# Patient Record
Sex: FEMALE | Race: BLACK OR AFRICAN AMERICAN | NOT HISPANIC OR LATINO | Employment: FULL TIME | URBAN - METROPOLITAN AREA
[De-identification: names, ages, dates, MRNs, and addresses within clinical notes are randomized per-mention and may not be internally consistent; named-entity substitution may affect disease eponyms.]

---

## 2018-01-16 ENCOUNTER — CLINICAL SUPPORT (OUTPATIENT)
Dept: URGENT CARE | Facility: CLINIC | Age: 28
End: 2018-01-16

## 2018-01-16 DIAGNOSIS — Z23 ENCOUNTER FOR IMMUNIZATION: Primary | ICD-10-CM

## 2018-01-16 PROCEDURE — 86580 TB INTRADERMAL TEST: CPT | Mod: S$GLB,,,

## 2018-05-01 ENCOUNTER — TELEPHONE (OUTPATIENT)
Dept: ORTHOPEDICS | Facility: CLINIC | Age: 28
End: 2018-05-01

## 2018-05-01 NOTE — TELEPHONE ENCOUNTER
Voice mail left for the patient stating once surgery is approved, we will get her scheduled for another appointment.     ----- Message from Laura Luu sent at 5/1/2018  9:10 AM CDT -----  Contact: Patient  [phone], please call patient, patient wants to see if she needs to make an appointment to see doctor again, they are waiting on a clearance for surgery.

## 2018-05-02 ENCOUNTER — TELEPHONE (OUTPATIENT)
Dept: ORTHOPEDICS | Facility: CLINIC | Age: 28
End: 2018-05-02

## 2018-05-02 NOTE — TELEPHONE ENCOUNTER
Voice mail left for the patient.     ----- Message from Rula Lees sent at 5/2/2018 11:32 AM CDT -----  Contact: Patient  Patient wants to know if she can get RX to help her sleep/pain

## 2018-06-04 ENCOUNTER — OFFICE VISIT (OUTPATIENT)
Dept: ORTHOPEDICS | Facility: CLINIC | Age: 28
End: 2018-06-04
Payer: COMMERCIAL

## 2018-06-04 VITALS
DIASTOLIC BLOOD PRESSURE: 60 MMHG | SYSTOLIC BLOOD PRESSURE: 96 MMHG | HEIGHT: 65 IN | BODY MASS INDEX: 23.49 KG/M2 | WEIGHT: 141 LBS | HEART RATE: 83 BPM

## 2018-06-04 DIAGNOSIS — M50.121 CERVICAL DISC DISORDER AT C4-C5 LEVEL WITH RADICULOPATHY: Primary | ICD-10-CM

## 2018-06-04 PROCEDURE — 99213 OFFICE O/P EST LOW 20 MIN: CPT | Mod: ,,, | Performed by: ORTHOPAEDIC SURGERY

## 2018-06-04 RX ORDER — HYDROCODONE BITARTRATE AND ACETAMINOPHEN 7.5; 325 MG/1; MG/1
1 TABLET ORAL EVERY 6 HOURS PRN
Qty: 30 TABLET | Refills: 0 | Status: ON HOLD | OUTPATIENT
Start: 2018-06-04 | End: 2018-06-29 | Stop reason: HOSPADM

## 2018-06-04 NOTE — PROGRESS NOTES
Subjective:       Patient ID: Osman Rm is a 27 y.o. female.    Chief Complaint: Pain of the Neck (ATTY Neck pain is worse. Pain radiates down left shoulder to elbow. She is here to schedule surgery)      History of Present Illness  She returns for follow-up and has continued complaints of constant neck pain that radiates down the left arm with pain and numbness in the left upper extremity. She was sent for second medical opinion the results of that of evaluation unknown. The patient wants to proceed with surgery and she cannot live with her neck pain.    Current Medications  Current Outpatient Prescriptions   Medication Sig Dispense Refill    ALBUTEROL SULFATE (PROAIR HFA INHL) Inhale into the lungs.      HYDROcodone-acetaminophen (NORCO) 7.5-325 mg per tablet Take 1 tablet by mouth every 6 (six) hours as needed for Pain. 30 tablet 0     No current facility-administered medications for this visit.        Allergies  Review of patient's allergies indicates:   Allergen Reactions    Adhesive tape-silicones Hives       Past Medical History  Past Medical History:   Diagnosis Date    Asthma        Surgical History  Past Surgical History:   Procedure Laterality Date    SPINAL FUSION      TONSILLECTOMY         Family History:   History reviewed. No pertinent family history.    Social History:   Social History     Social History    Marital status: Single     Spouse name: N/A    Number of children: N/A    Years of education: N/A     Occupational History    Not on file.     Social History Main Topics    Smoking status: Never Smoker    Smokeless tobacco: Not on file    Alcohol use No    Drug use: No    Sexual activity: Yes     Partners: Male     Birth control/ protection: Abstinence     Other Topics Concern    Not on file     Social History Narrative    No narrative on file       Hospitalization/Major Diagnostic Procedure:     Review of Systems     General/Constitutional:  Chills denies. Fatigue denies.  Fever denies. Weight gain denies. Weight loss denies.    Respiratory:  Shortness of breath denies.    Cardiovascular:  Chest pain denies.    Gastrointestinal:  Constipation denies. Diarrhea denies. Nausea denies. Vomiting denies.     Hematology:  Easy bruising denies. Prolonged bleeding denies.     Genitourinary:  Frequent urination denies. Pain in lower back denies. Painful urination denies.     Musculoskeletal:  See HPI for details    Skin:  Rash denies.    Neurologic:  Dizziness denies. Gait abnormalities denies. Seizures denies. Tingling/Numbess denies.    Psychiatric:  Anxiety denies. Depressed mood denies.     Objective:   Vital Signs:   Vitals:    06/04/18 1117   BP: 96/60   Pulse: 83        Physical Exam      General Examination:     Constitutional: The patient is alert and oriented to lace person and time. Mood is pleasant.     Head/Face: Normal facial features normal eyebrows    Eyes: Normal extraocular motion bilaterally    Lungs: Respirations are equal and unlabored    Gait is coordinated.    Cardiovascular: There are no swelling or varicosities present.    Lymphatic: Negative for adenopathy    Skin: Normal    Neurological: Level of consciousness normal. Oriented to place person and time and situation    Psychiatric: Oriented to time place person and situation    Cervical spine examination shows tenderness and bilateral paraspinous muscles from the mid cervical area to the cervical thoracic junction left side greater than right. Has moderate tenderness and mild spasm on the left trapezius muscle. Cervical range of motion is moderately restricted in all directions. Spurling maneuver is positive on the left side causing pain radiating to left upper extremity.  Results of prior cervical MRI were discussed. The MRI shows evidence of diminished signal intensity at C4-5 C5-6 and C6-7. The C4-5 level has evidence of a disc bulge of the annulus and foraminal stenosis on the left side consistent with her  complaints there are there is no foraminal stenosis at C5-6 or C6-7  XRAY Report/ Interpretation:      Assessment:       1. Cervical disc disorder at C4-C5 level with radiculopathy        Plan:       Osman was seen today for pain.    Diagnoses and all orders for this visit:    Cervical disc disorder at C4-C5 level with radiculopathy    Other orders  -     HYDROcodone-acetaminophen (NORCO) 7.5-325 mg per tablet; Take 1 tablet by mouth every 6 (six) hours as needed for Pain.         Follow-up cervical surgery C4-5 disc replacement.    Treatment options were discussed. She feels the pains are intractable. I believe the pain generating level is the C4-5 level. Because of her age I would recommend a cervical discectomy decompression and cervical disc arthroplasty at C4-5.  The technical aspects of the surgery were discussed in detail with the patient today. The patient was able to verbalize an understanding. The procedure risk benefits alternatives and possible complications of the surgical procedure was discussed including expected outcomes. No guarantees were given regards to outcomes. Consent forms were will be signed at a later date. All questions regarding the surgery itself were answered. The patient wishes to proceed with surgery and will be scheduled. The patient may require preoperative medical clearance.    This note was created using Dragon voice recognition software that occasionally misinterpreted phrases or words.

## 2018-06-12 ENCOUNTER — CLINICAL SUPPORT (OUTPATIENT)
Dept: URGENT CARE | Facility: CLINIC | Age: 28
End: 2018-06-12

## 2018-06-12 ENCOUNTER — TELEPHONE (OUTPATIENT)
Dept: ORTHOPEDICS | Facility: CLINIC | Age: 28
End: 2018-06-12

## 2018-06-12 DIAGNOSIS — M50.121 CERVICAL DISC DISORDER AT C4-C5 LEVEL WITH RADICULOPATHY: Primary | ICD-10-CM

## 2018-06-12 DIAGNOSIS — Z23 ENCOUNTER FOR IMMUNIZATION: Primary | ICD-10-CM

## 2018-06-12 NOTE — TELEPHONE ENCOUNTER
Sx scheduled.     ----- Message from Laura Luu sent at 6/11/2018  9:51 AM CDT -----  Contact: patient  Call patient, did we get an approval for surgery and can we start her on pre op this week since she will be out of town next week? [phone].

## 2018-06-14 ENCOUNTER — HOSPITAL ENCOUNTER (OUTPATIENT)
Dept: RADIOLOGY | Facility: HOSPITAL | Age: 28
Discharge: HOME | End: 2018-06-14
Attending: ORTHOPAEDIC SURGERY
Payer: COMMERCIAL

## 2018-06-14 ENCOUNTER — CLINICAL SUPPORT (OUTPATIENT)
Dept: URGENT CARE | Facility: CLINIC | Age: 28
End: 2018-06-14

## 2018-06-14 ENCOUNTER — HOSPITAL ENCOUNTER (OUTPATIENT)
Dept: PREADMISSION TESTING | Facility: HOSPITAL | Age: 28
Discharge: HOME | End: 2018-06-14
Attending: ORTHOPAEDIC SURGERY
Payer: COMMERCIAL

## 2018-06-14 VITALS — BODY MASS INDEX: 24.41 KG/M2 | HEIGHT: 64 IN | WEIGHT: 143 LBS

## 2018-06-14 DIAGNOSIS — M50.121 CERVICAL DISC DISORDER AT C4-C5 LEVEL WITH RADICULOPATHY: ICD-10-CM

## 2018-06-14 DIAGNOSIS — M50.121 CERVICAL DISC DISORDER AT C4-C5 LEVEL WITH RADICULOPATHY: Primary | ICD-10-CM

## 2018-06-14 DIAGNOSIS — Z11.1 ENCOUNTER FOR PPD TEST: Primary | ICD-10-CM

## 2018-06-14 LAB
ABO + RH BLD: NORMAL
BILIRUB UR QL STRIP: NEGATIVE
BLD GP AB SCN CELLS X3 SERPL QL: NORMAL
CLARITY UR: CLEAR
COLOR UR: YELLOW
GLUCOSE UR QL STRIP: NEGATIVE
HGB UR QL STRIP: ABNORMAL
KETONES UR QL STRIP: NEGATIVE
LEUKOCYTE ESTERASE UR QL STRIP: NEGATIVE
NITRITE UR QL STRIP: NEGATIVE
PH UR STRIP: 5 [PH] (ref 5–8)
PROT UR QL STRIP: NEGATIVE
SP GR UR STRIP: >=1.03 (ref 1–1.03)
TB INDURATION - 48 HR READ: NORMAL MM
TB INDURATION - 72 HR READ: NORMAL MM
TB SKIN TEST - 48 HR READ: NEGATIVE
TB SKIN TEST - 72 HR READ: NORMAL
URN SPEC COLLECT METH UR: ABNORMAL
UROBILINOGEN UR STRIP-ACNC: NEGATIVE EU/DL

## 2018-06-14 PROCEDURE — 93005 ELECTROCARDIOGRAM TRACING: CPT

## 2018-06-14 PROCEDURE — 72040 X-RAY EXAM NECK SPINE 2-3 VW: CPT | Mod: TC,FY

## 2018-06-14 PROCEDURE — 99900104 DSU ONLY-NO CHARGE-EA ADD'L HR (STAT)

## 2018-06-14 PROCEDURE — 71046 X-RAY EXAM CHEST 2 VIEWS: CPT | Mod: 26,,, | Performed by: RADIOLOGY

## 2018-06-14 PROCEDURE — 72040 X-RAY EXAM NECK SPINE 2-3 VW: CPT | Mod: 26,,, | Performed by: RADIOLOGY

## 2018-06-14 PROCEDURE — 81003 URINALYSIS AUTO W/O SCOPE: CPT

## 2018-06-14 PROCEDURE — 93010 ELECTROCARDIOGRAM REPORT: CPT | Mod: ,,, | Performed by: INTERNAL MEDICINE

## 2018-06-14 PROCEDURE — 86850 RBC ANTIBODY SCREEN: CPT

## 2018-06-14 PROCEDURE — 99900103 DSU ONLY-NO CHARGE-INITIAL HR (STAT)

## 2018-06-14 PROCEDURE — 71046 X-RAY EXAM CHEST 2 VIEWS: CPT | Mod: TC,FY

## 2018-06-14 RX ORDER — CLINDAMYCIN HYDROCHLORIDE 300 MG/1
300 CAPSULE ORAL 3 TIMES DAILY
Status: ON HOLD | COMMUNITY
End: 2018-06-29 | Stop reason: HOSPADM

## 2018-06-15 PROCEDURE — 86580 TB INTRADERMAL TEST: CPT | Mod: S$GLB,,, | Performed by: PREVENTIVE MEDICINE

## 2018-06-28 ENCOUNTER — ANESTHESIA EVENT (OUTPATIENT)
Dept: SURGERY | Facility: HOSPITAL | Age: 28
DRG: 473 | End: 2018-06-28

## 2018-06-28 ENCOUNTER — ANESTHESIA (OUTPATIENT)
Dept: SURGERY | Facility: HOSPITAL | Age: 28
DRG: 473 | End: 2018-06-28
Payer: COMMERCIAL

## 2018-06-28 ENCOUNTER — HOSPITAL ENCOUNTER (INPATIENT)
Facility: HOSPITAL | Age: 28
LOS: 1 days | Discharge: HOME | DRG: 473 | End: 2018-06-29
Attending: ORTHOPAEDIC SURGERY | Admitting: INTERNAL MEDICINE
Payer: COMMERCIAL

## 2018-06-28 DIAGNOSIS — M50.121 CERVICAL DISC DISORDER AT C4-C5 LEVEL WITH RADICULOPATHY: Primary | ICD-10-CM

## 2018-06-28 DIAGNOSIS — M50.121 CERVICAL DISC DISORDER AT C4-C5 LEVEL WITH RADICULOPATHY: ICD-10-CM

## 2018-06-28 LAB
ANION GAP SERPL CALC-SCNC: 11 MMOL/L
B-HCG UR QL: NEGATIVE
BASOPHILS # BLD AUTO: 0 K/UL
BASOPHILS NFR BLD: 0.3 %
BUN SERPL-MCNC: 12 MG/DL
CALCIUM SERPL-MCNC: 9.3 MG/DL
CHLORIDE SERPL-SCNC: 105 MMOL/L
CO2 SERPL-SCNC: 21 MMOL/L
CREAT SERPL-MCNC: 0.8 MG/DL
CTP QC/QA: YES
DIFFERENTIAL METHOD: ABNORMAL
EOSINOPHIL # BLD AUTO: 0.2 K/UL
EOSINOPHIL NFR BLD: 3.8 %
ERYTHROCYTE [DISTWIDTH] IN BLOOD BY AUTOMATED COUNT: 13.4 %
EST. GFR  (AFRICAN AMERICAN): >60 ML/MIN/1.73 M^2
EST. GFR  (NON AFRICAN AMERICAN): >60 ML/MIN/1.73 M^2
GLUCOSE SERPL-MCNC: 107 MG/DL
HCT VFR BLD AUTO: 30.9 %
HGB BLD-MCNC: 10.1 G/DL
LYMPHOCYTES # BLD AUTO: 2.1 K/UL
LYMPHOCYTES NFR BLD: 40.3 %
MCH RBC QN AUTO: 29.9 PG
MCHC RBC AUTO-ENTMCNC: 32.6 G/DL
MCV RBC AUTO: 92 FL
MONOCYTES # BLD AUTO: 0.3 K/UL
MONOCYTES NFR BLD: 6.3 %
NEUTROPHILS # BLD AUTO: 2.6 K/UL
NEUTROPHILS NFR BLD: 49.3 %
PLATELET # BLD AUTO: 213 K/UL
PMV BLD AUTO: 8.8 FL
POTASSIUM SERPL-SCNC: 3.6 MMOL/L
RBC # BLD AUTO: 3.37 M/UL
SODIUM SERPL-SCNC: 137 MMOL/L
WBC # BLD AUTO: 5.2 K/UL

## 2018-06-28 PROCEDURE — 0RB30ZZ EXCISION OF CERVICAL VERTEBRAL DISC, OPEN APPROACH: ICD-10-PCS | Performed by: ORTHOPAEDIC SURGERY

## 2018-06-28 PROCEDURE — 27201423 OPTIME MED/SURG SUP & DEVICES STERILE SUPPLY: Performed by: ORTHOPAEDIC SURGERY

## 2018-06-28 PROCEDURE — 85025 COMPLETE CBC W/AUTO DIFF WBC: CPT

## 2018-06-28 PROCEDURE — 37000008 HC ANESTHESIA 1ST 15 MINUTES: Performed by: ORTHOPAEDIC SURGERY

## 2018-06-28 PROCEDURE — 27000221 HC OXYGEN, UP TO 24 HOURS

## 2018-06-28 PROCEDURE — 0RG10J0 FUSION OF CERVICAL VERTEBRAL JOINT WITH SYNTHETIC SUBSTITUTE, ANTERIOR APPROACH, ANTERIOR COLUMN, OPEN APPROACH: ICD-10-PCS | Performed by: ORTHOPAEDIC SURGERY

## 2018-06-28 PROCEDURE — 25000003 PHARM REV CODE 250: Performed by: NURSE ANESTHETIST, CERTIFIED REGISTERED

## 2018-06-28 PROCEDURE — 36000710: Performed by: ORTHOPAEDIC SURGERY

## 2018-06-28 PROCEDURE — 36415 COLL VENOUS BLD VENIPUNCTURE: CPT

## 2018-06-28 PROCEDURE — 27800903 OPTIME MED/SURG SUP & DEVICES OTHER IMPLANTS: Performed by: ORTHOPAEDIC SURGERY

## 2018-06-28 PROCEDURE — 94799 UNLISTED PULMONARY SVC/PX: CPT

## 2018-06-28 PROCEDURE — 36000711: Performed by: ORTHOPAEDIC SURGERY

## 2018-06-28 PROCEDURE — 88304 TISSUE EXAM BY PATHOLOGIST: CPT | Mod: 26,,, | Performed by: PATHOLOGY

## 2018-06-28 PROCEDURE — 71000033 HC RECOVERY, INTIAL HOUR: Performed by: ORTHOPAEDIC SURGERY

## 2018-06-28 PROCEDURE — 63600175 PHARM REV CODE 636 W HCPCS: Performed by: ORTHOPAEDIC SURGERY

## 2018-06-28 PROCEDURE — 25000003 PHARM REV CODE 250: Performed by: PHYSICIAN ASSISTANT

## 2018-06-28 PROCEDURE — 12000002 HC ACUTE/MED SURGE SEMI-PRIVATE ROOM

## 2018-06-28 PROCEDURE — 63600175 PHARM REV CODE 636 W HCPCS: Performed by: PHYSICIAN ASSISTANT

## 2018-06-28 PROCEDURE — 99900035 HC TECH TIME PER 15 MIN (STAT)

## 2018-06-28 PROCEDURE — 63600175 PHARM REV CODE 636 W HCPCS: Performed by: ANESTHESIOLOGY

## 2018-06-28 PROCEDURE — D9220A PRA ANESTHESIA: Mod: CRNA,,, | Performed by: NURSE ANESTHETIST, CERTIFIED REGISTERED

## 2018-06-28 PROCEDURE — 99900104 DSU ONLY-NO CHARGE-EA ADD'L HR (STAT): Performed by: ORTHOPAEDIC SURGERY

## 2018-06-28 PROCEDURE — 25000003 PHARM REV CODE 250: Performed by: ORTHOPAEDIC SURGERY

## 2018-06-28 PROCEDURE — 63600175 PHARM REV CODE 636 W HCPCS: Performed by: NURSE ANESTHETIST, CERTIFIED REGISTERED

## 2018-06-28 PROCEDURE — 80048 BASIC METABOLIC PNL TOTAL CA: CPT

## 2018-06-28 PROCEDURE — D9220A PRA ANESTHESIA: Mod: ANES,,, | Performed by: ANESTHESIOLOGY

## 2018-06-28 PROCEDURE — 25000003 PHARM REV CODE 250: Performed by: ANESTHESIOLOGY

## 2018-06-28 PROCEDURE — 88304 TISSUE EXAM BY PATHOLOGIST: CPT | Performed by: PATHOLOGY

## 2018-06-28 PROCEDURE — 94761 N-INVAS EAR/PLS OXIMETRY MLT: CPT

## 2018-06-28 PROCEDURE — 37000009 HC ANESTHESIA EA ADD 15 MINS: Performed by: ORTHOPAEDIC SURGERY

## 2018-06-28 PROCEDURE — 25000003 PHARM REV CODE 250: Performed by: INTERNAL MEDICINE

## 2018-06-28 PROCEDURE — 94770 HC EXHALED C02 TEST: CPT

## 2018-06-28 PROCEDURE — 71000039 HC RECOVERY, EACH ADD'L HOUR: Performed by: ORTHOPAEDIC SURGERY

## 2018-06-28 PROCEDURE — C1713 ANCHOR/SCREW BN/BN,TIS/BN: HCPCS | Performed by: ORTHOPAEDIC SURGERY

## 2018-06-28 PROCEDURE — 99222 1ST HOSP IP/OBS MODERATE 55: CPT | Mod: ,,, | Performed by: INTERNAL MEDICINE

## 2018-06-28 PROCEDURE — S0020 INJECTION, BUPIVICAINE HYDRO: HCPCS | Performed by: ORTHOPAEDIC SURGERY

## 2018-06-28 PROCEDURE — 99900103 DSU ONLY-NO CHARGE-INITIAL HR (STAT): Performed by: ORTHOPAEDIC SURGERY

## 2018-06-28 DEVICE — DISC CERVICAL MOBI-C 13X15X5MM: Type: IMPLANTABLE DEVICE | Site: NECK | Status: FUNCTIONAL

## 2018-06-28 RX ORDER — ONDANSETRON 4 MG/1
8 TABLET, ORALLY DISINTEGRATING ORAL EVERY 6 HOURS PRN
Status: DISCONTINUED | OUTPATIENT
Start: 2018-06-28 | End: 2018-06-29 | Stop reason: HOSPADM

## 2018-06-28 RX ORDER — NEOSTIGMINE METHYLSULFATE 1 MG/ML
INJECTION, SOLUTION INTRAVENOUS
Status: DISCONTINUED | OUTPATIENT
Start: 2018-06-28 | End: 2018-06-28

## 2018-06-28 RX ORDER — HYDROMORPHONE HCL IN 0.9% NACL 6 MG/30 ML
PATIENT CONTROLLED ANALGESIA SYRINGE INTRAVENOUS CONTINUOUS
Status: DISCONTINUED | OUTPATIENT
Start: 2018-06-28 | End: 2018-06-29

## 2018-06-28 RX ORDER — ONDANSETRON 4 MG/1
8 TABLET, ORALLY DISINTEGRATING ORAL EVERY 8 HOURS PRN
Status: DISCONTINUED | OUTPATIENT
Start: 2018-06-28 | End: 2018-06-28 | Stop reason: SDUPTHER

## 2018-06-28 RX ORDER — KETOROLAC TROMETHAMINE 30 MG/ML
INJECTION, SOLUTION INTRAMUSCULAR; INTRAVENOUS
Status: DISCONTINUED | OUTPATIENT
Start: 2018-06-28 | End: 2018-06-28

## 2018-06-28 RX ORDER — RAMELTEON 8 MG/1
8 TABLET ORAL NIGHTLY PRN
Status: DISCONTINUED | OUTPATIENT
Start: 2018-06-28 | End: 2018-06-29 | Stop reason: HOSPADM

## 2018-06-28 RX ORDER — LIDOCAINE HCL/PF 100 MG/5ML
SYRINGE (ML) INTRAVENOUS
Status: DISCONTINUED | OUTPATIENT
Start: 2018-06-28 | End: 2018-06-28

## 2018-06-28 RX ORDER — ACETAMINOPHEN 10 MG/ML
INJECTION, SOLUTION INTRAVENOUS
Status: DISCONTINUED | OUTPATIENT
Start: 2018-06-28 | End: 2018-06-28

## 2018-06-28 RX ORDER — SODIUM CHLORIDE, SODIUM LACTATE, POTASSIUM CHLORIDE, CALCIUM CHLORIDE 600; 310; 30; 20 MG/100ML; MG/100ML; MG/100ML; MG/100ML
INJECTION, SOLUTION INTRAVENOUS CONTINUOUS
Status: DISCONTINUED | OUTPATIENT
Start: 2018-06-28 | End: 2018-06-29 | Stop reason: HOSPADM

## 2018-06-28 RX ORDER — SODIUM CHLORIDE 0.9 % (FLUSH) 0.9 %
3 SYRINGE (ML) INJECTION
Status: DISCONTINUED | OUTPATIENT
Start: 2018-06-28 | End: 2018-06-29 | Stop reason: HOSPADM

## 2018-06-28 RX ORDER — GLYCOPYRROLATE 0.2 MG/ML
INJECTION INTRAMUSCULAR; INTRAVENOUS
Status: DISCONTINUED | OUTPATIENT
Start: 2018-06-28 | End: 2018-06-28

## 2018-06-28 RX ORDER — MAG HYDROX/ALUMINUM HYD/SIMETH 200-200-20
30 SUSPENSION, ORAL (FINAL DOSE FORM) ORAL EVERY 4 HOURS PRN
Status: DISCONTINUED | OUTPATIENT
Start: 2018-06-28 | End: 2018-06-29 | Stop reason: HOSPADM

## 2018-06-28 RX ORDER — MEPERIDINE HYDROCHLORIDE 25 MG/ML
12.5 INJECTION INTRAMUSCULAR; INTRAVENOUS; SUBCUTANEOUS ONCE AS NEEDED
Status: COMPLETED | OUTPATIENT
Start: 2018-06-28 | End: 2018-06-28

## 2018-06-28 RX ORDER — FENTANYL CITRATE 50 UG/ML
INJECTION, SOLUTION INTRAMUSCULAR; INTRAVENOUS
Status: DISCONTINUED | OUTPATIENT
Start: 2018-06-28 | End: 2018-06-28

## 2018-06-28 RX ORDER — HYDROMORPHONE HYDROCHLORIDE 2 MG/ML
2 INJECTION, SOLUTION INTRAMUSCULAR; INTRAVENOUS; SUBCUTANEOUS
Status: DISCONTINUED | OUTPATIENT
Start: 2018-06-28 | End: 2018-06-29 | Stop reason: HOSPADM

## 2018-06-28 RX ORDER — OXYCODONE HYDROCHLORIDE 5 MG/1
5 TABLET ORAL EVERY 4 HOURS PRN
Status: DISCONTINUED | OUTPATIENT
Start: 2018-06-28 | End: 2018-06-29 | Stop reason: HOSPADM

## 2018-06-28 RX ORDER — CEFAZOLIN SODIUM 2 G/50ML
2 SOLUTION INTRAVENOUS
Status: COMPLETED | OUTPATIENT
Start: 2018-06-28 | End: 2018-06-29

## 2018-06-28 RX ORDER — ALBUTEROL SULFATE 2.5 MG/.5ML
2.5 SOLUTION RESPIRATORY (INHALATION) EVERY 4 HOURS PRN
Status: DISCONTINUED | OUTPATIENT
Start: 2018-06-28 | End: 2018-06-29 | Stop reason: HOSPADM

## 2018-06-28 RX ORDER — NALOXONE HCL 0.4 MG/ML
0.02 VIAL (ML) INJECTION
Status: DISCONTINUED | OUTPATIENT
Start: 2018-06-28 | End: 2018-06-29 | Stop reason: HOSPADM

## 2018-06-28 RX ORDER — MIDAZOLAM HYDROCHLORIDE 1 MG/ML
INJECTION, SOLUTION INTRAMUSCULAR; INTRAVENOUS
Status: DISCONTINUED | OUTPATIENT
Start: 2018-06-28 | End: 2018-06-28

## 2018-06-28 RX ORDER — MUPIROCIN 20 MG/G
1 OINTMENT TOPICAL 2 TIMES DAILY
Status: DISCONTINUED | OUTPATIENT
Start: 2018-06-28 | End: 2018-06-29 | Stop reason: HOSPADM

## 2018-06-28 RX ORDER — HYDROMORPHONE HYDROCHLORIDE 1 MG/ML
1 INJECTION, SOLUTION INTRAMUSCULAR; INTRAVENOUS; SUBCUTANEOUS
Status: DISCONTINUED | OUTPATIENT
Start: 2018-06-28 | End: 2018-06-29 | Stop reason: HOSPADM

## 2018-06-28 RX ORDER — CEFAZOLIN SODIUM 2 G/50ML
2 SOLUTION INTRAVENOUS
Status: DISCONTINUED | OUTPATIENT
Start: 2018-06-28 | End: 2018-06-28 | Stop reason: HOSPADM

## 2018-06-28 RX ORDER — BUPIVACAINE HYDROCHLORIDE 5 MG/ML
INJECTION, SOLUTION EPIDURAL; INTRACAUDAL
Status: DISCONTINUED | OUTPATIENT
Start: 2018-06-28 | End: 2018-06-28 | Stop reason: HOSPADM

## 2018-06-28 RX ORDER — DIPHENHYDRAMINE HCL 25 MG
25 CAPSULE ORAL EVERY 6 HOURS PRN
Status: DISCONTINUED | OUTPATIENT
Start: 2018-06-28 | End: 2018-06-29 | Stop reason: HOSPADM

## 2018-06-28 RX ORDER — METOCLOPRAMIDE HYDROCHLORIDE 5 MG/ML
10 INJECTION INTRAMUSCULAR; INTRAVENOUS EVERY 10 MIN PRN
Status: DISCONTINUED | OUTPATIENT
Start: 2018-06-28 | End: 2018-06-28 | Stop reason: HOSPADM

## 2018-06-28 RX ORDER — BACITRACIN 50000 [IU]/1
INJECTION, POWDER, FOR SOLUTION INTRAMUSCULAR
Status: DISCONTINUED | OUTPATIENT
Start: 2018-06-28 | End: 2018-06-28 | Stop reason: HOSPADM

## 2018-06-28 RX ORDER — KETAMINE HYDROCHLORIDE 100 MG/ML
INJECTION, SOLUTION INTRAMUSCULAR; INTRAVENOUS
Status: DISCONTINUED | OUTPATIENT
Start: 2018-06-28 | End: 2018-06-28

## 2018-06-28 RX ORDER — AMOXICILLIN 250 MG
2 CAPSULE ORAL NIGHTLY PRN
Status: DISCONTINUED | OUTPATIENT
Start: 2018-06-28 | End: 2018-06-29 | Stop reason: HOSPADM

## 2018-06-28 RX ORDER — ACETAMINOPHEN 325 MG/1
650 TABLET ORAL EVERY 6 HOURS PRN
Status: DISCONTINUED | OUTPATIENT
Start: 2018-06-29 | End: 2018-06-29 | Stop reason: HOSPADM

## 2018-06-28 RX ORDER — PROPOFOL 10 MG/ML
VIAL (ML) INTRAVENOUS CONTINUOUS PRN
Status: DISCONTINUED | OUTPATIENT
Start: 2018-06-28 | End: 2018-06-28

## 2018-06-28 RX ORDER — DOCUSATE SODIUM 100 MG/1
100 CAPSULE, LIQUID FILLED ORAL DAILY
Status: DISCONTINUED | OUTPATIENT
Start: 2018-06-28 | End: 2018-06-29 | Stop reason: HOSPADM

## 2018-06-28 RX ORDER — ROCURONIUM BROMIDE 10 MG/ML
INJECTION, SOLUTION INTRAVENOUS
Status: DISCONTINUED | OUTPATIENT
Start: 2018-06-28 | End: 2018-06-28

## 2018-06-28 RX ORDER — BISACODYL 10 MG
10 SUPPOSITORY, RECTAL RECTAL DAILY
Status: DISCONTINUED | OUTPATIENT
Start: 2018-06-28 | End: 2018-06-29 | Stop reason: HOSPADM

## 2018-06-28 RX ORDER — ACETAMINOPHEN 10 MG/ML
1000 INJECTION, SOLUTION INTRAVENOUS EVERY 8 HOURS
Status: COMPLETED | OUTPATIENT
Start: 2018-06-28 | End: 2018-06-29

## 2018-06-28 RX ORDER — LIDOCAINE HYDROCHLORIDE 10 MG/ML
INJECTION, SOLUTION EPIDURAL; INFILTRATION; INTRACAUDAL; PERINEURAL
Status: DISPENSED
Start: 2018-06-28 | End: 2018-06-28

## 2018-06-28 RX ORDER — KETOROLAC TROMETHAMINE 30 MG/ML
15 INJECTION, SOLUTION INTRAMUSCULAR; INTRAVENOUS ONCE
Status: DISCONTINUED | OUTPATIENT
Start: 2018-06-28 | End: 2018-06-29 | Stop reason: HOSPADM

## 2018-06-28 RX ORDER — PROPOFOL 10 MG/ML
VIAL (ML) INTRAVENOUS
Status: DISCONTINUED | OUTPATIENT
Start: 2018-06-28 | End: 2018-06-28

## 2018-06-28 RX ORDER — HYDROMORPHONE HYDROCHLORIDE 1 MG/ML
0.2 INJECTION, SOLUTION INTRAMUSCULAR; INTRAVENOUS; SUBCUTANEOUS EVERY 5 MIN PRN
Status: DISCONTINUED | OUTPATIENT
Start: 2018-06-28 | End: 2018-06-28 | Stop reason: HOSPADM

## 2018-06-28 RX ORDER — DEXAMETHASONE SODIUM PHOSPHATE 4 MG/ML
INJECTION, SOLUTION INTRA-ARTICULAR; INTRALESIONAL; INTRAMUSCULAR; INTRAVENOUS; SOFT TISSUE
Status: DISCONTINUED | OUTPATIENT
Start: 2018-06-28 | End: 2018-06-28

## 2018-06-28 RX ORDER — OXYCODONE HYDROCHLORIDE 10 MG/1
10 TABLET ORAL EVERY 4 HOURS PRN
Status: DISCONTINUED | OUTPATIENT
Start: 2018-06-28 | End: 2018-06-29 | Stop reason: HOSPADM

## 2018-06-28 RX ORDER — ALBUTEROL SULFATE 90 UG/1
2 AEROSOL, METERED RESPIRATORY (INHALATION) EVERY 4 HOURS PRN
Status: DISCONTINUED | OUTPATIENT
Start: 2018-06-28 | End: 2018-06-28 | Stop reason: SDUPTHER

## 2018-06-28 RX ORDER — PANTOPRAZOLE SODIUM 40 MG/1
40 TABLET, DELAYED RELEASE ORAL DAILY
Status: DISCONTINUED | OUTPATIENT
Start: 2018-06-28 | End: 2018-06-29 | Stop reason: HOSPADM

## 2018-06-28 RX ADMIN — MUPIROCIN 1 G: 20 OINTMENT TOPICAL at 01:06

## 2018-06-28 RX ADMIN — FENTANYL CITRATE 50 MCG: 50 INJECTION, SOLUTION INTRAMUSCULAR; INTRAVENOUS at 09:06

## 2018-06-28 RX ADMIN — CEFAZOLIN SODIUM 2 G: 2 SOLUTION INTRAVENOUS at 08:06

## 2018-06-28 RX ADMIN — HYDROMORPHONE HYDROCHLORIDE 0.2 MG: 1 INJECTION, SOLUTION INTRAMUSCULAR; INTRAVENOUS; SUBCUTANEOUS at 10:06

## 2018-06-28 RX ADMIN — DEXAMETHASONE SODIUM PHOSPHATE 4 MG: 4 INJECTION, SOLUTION INTRAMUSCULAR; INTRAVENOUS at 09:06

## 2018-06-28 RX ADMIN — SODIUM CHLORIDE, SODIUM LACTATE, POTASSIUM CHLORIDE, AND CALCIUM CHLORIDE: .6; .31; .03; .02 INJECTION, SOLUTION INTRAVENOUS at 09:06

## 2018-06-28 RX ADMIN — ACETAMINOPHEN 1000 MG: 10 INJECTION, SOLUTION INTRAVENOUS at 04:06

## 2018-06-28 RX ADMIN — KETOROLAC TROMETHAMINE 30 MG: 30 INJECTION, SOLUTION INTRAMUSCULAR; INTRAVENOUS at 09:06

## 2018-06-28 RX ADMIN — NEOSTIGMINE METHYLSULFATE 3 MG: 1 INJECTION INTRAVENOUS at 09:06

## 2018-06-28 RX ADMIN — Medication: at 11:06

## 2018-06-28 RX ADMIN — GLYCOPYRROLATE 0.4 MG: 0.2 INJECTION, SOLUTION INTRAMUSCULAR; INTRAVENOUS at 09:06

## 2018-06-28 RX ADMIN — MIDAZOLAM 2 MG: 1 INJECTION INTRAMUSCULAR; INTRAVENOUS at 08:06

## 2018-06-28 RX ADMIN — ROCURONIUM BROMIDE 50 MG: 10 INJECTION, SOLUTION INTRAVENOUS at 08:06

## 2018-06-28 RX ADMIN — Medication: at 07:06

## 2018-06-28 RX ADMIN — PROPOFOL 50 MCG/KG/MIN: 10 INJECTION, EMULSION INTRAVENOUS at 08:06

## 2018-06-28 RX ADMIN — DIPHENHYDRAMINE HYDROCHLORIDE 25 MG: 25 CAPSULE ORAL at 09:06

## 2018-06-28 RX ADMIN — KETAMINE HYDROCHLORIDE 50 MG: 100 INJECTION, SOLUTION, CONCENTRATE INTRAMUSCULAR; INTRAVENOUS at 08:06

## 2018-06-28 RX ADMIN — ACETAMINOPHEN 1000 MG: 10 INJECTION, SOLUTION INTRAVENOUS at 09:06

## 2018-06-28 RX ADMIN — FENTANYL CITRATE 100 MCG: 50 INJECTION, SOLUTION INTRAMUSCULAR; INTRAVENOUS at 08:06

## 2018-06-28 RX ADMIN — LIDOCAINE HYDROCHLORIDE 75 MG: 20 INJECTION, SOLUTION INTRAVENOUS at 08:06

## 2018-06-28 RX ADMIN — SODIUM CHLORIDE, SODIUM LACTATE, POTASSIUM CHLORIDE, AND CALCIUM CHLORIDE: .6; .31; .03; .02 INJECTION, SOLUTION INTRAVENOUS at 07:06

## 2018-06-28 RX ADMIN — MEPERIDINE HYDROCHLORIDE 12.5 MG: 25 INJECTION INTRAMUSCULAR; INTRAVENOUS; SUBCUTANEOUS at 11:06

## 2018-06-28 RX ADMIN — CEFAZOLIN SODIUM 2 G: 2 SOLUTION INTRAVENOUS at 11:06

## 2018-06-28 RX ADMIN — SODIUM CHLORIDE, SODIUM LACTATE, POTASSIUM CHLORIDE, AND CALCIUM CHLORIDE: .6; .31; .03; .02 INJECTION, SOLUTION INTRAVENOUS at 11:06

## 2018-06-28 RX ADMIN — DIPHENHYDRAMINE HYDROCHLORIDE 25 MG: 25 CAPSULE ORAL at 03:06

## 2018-06-28 RX ADMIN — PROPOFOL 140 MG: 10 INJECTION, EMULSION INTRAVENOUS at 08:06

## 2018-06-28 RX ADMIN — MUPIROCIN 1 G: 20 OINTMENT TOPICAL at 09:06

## 2018-06-28 RX ADMIN — CEFAZOLIN SODIUM 2 G: 2 SOLUTION INTRAVENOUS at 03:06

## 2018-06-28 NOTE — PLAN OF CARE
Patient is stable at this time.  VSS. Anesthesiologist at patient's bedside and is ok for patient to transfer to the floor.  Dressings clean, dry and intact.  Pain is a 6/10.  No complaints of nausea or vomiting.  Patient tolerating po intake well.

## 2018-06-28 NOTE — ANESTHESIA POSTPROCEDURE EVALUATION
"Anesthesia Post Evaluation    Patient: Osman Rm    Procedure(s) Performed: Procedure(s) (LRB):  DISCECTOMY, SPINE, CERVICAL, ANTERIOR APPROACH, ARTHROPLASTY C4-5 (N/A)  DECOMPRESSION (N/A)    Final Anesthesia Type: general  Patient location during evaluation: PACU  Patient participation: Yes- Able to Participate  Level of consciousness: awake and alert  Post-procedure vital signs: reviewed and stable  Pain management: adequate  Airway patency: patent  PONV status at discharge: No PONV  Anesthetic complications: no      Cardiovascular status: blood pressure returned to baseline  Respiratory status: unassisted  Hydration status: euvolemic  Follow-up not needed.        Visit Vitals  BP (!) 98/56 (Patient Position: Lying)   Pulse 64   Temp 36.2 °C (97.2 °F) (Axillary)   Resp 16   Ht 5' 4" (1.626 m)   Wt 64.9 kg (143 lb)   SpO2 100%   Breastfeeding? No   BMI 24.55 kg/m²       Pain/Brandon Score: Pain Assessment Performed: Yes (6/28/2018 12:00 PM)  Presence of Pain: complains of pain/discomfort (6/28/2018 12:00 PM)  Pain Rating Prior to Med Admin: 9 (6/28/2018 11:31 AM)  Brandon Score: 9 (6/28/2018 12:00 PM)      "

## 2018-06-28 NOTE — H&P
PCP: Annamarie Garcia NP    History & Physical    Chief Complaint: s/p C4-5 Cervical spine disectomy, arthroplasty and decompression    History of Present Illness:  Patient is a 27 y.o. female admitted to Hospitalist Service from Operation Room s/p C4-5 Cervical spine disectomy, arthroplasty and decompression performed by Dr. Brennan. Patient reportedly has past medical history significant for Neck pain and history of bronchial asthma. Post-operatively, patient denied chest pain, shortness of breath, abdominal pain, nausea, vomiting, headache, vision changes, focal neuro-deficits, cough or fever.    Past Medical History:   Diagnosis Date    Asthma      Past Surgical History:   Procedure Laterality Date    SPINAL FUSION      TONSILLECTOMY       History reviewed. No pertinent family history.  Social History   Substance Use Topics    Smoking status: Never Smoker    Smokeless tobacco: Never Used    Alcohol use No      Review of patient's allergies indicates:   Allergen Reactions    Adhesive tape-silicones Hives     PTA Medications   Medication Sig    HYDROcodone-acetaminophen (NORCO) 7.5-325 mg per tablet Take 1 tablet by mouth every 6 (six) hours as needed for Pain.    ALBUTEROL SULFATE (PROAIR HFA INHL) Inhale into the lungs.    clindamycin (CLEOCIN) 300 MG capsule Take 300 mg by mouth 3 (three) times daily.     Review of Systems:  Constitutional: no fever or chills  Eyes: no visual changes  Ears, nose, mouth, throat, and face: no nasal congestion or sore throat  Respiratory: no cough or shorness of breath  Cardiovascular: no chest pain or palpitations  Gastrointestinal: no nausea or vomiting, no abdominal pain or change in bowel habits  Genitourinary: no hematuria or dysuria  Integument/breast: no rash or pruritis  Hematologic/lymphatic: no easy bruising or lymphadenopathy  Musculoskeletal: see HPI  Neurological: no seizures or tremors.  Behavioral/Psych: no auditory or visual hallucinations  Endocrine: no  heat or cold intolerance     OBJECTIVE:     Vital Signs (Most Recent)  Temp: 97.3 °F (36.3 °C) (06/28/18 1010)  Pulse: 67 (06/28/18 0705)  Resp: 14 (06/28/18 0705)  BP: 107/64 (06/28/18 0705)  SpO2: 99 % (06/28/18 0705)    Physical Exam:  General appearance: well developed, appears stated age  Head: normocephalic, atraumatic  Eyes:  conjunctivae/corneas clear. PERRL.  Nose: Nares normal. Septum midline.  Throat: lips, mucosa, and tongue normal; teeth and gums normal, no throat erythema.  Neck: Neck dressing C/D/I, a soft neck collar in place, symmetrical, trachea midline, no JVD and thyroid not enlarged, symmetric, no tenderness/mass/nodules  Lungs:  clear to auscultation bilaterally and normal respiratory effort  Chest wall: no tenderness  Heart: regular rate and rhythm, S1, S2 normal, no murmur, click, rub or gallop  Abdomen: soft, non-tender non-distented; bowel sounds normal; no masses,  no organomegaly  Extremities: no cyanosis, clubbing or edema.   Pulses: 2+ and symmetric  Skin: Skin color, texture, turgor normal. No rashes or lesions.  Lymph nodes: Cervical, supraclavicular, and axillary nodes normal.  Neurologic: Normal strength and tone. No focal numbness or weakness. CNII-XII intact.    Laboratory:   CBC: No results for input(s): WBC, RBC, HGB, HCT, PLT, MCV, MCH, MCHC in the last 168 hours.  CMP: No results for input(s): GLU, CALCIUM, ALBUMIN, PROT, NA, K, CO2, CL, BUN, CREATININE, ALKPHOS, ALT, AST, BILITOT in the last 168 hours.    No results found for: HGBA1C    Diagnostic Results: None    Assessment/Plan:     Active Hospital Problems    Diagnosis  POA    *Cervical disc disorder at C4-C5 level with radiculopathy s/p C4-5 Cervical spine disectomy, arthroplasty and decompression [M50.121]  Continue to follow Orthopedic recommendations.  Needs aggressive incentive spirometry.  Follow hemoglobin and hematocrit closely.  Pain control with IV narcotics and antiemetics as needed.  Physical therapy as per  Orthopedics protocol with fall precautions.    Yes       DVT prophylaxis: Use SCD and TEDs. No anti-coagulation as per Dr. Brennan.      Adan Brownlee MD  Department of Hospital Medicine   [facility]

## 2018-06-28 NOTE — PLAN OF CARE
Problem: Patient Care Overview  Goal: Plan of Care Review  Outcome: Ongoing (interventions implemented as appropriate)  Pt POD 0 today. Left neck dressing and soft collar in place. Pt refuses to reposition or move in bed. Complains of left eye pain she believes may be related to adhesive during surgery. Suarez catheter in place. PCA pump used for pain. Benadryl prn for itching. Neurologically intact. Tolerating clear liquid diet. POC discussed with pt and family.

## 2018-06-28 NOTE — TRANSFER OF CARE
"Anesthesia Transfer of Care Note    Patient: Osman Rm    Procedure(s) Performed: Procedure(s) (LRB):  DISCECTOMY, SPINE, CERVICAL, ANTERIOR APPROACH, ARTHROPLASTY C4-5 (N/A)  DECOMPRESSION (N/A)    Patient location: PACU    Anesthesia Type: general    Transport from OR: Transported from OR on 2-3 L/min O2 by NC with adequate spontaneous ventilation    Post pain: adequate analgesia    Post assessment: no apparent anesthetic complications    Post vital signs: stable    Level of consciousness: awake    Nausea/Vomiting: no nausea/vomiting    Complications: none    Transfer of care protocol was followed      Last vitals:   Visit Vitals  /64 (BP Location: Left arm, Patient Position: Lying)   Pulse 67   Temp 36.5 °C (97.7 °F) (Skin)   Resp 14   Ht 5' 4" (1.626 m)   Wt 64.9 kg (143 lb)   SpO2 99%   Breastfeeding? No   BMI 24.55 kg/m²     "

## 2018-06-28 NOTE — ANESTHESIA PREPROCEDURE EVALUATION
06/28/2018  Osman Rm is a 27 y.o., female.    Anesthesia Evaluation    I have reviewed the Patient Summary Reports.    I have reviewed the Nursing Notes.   I have reviewed the Medications.     Review of Systems  Anesthesia Hx:  Denies Family Hx of Anesthesia complications.   Denies Personal Hx of Anesthesia complications.   Pulmonary:   Asthma mild    Musculoskeletal:   Radiculopathy left Spine Disorders: cervical        Physical Exam  General:  Well nourished    Airway/Jaw/Neck:  Airway Findings: Mouth Opening: Normal Tongue: Normal  General Airway Assessment: Adult  Mallampati: II  Improves to I with phonation.  TM Distance: Normal, at least 6 cm  Jaw/Neck Findings:  Neck ROM: Normal Extension, Painful  Neck Findings: Normal     Dental:  DENTAL FINDINGS: Normal   Chest/Lungs:  Chest/Lungs Clear    Heart/Vascular:  Heart Findings: Normal            Anesthesia Plan  Type of Anesthesia, risks & benefits discussed:  Anesthesia Type:  general  Patient's Preference:   Intra-op Monitoring Plan: standard ASA monitors  Intra-op Monitoring Plan Comments:   Post Op Pain Control Plan:   Post Op Pain Control Plan Comments:   Induction:   IV  Beta Blocker:  Patient is not currently on a Beta-Blocker (No further documentation required).       Informed Consent: Patient understands risks and agrees with Anesthesia plan.  Questions answered. Anesthesia consent signed with patient.  ASA Score: 1     Day of Surgery Review of History & Physical:    H&P update referred to the surgeon.         Ready For Surgery From Anesthesia Perspective.

## 2018-06-28 NOTE — BRIEF OP NOTE
[facility]  Brief Operative Note    SUMMARY     Surgery Date: 6/28/2018     Surgeon(s) and Role:     * Andrew Brennan MD - Primary    Assisting Surgeon: tomy Fuentes    Pre-op Diagnosis:  Cervical disc disorder at C4-C5 level with radiculopathy [M50.121]    Post-op Diagnosis:  Post-Op Diagnosis Codes:     * Cervical disc disorder at C4-C5 level with radiculopathy [M50.121]    Procedure(s) (LRB):  DISCECTOMY, SPINE, CERVICAL, ANTERIOR APPROACH, ARTHROPLASTY C4-5 (N/A)  DECOMPRESSION (N/A)    Anesthesia: General    Description of Procedure: dictated    Description of the findings of the procedure: dictated    Estimated Blood Loss: * No values recorded between 6/28/2018  8:44 AM and 6/28/2018  9:58 AM *         Specimens:   Specimen (12h ago through future)    Start     Ordered    06/28/18 0952  Specimen to Pathology - Surgery  Once     Comments:  Pre-op Diagnosis: Cervical disc disorder at C4-C5 level with radiculopathy [M50.121]Post-op Diagnosis: sameProcedure(s):DISCECTOMY, SPINE, CERVICAL, ANTERIOR APPROACH, ARTHROPLASTY C4-5DECOMPRESSION Number of specimens: 1Name of specimens: 1. c4-5 disc      06/28/18 0952

## 2018-06-28 NOTE — OR NURSING
Pt states she has weakness on left side of body. Hand , plantarflexion and dorsiflexion all noted to be weaker on left side.

## 2018-06-28 NOTE — OP NOTE
DATE OF PROCEDURE:  06/28/2018.    PREOPERATIVE DIAGNOSIS:  Cervical disk syndrome with radiculopathy, C4-C5.    FINAL DIAGNOSIS:  Cervical disk syndrome with radiculopathy, C4-C5.    PROCEDURES PERFORMED:  1.  Anterior cervical diskectomy and decompression C4-C5, CPT code 04029.  2.  Anterior cervical disk arthroplasty C4-C5 using Mobi-C 5 mm implant, CPT   code 39996.    SURGEON:  Andrew Brennan.    ASSISTANT:  JOSS Mcdonnell.    ANESTHESIA:  General.    OPERATIVE REPORT:  The nature of this procedure required the use of a skilled   surgical assistant, Ritesh Fuentes, physician's assistant served in that   capacity.  His role included patient positioning, surgical exposure,   implantation of disk arthroplasty and wound closure.  No qualified resident   physician was available.    PROCEDURE IN DETAILS:  The patient was brought to the Operating Room, placed on   the table in the supine position and intubated.  Catheter was placed in the   bladder.  Head halter traction was used to hold the neck in a neutral position.    Bony prominences were padded.  Leads were placed on the patient to perform   motor evoked potentials and somatosensory evoked potentials.  C4-C5 was   visualized using the image intensifier.  The anterior cervical area was cleansed   with alcohol and prepped with ChloraPrep solution.  A transverse incision was   made at C4-C5 level on the right side measuring about 2 cm.  We then dissected   through the platysma muscle and developed the interval between the trachea and   esophagus medially and carotid sheath laterally down to the prevertebral fascia   and the anterior spine were reidentified in the midline as well as C4-C5 level.    We then placed self-retaining retractors and [place] pins into the C4-C5   vertebral bodies and then incised the disk annulus, removed the disk in a   piecemeal fashion including cartilaginous endplates.  There was some bony   hypertrophy of the posterior uncinate  processes, which was removed with a bur to   make the superior endplate of C5 parallel.  A 5-mm trial gave a good fit.  The   Mobi-C 5 mm implant was inserted was brought onto the field.  We did bilateral   foraminotomies and left for the most part, the posterior longitudinal ligament   intact.  The Mobi-C 5 mm implant was then impacted into place under direct   visualization using image intensifier and once it was properly seated, the   insertion mechanism was removed.  The entire construct looked satisfactory.  We   removed the [place] pins then covered the holes with bone wax.  A drain was   brought through a separate stab incision.  The cervical incision was closed in   layers using Dermabond on the skin.  Throughout the case, there were no   abnormalities noted on neuromonitoring.  The patient was awakened, extubated and   brought to Recovery Room in stable condition.            /edmar [id number] arelis(s)        LUIS EDUARDO/ROMAN  dd: 06/28/2018 09:58:10 (CDT)  td: 06/28/2018 13:36:26 (CDT)  Doc ID   #[id number]  Job ID #[id number]    CC:

## 2018-06-29 VITALS
OXYGEN SATURATION: 98 % | DIASTOLIC BLOOD PRESSURE: 54 MMHG | SYSTOLIC BLOOD PRESSURE: 102 MMHG | RESPIRATION RATE: 12 BRPM | TEMPERATURE: 98 F | HEIGHT: 64 IN | HEART RATE: 65 BPM | BODY MASS INDEX: 24.46 KG/M2 | WEIGHT: 143.31 LBS

## 2018-06-29 LAB
ANION GAP SERPL CALC-SCNC: 7 MMOL/L
BASOPHILS # BLD AUTO: 0 K/UL
BASOPHILS NFR BLD: 0.4 %
BUN SERPL-MCNC: 10 MG/DL
CALCIUM SERPL-MCNC: 9.2 MG/DL
CHLORIDE SERPL-SCNC: 104 MMOL/L
CO2 SERPL-SCNC: 25 MMOL/L
CREAT SERPL-MCNC: 0.7 MG/DL
DIFFERENTIAL METHOD: ABNORMAL
EOSINOPHIL # BLD AUTO: 0.1 K/UL
EOSINOPHIL NFR BLD: 0.6 %
ERYTHROCYTE [DISTWIDTH] IN BLOOD BY AUTOMATED COUNT: 13.4 %
EST. GFR  (AFRICAN AMERICAN): >60 ML/MIN/1.73 M^2
EST. GFR  (NON AFRICAN AMERICAN): >60 ML/MIN/1.73 M^2
GLUCOSE SERPL-MCNC: 96 MG/DL
HCT VFR BLD AUTO: 29.1 %
HGB BLD-MCNC: 9.6 G/DL
LYMPHOCYTES # BLD AUTO: 2 K/UL
LYMPHOCYTES NFR BLD: 21.7 %
MCH RBC QN AUTO: 30.5 PG
MCHC RBC AUTO-ENTMCNC: 32.9 G/DL
MCV RBC AUTO: 93 FL
MONOCYTES # BLD AUTO: 0.6 K/UL
MONOCYTES NFR BLD: 6.5 %
NEUTROPHILS # BLD AUTO: 6.6 K/UL
NEUTROPHILS NFR BLD: 70.8 %
PLATELET # BLD AUTO: 197 K/UL
PMV BLD AUTO: 9 FL
POTASSIUM SERPL-SCNC: 3.9 MMOL/L
RBC # BLD AUTO: 3.15 M/UL
SODIUM SERPL-SCNC: 136 MMOL/L
WBC # BLD AUTO: 9.3 K/UL

## 2018-06-29 PROCEDURE — 27000221 HC OXYGEN, UP TO 24 HOURS

## 2018-06-29 PROCEDURE — 94799 UNLISTED PULMONARY SVC/PX: CPT

## 2018-06-29 PROCEDURE — 25000003 PHARM REV CODE 250: Performed by: INTERNAL MEDICINE

## 2018-06-29 PROCEDURE — 99238 HOSP IP/OBS DSCHRG MGMT 30/<: CPT | Mod: ,,, | Performed by: INTERNAL MEDICINE

## 2018-06-29 PROCEDURE — 94761 N-INVAS EAR/PLS OXIMETRY MLT: CPT

## 2018-06-29 PROCEDURE — 25000003 PHARM REV CODE 250: Performed by: PHYSICIAN ASSISTANT

## 2018-06-29 PROCEDURE — 85025 COMPLETE CBC W/AUTO DIFF WBC: CPT

## 2018-06-29 PROCEDURE — 36415 COLL VENOUS BLD VENIPUNCTURE: CPT

## 2018-06-29 PROCEDURE — 99900035 HC TECH TIME PER 15 MIN (STAT)

## 2018-06-29 PROCEDURE — 97162 PT EVAL MOD COMPLEX 30 MIN: CPT

## 2018-06-29 PROCEDURE — 63600175 PHARM REV CODE 636 W HCPCS: Performed by: ANESTHESIOLOGY

## 2018-06-29 PROCEDURE — 25000003 PHARM REV CODE 250: Performed by: NURSE PRACTITIONER

## 2018-06-29 PROCEDURE — 94770 HC EXHALED C02 TEST: CPT

## 2018-06-29 PROCEDURE — 63600175 PHARM REV CODE 636 W HCPCS: Performed by: PHYSICIAN ASSISTANT

## 2018-06-29 PROCEDURE — 80048 BASIC METABOLIC PNL TOTAL CA: CPT

## 2018-06-29 RX ORDER — HYDROXYZINE PAMOATE 25 MG/1
25 CAPSULE ORAL ONCE
Status: COMPLETED | OUTPATIENT
Start: 2018-06-29 | End: 2018-06-29

## 2018-06-29 RX ORDER — OXYCODONE AND ACETAMINOPHEN 10; 325 MG/1; MG/1
1 TABLET ORAL EVERY 6 HOURS PRN
Refills: 0
Start: 2018-06-29 | End: 2018-08-01

## 2018-06-29 RX ADMIN — HYDROXYZINE PAMOATE 25 MG: 25 CAPSULE ORAL at 05:06

## 2018-06-29 RX ADMIN — Medication: at 12:06

## 2018-06-29 RX ADMIN — ONDANSETRON 8 MG: 4 TABLET, ORALLY DISINTEGRATING ORAL at 05:06

## 2018-06-29 RX ADMIN — MUPIROCIN 1 G: 20 OINTMENT TOPICAL at 09:06

## 2018-06-29 RX ADMIN — DIPHENHYDRAMINE HYDROCHLORIDE 25 MG: 25 CAPSULE ORAL at 08:06

## 2018-06-29 RX ADMIN — OXYCODONE HYDROCHLORIDE 10 MG: 10 TABLET ORAL at 11:06

## 2018-06-29 RX ADMIN — DOCUSATE SODIUM 100 MG: 100 CAPSULE, LIQUID FILLED ORAL at 09:06

## 2018-06-29 RX ADMIN — CEFAZOLIN SODIUM 2 G: 2 SOLUTION INTRAVENOUS at 08:06

## 2018-06-29 RX ADMIN — PANTOPRAZOLE SODIUM 40 MG: 40 TABLET, DELAYED RELEASE ORAL at 09:06

## 2018-06-29 RX ADMIN — ACETAMINOPHEN 1000 MG: 10 INJECTION, SOLUTION INTRAVENOUS at 05:06

## 2018-06-29 NOTE — PLAN OF CARE
Problem: Patient Care Overview  Goal: Plan of Care Review  Outcome: Ongoing (interventions implemented as appropriate)  Vitals stable. POC/Meds reviewed, pt verbalized understanding. Mother at bedside. Soft collar in place. KAYLEE drain intact with minimal output. PCA in use. IVF infusing, IV abx administered per order.Teds/SCDs on. Neuro intact. Turns/repositions self. TOlerating diet. KAYLEE drain, PCA, Suarez to be removed at 0600 this AM, and dressing to be changed at that time as well. Pt aware and whiteboard updated. Hourly/Q2hr rounding performed, safety maintained. Bed in lowest position, wheels locked, SR upx2, call light in easy reach. Will continue to monitor

## 2018-06-29 NOTE — DISCHARGE SUMMARY
Discharge Summary  Hospital Medicine    Admit Date: 6/28/2018    Date and Time: 6/29/201811:04 AM    Discharge Attending Physician: Adan Brownlee MD    Primary Care Physician: Annamarie Garcia NP    Diagnoses:  Active Hospital Problems    Diagnosis  POA    *Cervical disc disorder at C4-C5 level with radiculopathy s/p C4-5 Cervical spine disectomy, arthroplasty and decompression [M50.121]  Yes      Resolved Hospital Problems    Diagnosis Date Resolved POA   No resolved problems to display.     Discharged Condition: Good    Hospital Course:   Patient is a 27 y.o. female admitted to Hospitalist Service from Operation Room s/p C4-5 Cervical spine disectomy, arthroplasty and decompression performed by Dr. Brennan. Patient reportedly has past medical history significant for neck pain and history of bronchial asthma. Post-operatively, patient denied chest pain, shortness of breath, abdominal pain, nausea, vomiting, headache, vision changes, focal neuro-deficits, cough or fever. Patient was admitted to Hospitalist medicine service. Patient was followed by orthopedics. Post-operative, patient did well. Pain adequately controlled. Patient participated with physical therapy. Fall precautions discussed with the patient. Patient to follow up with primary care physician next week and orthopedic doctor in 2 weeks. Patient experienced transient blurry vision which improved. CT head is negative for acute process. In case of chest pain, shortness of breath, stroke or stroke like symptoms, high grade fever or any signs or symptoms of surgical site wound infection symptoms, patient to return to nearest emergency room as soon as possible. Patient was discharged home in stable condition with following discharge plan of care.     Consults: Dr. Brennan    Significant Diagnostic Studies:   CT head: Normal head.    Microbiology Results (last 7 days)     ** No results found for the last 168 hours. **        Special Treatments/Procedures: as  above  Disposition: Home or Self Care    Medications:  Reconciled Home Medications: Current Discharge Medication List      START taking these medications    Details   oxyCODONE-acetaminophen (PERCOCET)  mg per tablet Take 1 tablet by mouth every 6 (six) hours as needed for Pain.  Refills: 0         CONTINUE these medications which have NOT CHANGED    Details   ALBUTEROL SULFATE (PROAIR HFA INHL) Inhale into the lungs.         STOP taking these medications       HYDROcodone-acetaminophen (NORCO) 7.5-325 mg per tablet Comments:   Reason for Stopping:         clindamycin (CLEOCIN) 300 MG capsule Comments:   Reason for Stopping:               Discharge Procedure Orders  Diet Adult Regular     Call MD for:   Order Comments: For worsening symptoms, chest pain, shortness of breath, increased abdominal pain, high grade fever, stroke or stroke like symptoms, immediately go to the nearest Emergency Room or call 911 as soon as possible.     Other restrictions (specify):   Scheduling Instructions: Neck collar use as per Dr. Brennan's recommendations       Follow-up Information     Andrew Brennan MD On 7/11/2018.    Specialties:  Orthopedic Surgery, General Surgery, Surgery  Why:  post op followup 7/11/2018 @ 215pm  Contact information:  [address] [address]  [phone]             Annamarie Garcia NP In 1 week.    Specialty:  Family Medicine  Contact information:  [address] [id number] [phone]

## 2018-06-29 NOTE — NURSING
Suarez removed at this time, due to void by 12/noon. Pt aware, whiteboard updated. Will update day nurse upon shift change.     KAYLEE drain removed per order & dressing changed at this time.    PCA taken down at this time as well, pain 4/10. Hung scheduled tylenol.     Will continue to monitor.

## 2018-06-29 NOTE — PT/OT/SLP EVAL
"Physical Therapy Evaluation and Discharge Note    Patient Name:  Osman Rm   MRN:  [id number]    Recommendations:     Discharge Recommendations:  home   Discharge Equipment Recommendations: none   Barriers to discharge: None    Assessment:     Osman Rm is a 27 y.o. female admitted with a medical diagnosis of Cervical disc disorder at C4-C5 level with radiculopathy. .  At this time, patient is functioning at their prior level of function and does not require further acute PT services.     Recent Surgery: Procedure(s) (LRB):  DISCECTOMY, SPINE, CERVICAL, ANTERIOR APPROACH, ARTHROPLASTY C4-5 (N/A)  DECOMPRESSION (N/A) 1 Day Post-Op    Plan:     During this hospitalization, patient does not require further acute PT services.  Please re-consult if situation changes.     Plan of Care Reviewed with:      Subjective     Communicated with nurse Sigala prior to session.  Patient found supine in bed resting upon PT entry to room, agreeable to evaluation.      Chief Complaint: pain in neck  Patient comments/goals: "I'm fine" after gait activity  Pain/Comfort:  · Pain Rating 1: 6/10  · Location - Orientation 1: generalized  · Location 1: neck  · Pain Addressed 1: Reposition, Pre-medicate for activity    Patients cultural, spiritual, [employer]ism conflicts given the current situation:      Living Environment:  Pt lives with family, 2 steps to enter home and no handrails  Prior to admission, patients level of function was independent with all mobility.  Patient has the following equipment: none.  DME owned (not currently used): none.  Upon discharge, patient will have assistance from family.    Objective:     Patient found with: SCD, telemetry, peripheral IV     General Precautions: Standard, fall   Orthopedic Precautions:spinal precautions   Braces: Aspen collar (PRN to comfort, soft collar)     Exams:  · RLE ROM: WFL  · RLE Strength: WFL  · LLE ROM: WFL  · LLE Strength: WFL    Functional Mobility:  · Bed Mobility:   "   · Supine to Sit: independence  · Sit to Supine: independence  · Transfers:     · Sit to Stand:  independence with no AD  · Bed to Chair: modified independence with  rolling walker  using  Stand Pivot  · Gait: 250ft mod I with RW    AM-PAC 6 CLICK MOBILITY  Total Score:21       Therapeutic Activities and Exercises:       Patient left supine with all lines intact, call button in reach and nursing notified.    GOALS:    Physical Therapy Goals        Problem: Physical Therapy Goal    Goal Priority Disciplines Outcome Goal Variances Interventions   Physical Therapy Goal     PT/OT, PT                      History:     Past Medical History:   Diagnosis Date    Asthma        Past Surgical History:   Procedure Laterality Date    SPINAL FUSION      TONSILLECTOMY         Clinical Decision Making:     History  Co-morbidities and personal factors that may impact the plan of care Examination  Body Structures and Functions, activity limitations and participation restrictions that may impact the plan of care Clinical Presentation   Decision Making/ Complexity Score   Co-morbidities:   [] Time since onset of injury / illness / exacerbation  [] Status of current condition  []Patient's cognitive status and safety concerns    [] Multiple Medical Problems (see med hx)  Personal Factors:   [] Patient's age  [] Prior Level of function   [] Patient's home situation (environment and family support)  [] Patient's level of motivation  [] Expected progression of patient      HISTORY:(criteria)    [] 49697 - no personal factors/history    [] 22255 - has 1-2 personal factor/comorbidity     [] 83927 - has >3 personal factor/comorbidity     Body Regions:  [] Objective examination findings  [] Head     []  Neck  [] Trunk   [] Upper Extremity  [] Lower Extremity    Body Systems:  [] For communication ability, affect, cognition, language, and learning style: the assessment of the ability to make needs known, consciousness, orientation (person,  place, and time), expected emotional /behavioral responses, and learning preferences (eg, learning barriers, education  needs)  [] For the neuromuscular system: a general assessment of gross coordinated movement (eg, balance, gait, locomotion, transfers, and transitions) and motor function  (motor control and motor learning)  [] For the musculoskeletal system: the assessment of gross symmetry, gross range of motion, gross strength, height, and weight  [] For the integumentary system: the assessment of pliability(texture), presence of scar formation, skin color, and skin integrity  [] For cardiovascular/pulmonary system: the assessment of heart rate, respiratory rate, blood pressure, and edema     Activity limitations:    [] Patient's cognitive status and saf ety concerns          [] Status of current condition      [] Weight bearing restriction  [] Cardiopulmunary Restriction    Participation Restrictions:   [] Goals and goal agreement with the patient     [] Rehab potential (prognosis) and probable outcome      Examination of Body System: (criteria)    [] 20076 - addressing 1-2 elements    [] 63534 - addressing a total of 3 or more elements     [] 06189 -  Addressing a total of 4 or more elements         Clinical Presentation: (criteria)  Choose one     On examination of body system using standardized tests and measures patient presents with (CHOOSE ONE) elements from any of the following: body structures and functions, activity limitations, and/or participation restrictions.  Leading to a clinical presentation that is considered (CHOOSE ONE)                              Clinical Decision Making  (Eval Complexity):  Choose One     Time Tracking:     PT Received On: 06/29/18  PT Start Time: 1020     PT Stop Time: 1048  PT Total Time (min): 28 min     Billable Minutes: Evaluation 28      Concepcion Naomi PT  06/29/2018

## 2018-06-29 NOTE — PROGRESS NOTES
[facility]  Orthopedics  Progress Note    Patient Name: Osman Rm  MRN: [id number]  Admission Date: 6/28/2018  Hospital Length of Stay: 1 days  Attending Provider: Adan Brownlee MD  Primary Care Provider: Annamarie Garcia NP  Follow-up For: Procedure(s) (LRB):  DISCECTOMY, SPINE, CERVICAL, ANTERIOR APPROACH, ARTHROPLASTY C4-5 (N/A)  DECOMPRESSION (N/A)    Post-Operative Day: 1 Day Post-Op  Subjective:     Principal Problem:Cervical disc disorder at C4-C5 level with radiculopathy    Principal Orthopedic Problem: S/p cervical TDA    Interval History: Fair motivation, anxiety, hoarseness    Review of patient's allergies indicates:   Allergen Reactions    Adhesive tape-silicones Hives       Current Facility-Administered Medications   Medication    acetaminophen tablet 650 mg    albuterol sulfate nebulizer solution 2.5 mg    aluminum-magnesium hydroxide-simethicone 200-200-20 mg/5 mL suspension 30 mL    bisacodyl suppository 10 mg    cefazolin (ANCEF) 2 gram in dextrose 5% 50 mL IVPB (premix)    diphenhydrAMINE capsule 25 mg    docusate sodium capsule 100 mg    HYDROmorphone injection 1 mg    HYDROmorphone injection 2 mg    HYDROmorphone PCA in 0.9 % NaCl 6 Mg/30 mL (0.2 mg/mL)    ketorolac injection 15 mg    lactated ringers infusion    lactated ringers infusion    mupirocin 2 % ointment 1 g    naloxone 0.4 mg/mL injection 0.02 mg    ondansetron disintegrating tablet 8 mg    oxyCODONE immediate release tablet 15 mg    oxyCODONE immediate release tablet 5 mg    oxyCODONE immediate release tablet Tab 10 mg    pantoprazole EC tablet 40 mg    promethazine (PHENERGAN) 6.25 mg in dextrose 5 % 50 mL IVPB    ramelteon tablet 8 mg    senna-docusate 8.6-50 mg per tablet 2 tablet    sodium chloride 0.9% flush 3 mL     Objective:     Vital Signs (Most Recent):  Temp: 97.9 °F (36.6 °C) (06/29/18 0401)  Pulse: 76 (06/29/18 0524)  Resp: 12 (06/29/18 0524)  BP: (!) 104/58 (06/29/18  "0524)  SpO2: 100 % (06/29/18 0524) Vital Signs (24h Range):  Temp:  [97.2 °F (36.2 °C)-98.5 °F (36.9 °C)] 97.9 °F (36.6 °C)  Pulse:  [54-90] 76  Resp:  [6-53] 12  SpO2:  [96 %-100 %] 100 %  BP: ()/(50-73) 104/58     Weight: 64.9 kg (143 lb)  Height: 5' 4" (162.6 cm)  Body mass index is 24.55 kg/m².      Intake/Output Summary (Last 24 hours) at 06/29/18 0720  Last data filed at 06/29/18 0600   Gross per 24 hour   Intake             1206 ml   Output             1185 ml   Net               21 ml       General    Vitals reviewed.  Constitutional: She is oriented to person, place, and time. She appears well-developed and well-nourished.   Pulmonary/Chest: Effort normal.   Abdominal: Soft. Bowel sounds are normal.   Neurological: She is alert and oriented to person, place, and time.   Psychiatric: She has a normal mood and affect. Her behavior is normal.   Depressed affect         Back (L-Spine & T-Spine) / Neck (C-Spine) Exam     Comments:  Anterior cervical incision looks great; KAYLEE drain removed. Dressing changed this am      Reflexes     Left Side  Biceps:  2+  Triceps:  2+  Brachioradialis:  2+  Left Scott's Sign:  Absent  Ankle Clonus:  absent    Right Side   Biceps:  2+  Triceps:  2+  Brachioradialis:  2+  Right Scott's Sign:  absent  Ankle Clonus:  absent      Significant Labs:   CBC:   Recent Labs  Lab 06/28/18  1044 06/29/18  0459   WBC 5.20 9.30   HGB 10.1* 9.6*   HCT 30.9* 29.1*    197     CMP:   Recent Labs  Lab 06/28/18  1044 06/29/18  0459    136   K 3.6 3.9    104   CO2 21* 25    96   BUN 12 10   CREATININE 0.8 0.7   CALCIUM 9.3 9.2   ANIONGAP 11 7*   EGFRNONAA >60 >60     All pertinent labs within the past 24 hours have been reviewed.    Significant Imaging: None    Assessment/Plan:     * Cervical disc disorder at C4-C5 level with radiculopathy s/p C4-5 Cervical spine disectomy, arthroplasty and decompression    Max encouragement for OOB activity  Stable and may be DC'd " home; no HH needed              JOSS ANDRES  Orthopedics  [facility]

## 2018-06-29 NOTE — PLAN OF CARE
Problem: Patient Care Overview  Goal: Plan of Care Review  Outcome: Ongoing (interventions implemented as appropriate)  NC 2 lpm and ETCO2 in use with at bedside to use Q1 hrs

## 2018-06-29 NOTE — NURSING
Pt was discharges home per Md orders, pt IV was removed pt tolerated well no hematoma or swelling noted, pt received discharge instructions, eduction handout and prescriptions. VSS and noted, pt is aware of her follow up appt on 7/11/17 with Dr. Brennan pt and mother verb understanding nad noted at the time of discharge

## 2018-06-29 NOTE — PLAN OF CARE
06/29/18 1055   Discharge Assessment   Assessment Type Discharge Planning Assessment   Confirmed/corrected address and phone number on facesheet? Yes   Assessment information obtained from? Patient   Communicated expected length of stay with patient/caregiver yes   Prior to hospitilization cognitive status: Alert/Oriented   Prior to hospitalization functional status: Independent   Current cognitive status: Alert/Oriented   Current Functional Status: Independent   Lives With spouse   Able to Return to Prior Arrangements yes   Is patient able to care for self after discharge? Yes   Patient's perception of discharge disposition home or selfcare   Readmission Within The Last 30 Days no previous admission in last 30 days   Patient currently being followed by outpatient case management? No   Patient currently receives any other outside agency services? No   Equipment Currently Used at Home none   Do you have any problems affording any of your prescribed medications? No   Is the patient taking medications as prescribed? yes   Does the patient have transportation home? Yes   Transportation Available family or friend will provide   Does the patient receive services at the [facility]? No   Discharge Plan A Home   Patient/Family In Agreement With Plan yes

## 2018-06-29 NOTE — SUBJECTIVE & OBJECTIVE
"Principal Problem:Cervical disc disorder at C4-C5 level with radiculopathy    Principal Orthopedic Problem: S/p cervical TDA    Interval History: Fair motivation, anxiety, hoarseness    Review of patient's allergies indicates:   Allergen Reactions    Adhesive tape-silicones Hives       Current Facility-Administered Medications   Medication    acetaminophen tablet 650 mg    albuterol sulfate nebulizer solution 2.5 mg    aluminum-magnesium hydroxide-simethicone 200-200-20 mg/5 mL suspension 30 mL    bisacodyl suppository 10 mg    cefazolin (ANCEF) 2 gram in dextrose 5% 50 mL IVPB (premix)    diphenhydrAMINE capsule 25 mg    docusate sodium capsule 100 mg    HYDROmorphone injection 1 mg    HYDROmorphone injection 2 mg    HYDROmorphone PCA in 0.9 % NaCl 6 Mg/30 mL (0.2 mg/mL)    ketorolac injection 15 mg    lactated ringers infusion    lactated ringers infusion    mupirocin 2 % ointment 1 g    naloxone 0.4 mg/mL injection 0.02 mg    ondansetron disintegrating tablet 8 mg    oxyCODONE immediate release tablet 15 mg    oxyCODONE immediate release tablet 5 mg    oxyCODONE immediate release tablet Tab 10 mg    pantoprazole EC tablet 40 mg    promethazine (PHENERGAN) 6.25 mg in dextrose 5 % 50 mL IVPB    ramelteon tablet 8 mg    senna-docusate 8.6-50 mg per tablet 2 tablet    sodium chloride 0.9% flush 3 mL     Objective:     Vital Signs (Most Recent):  Temp: 97.9 °F (36.6 °C) (06/29/18 0401)  Pulse: 76 (06/29/18 0524)  Resp: 12 (06/29/18 0524)  BP: (!) 104/58 (06/29/18 0524)  SpO2: 100 % (06/29/18 0524) Vital Signs (24h Range):  Temp:  [97.2 °F (36.2 °C)-98.5 °F (36.9 °C)] 97.9 °F (36.6 °C)  Pulse:  [54-90] 76  Resp:  [6-53] 12  SpO2:  [96 %-100 %] 100 %  BP: ()/(50-73) 104/58     Weight: 64.9 kg (143 lb)  Height: 5' 4" (162.6 cm)  Body mass index is 24.55 kg/m².      Intake/Output Summary (Last 24 hours) at 06/29/18 0720  Last data filed at 06/29/18 0600   Gross per 24 hour   Intake           "   1206 ml   Output             1185 ml   Net               21 ml       General    Vitals reviewed.  Constitutional: She is oriented to person, place, and time. She appears well-developed and well-nourished.   Pulmonary/Chest: Effort normal.   Abdominal: Soft. Bowel sounds are normal.   Neurological: She is alert and oriented to person, place, and time.   Psychiatric: She has a normal mood and affect. Her behavior is normal.   Depressed affect         Back (L-Spine & T-Spine) / Neck (C-Spine) Exam     Comments:  Anterior cervical incision looks great; KAYLEE drain removed. Dressing changed this am      Reflexes     Left Side  Biceps:  2+  Triceps:  2+  Brachioradialis:  2+  Left Scott's Sign:  Absent  Ankle Clonus:  absent    Right Side   Biceps:  2+  Triceps:  2+  Brachioradialis:  2+  Right Scott's Sign:  absent  Ankle Clonus:  absent      Significant Labs:   CBC:   Recent Labs  Lab 06/28/18  1044 06/29/18  0459   WBC 5.20 9.30   HGB 10.1* 9.6*   HCT 30.9* 29.1*    197     CMP:   Recent Labs  Lab 06/28/18  1044 06/29/18  0459    136   K 3.6 3.9    104   CO2 21* 25    96   BUN 12 10   CREATININE 0.8 0.7   CALCIUM 9.3 9.2   ANIONGAP 11 7*   EGFRNONAA >60 >60     All pertinent labs within the past 24 hours have been reviewed.    Significant Imaging: None

## 2018-06-29 NOTE — NURSING
Pt with complaints of bilateral blurred vision and a headache. Ritesh COREAS for Dr. Brennan here and deferred this problem to Dr. Brownlee. Called Dr. Brownlee and obtained orders for CT head without contrast at this time.

## 2018-06-29 NOTE — PLAN OF CARE
06/29/18 1030   Discharge Assessment   Assessment Type Discharge Planning Reassessment   CM added post op followup appointment to avs.

## 2018-06-29 NOTE — PLAN OF CARE
06/29/18 1707   Final Note   Assessment Type Final Discharge Note   Discharge Disposition Home

## 2018-06-29 NOTE — PLAN OF CARE
Problem: Physical Therapy Goal  Goal: Physical Therapy Goal  PT eval completed. Pt presents supine in bed w/ complaints of pain but agreeable to PT services. Pt was able to demonstrate independence with bed mobility, transfers and modified independence with gait. Tolerated gait activity 250ft mod I with RW. RW provided due to complaints of slight dizziness. Pt left supine in bed with needs in reach and lines intact. Nursing notified.

## 2018-06-29 NOTE — NURSING
Pt voided adequate amount of urine at this time. Pt sitting up in chair for lunch. Call light in easy reach. Pt family at bedside at this time.

## 2018-07-01 ENCOUNTER — NURSE TRIAGE (OUTPATIENT)
Dept: ADMINISTRATIVE | Facility: CLINIC | Age: 28
End: 2018-07-01

## 2018-07-02 ENCOUNTER — NURSE TRIAGE (OUTPATIENT)
Dept: ADMINISTRATIVE | Facility: CLINIC | Age: 28
End: 2018-07-02

## 2018-07-02 ENCOUNTER — HOSPITAL ENCOUNTER (EMERGENCY)
Facility: HOSPITAL | Age: 28
Discharge: HOME | End: 2018-07-02
Attending: EMERGENCY MEDICINE
Payer: COMMERCIAL

## 2018-07-02 VITALS
RESPIRATION RATE: 20 BRPM | TEMPERATURE: 98 F | DIASTOLIC BLOOD PRESSURE: 78 MMHG | HEIGHT: 64 IN | WEIGHT: 143 LBS | BODY MASS INDEX: 24.41 KG/M2 | SYSTOLIC BLOOD PRESSURE: 118 MMHG | OXYGEN SATURATION: 100 % | HEART RATE: 80 BPM

## 2018-07-02 DIAGNOSIS — K59.00 CONSTIPATION, UNSPECIFIED CONSTIPATION TYPE: ICD-10-CM

## 2018-07-02 DIAGNOSIS — R10.84 ABDOMINAL PAIN, GENERALIZED: Primary | ICD-10-CM

## 2018-07-02 LAB
ALBUMIN SERPL BCP-MCNC: 3.8 G/DL
ALP SERPL-CCNC: 47 U/L
ALT SERPL W/O P-5'-P-CCNC: 20 U/L
ANION GAP SERPL CALC-SCNC: 7 MMOL/L
AST SERPL-CCNC: 20 U/L
B-HCG UR QL: NEGATIVE
BASOPHILS # BLD AUTO: 0.02 K/UL
BASOPHILS NFR BLD: 0.5 %
BILIRUB SERPL-MCNC: 0.7 MG/DL
BUN SERPL-MCNC: 6 MG/DL
CALCIUM SERPL-MCNC: 9.9 MG/DL
CHLORIDE SERPL-SCNC: 103 MMOL/L
CO2 SERPL-SCNC: 28 MMOL/L
CREAT SERPL-MCNC: 0.7 MG/DL
CTP QC/QA: YES
DIFFERENTIAL METHOD: ABNORMAL
EOSINOPHIL # BLD AUTO: 0.2 K/UL
EOSINOPHIL NFR BLD: 4.1 %
ERYTHROCYTE [DISTWIDTH] IN BLOOD BY AUTOMATED COUNT: 12.8 %
EST. GFR  (AFRICAN AMERICAN): >60 ML/MIN/1.73 M^2
EST. GFR  (NON AFRICAN AMERICAN): >60 ML/MIN/1.73 M^2
GLUCOSE SERPL-MCNC: 92 MG/DL
HCT VFR BLD AUTO: 31.5 %
HGB BLD-MCNC: 10.1 G/DL
LACTATE SERPL-SCNC: 0.8 MMOL/L
LIPASE SERPL-CCNC: 23 U/L
LYMPHOCYTES # BLD AUTO: 1.2 K/UL
LYMPHOCYTES NFR BLD: 34.1 %
MCH RBC QN AUTO: 29.6 PG
MCHC RBC AUTO-ENTMCNC: 32.1 G/DL
MCV RBC AUTO: 92 FL
MONOCYTES # BLD AUTO: 0.3 K/UL
MONOCYTES NFR BLD: 6.9 %
NEUTROPHILS # BLD AUTO: 2 K/UL
NEUTROPHILS NFR BLD: 54.4 %
PLATELET # BLD AUTO: 201 K/UL
PMV BLD AUTO: 10.4 FL
POTASSIUM SERPL-SCNC: 3.8 MMOL/L
PROT SERPL-MCNC: 7.1 G/DL
RBC # BLD AUTO: 3.41 M/UL
SODIUM SERPL-SCNC: 138 MMOL/L
WBC # BLD AUTO: 3.64 K/UL

## 2018-07-02 PROCEDURE — 83605 ASSAY OF LACTIC ACID: CPT

## 2018-07-02 PROCEDURE — 96375 TX/PRO/DX INJ NEW DRUG ADDON: CPT

## 2018-07-02 PROCEDURE — 25000003 PHARM REV CODE 250: Performed by: EMERGENCY MEDICINE

## 2018-07-02 PROCEDURE — 96361 HYDRATE IV INFUSION ADD-ON: CPT

## 2018-07-02 PROCEDURE — 96372 THER/PROPH/DIAG INJ SC/IM: CPT | Mod: 59

## 2018-07-02 PROCEDURE — 85025 COMPLETE CBC W/AUTO DIFF WBC: CPT

## 2018-07-02 PROCEDURE — 81025 URINE PREGNANCY TEST: CPT | Performed by: EMERGENCY MEDICINE

## 2018-07-02 PROCEDURE — 63600175 PHARM REV CODE 636 W HCPCS: Performed by: EMERGENCY MEDICINE

## 2018-07-02 PROCEDURE — 83690 ASSAY OF LIPASE: CPT

## 2018-07-02 PROCEDURE — 99284 EMERGENCY DEPT VISIT MOD MDM: CPT | Mod: 25

## 2018-07-02 PROCEDURE — 80053 COMPREHEN METABOLIC PANEL: CPT

## 2018-07-02 PROCEDURE — 96374 THER/PROPH/DIAG INJ IV PUSH: CPT

## 2018-07-02 RX ORDER — ONDANSETRON 4 MG/1
8 TABLET, FILM COATED ORAL 2 TIMES DAILY
COMMUNITY
End: 2018-09-12

## 2018-07-02 RX ORDER — ONDANSETRON 4 MG/1
4 TABLET, ORALLY DISINTEGRATING ORAL EVERY 8 HOURS PRN
Qty: 15 TABLET | Refills: 0 | Status: SHIPPED | OUTPATIENT
Start: 2018-07-02 | End: 2018-07-09

## 2018-07-02 RX ORDER — MORPHINE SULFATE 4 MG/ML
4 INJECTION, SOLUTION INTRAMUSCULAR; INTRAVENOUS
Status: COMPLETED | OUTPATIENT
Start: 2018-07-02 | End: 2018-07-02

## 2018-07-02 RX ORDER — ONDANSETRON 2 MG/ML
4 INJECTION INTRAMUSCULAR; INTRAVENOUS
Status: COMPLETED | OUTPATIENT
Start: 2018-07-02 | End: 2018-07-02

## 2018-07-02 RX ADMIN — ONDANSETRON 4 MG: 2 INJECTION INTRAMUSCULAR; INTRAVENOUS at 02:07

## 2018-07-02 RX ADMIN — METHYLNALTREXONE BROMIDE 12 MG: 12 INJECTION, SOLUTION SUBCUTANEOUS at 03:07

## 2018-07-02 RX ADMIN — MORPHINE SULFATE 4 MG: 4 INJECTION INTRAVENOUS at 02:07

## 2018-07-02 RX ADMIN — SODIUM CHLORIDE 1000 ML: 0.9 INJECTION, SOLUTION INTRAVENOUS at 02:07

## 2018-07-02 NOTE — ED NOTES
27 year old female presents to ed cc of abdominal pain/ changes in appetite that began on . Patient reports recent surgery to neck for cervical disk done last Thursday. Patient states last bm on Wednesday. Patient awake alert ox4 able to identify name and  on armband. Call light at bedside nurse will continue to monitor

## 2018-07-02 NOTE — DISCHARGE INSTRUCTIONS
Miralax 3 times per day, dulcolax 2 times per day, colace 2 times per day.  You may also take magnesium citrate 300mL once. Take it all at the same time.      Thank you for choosing [facility]! We appreciate you coming to us for your medical care. We hope you feel better soon! Please come back to [facility] for all of your future medical needs.      Sincerely,    Jose L Mcbride MD  Medical Director  Emergency Department  [facility]

## 2018-07-02 NOTE — ED PROVIDER NOTES
Encounter Date: 7/2/2018    SCRIBE #1 NOTE: I, Bill Devine, am scribing for, and in the presence of,  Dr. Jose L Mcbride. I have scribed the entire note.       History     Chief Complaint   Patient presents with    Abdominal Pain     s/p cervical disc surgery last Thursday in [place]. Today with c/o abdominal pain, nausea, dizziness. States called her surgeon's office and told to come here for evaluation of possible SBO. No fever reported. LBM last Wednesday with laxatives taken today. Presents awake, alert, oriented. No distress.      This is a 27 y.o. female who has a past medical history of Asthma.   The patient presents to the Emergency Department with constipation, abdominal pain 4 days post-op for cervical disc surgery .   Symptoms are associated with dizziness, nausea, decreased appetite.  Last BM Wednesday, 1 day before surgery.  Pt denies fever.   Symptoms unrelieved by laxatives, stool softeners, drinking water.  Unable to produce BM, very sparingly passing gas beginning today.  Patient has no prior history of similar symptoms.   C-Collar in place.  Pt has a past surgical history that includes Tonsillectomy; Spinal fusion; and Anterior cervical discectomy w/ fusion (N/A, 6/28/2018).        The history is provided by the patient.     Review of patient's allergies indicates:   Allergen Reactions    Adhesive tape-silicones Hives     Past Medical History:   Diagnosis Date    Asthma      Past Surgical History:   Procedure Laterality Date    ANTERIOR CERVICAL DISCECTOMY W/ FUSION N/A 6/28/2018    Procedure: DISCECTOMY, SPINE, CERVICAL, ANTERIOR APPROACH, ARTHROPLASTY C4-5;  Surgeon: Andrew Brennan MD;  Location: [facility];  Service: Orthopedics;  Laterality: N/A;    SPINAL FUSION      TONSILLECTOMY       History reviewed. No pertinent family history.  Social History   Substance Use Topics    Smoking status: Never Smoker    Smokeless tobacco: Never Used    Alcohol use No     Review of Systems    Constitutional: Negative for activity change, appetite change, chills, diaphoresis, fatigue and fever.   HENT: Negative for sore throat.    Respiratory: Negative for cough, chest tightness and shortness of breath.    Cardiovascular: Negative for chest pain and palpitations.   Gastrointestinal: Positive for abdominal pain, constipation and nausea. Negative for abdominal distention, diarrhea and vomiting.   Endocrine: Negative for polydipsia and polyphagia.   Genitourinary: Negative for difficulty urinating, dysuria and flank pain.   Musculoskeletal: Negative for arthralgias.   Skin: Negative for pallor and rash.   Neurological: Negative for dizziness and headaches.   Psychiatric/Behavioral: Negative for confusion.       Physical Exam     Initial Vitals [07/02/18 1323]   BP Pulse Resp Temp SpO2   (!) 114/57 81 16 98.3 °F (36.8 °C) 98 %      MAP       --         Physical Exam    Nursing note and vitals reviewed.  Constitutional: She appears well-developed and well-nourished. She is not diaphoretic. No distress.   HENT:   Head: Normocephalic and atraumatic.   Eyes: Pupils are equal, round, and reactive to light.   Neck: Normal range of motion. Neck supple.   Soft C-collar in place   Cardiovascular: Normal rate, regular rhythm and normal heart sounds.   Pulmonary/Chest: Breath sounds normal.   Abdominal: Soft. She exhibits distension (mild). Bowel sounds are decreased. There is generalized tenderness (minimal). There is no rebound and no guarding.   decreased bowel sounds but present     Musculoskeletal: Normal range of motion.   Neurological: She is alert and oriented to person, place, and time.   Skin: Skin is dry.         ED Course   Procedures  Labs Reviewed   CBC W/ AUTO DIFFERENTIAL - Abnormal; Notable for the following:        Result Value    WBC 3.64 (*)     RBC 3.41 (*)     Hemoglobin 10.1 (*)     Hematocrit 31.5 (*)     All other components within normal limits   COMPREHENSIVE METABOLIC PANEL - Abnormal;  Notable for the following:     Alkaline Phosphatase 47 (*)     Anion Gap 7 (*)     All other components within normal limits   LIPASE   LACTIC ACID, PLASMA   POCT URINE PREGNANCY          Imaging Results          X-Ray Abdomen Flat And Erect (Final result)  Result time 07/02/18 14:37:52    Final result by Jose L Nelson DO (07/02/18 14:37:52)                 Impression:      Please see above      Electronically signed by: Jose L Nelson DO  Date:    07/02/2018  Time:    14:37             Narrative:    EXAMINATION:  XR ABDOMEN FLAT AND ERECT    CLINICAL HISTORY:  eval for obstruction;    TECHNIQUE:  Flat and erect AP views of the abdomen were performed.    COMPARISON:  None    FINDINGS:  Few scattered gas and fecal filled loops of bowel within the abdomen and pelvis there is no significant air-fluid level or evidence for free air on upright views.  Overall nonspecific bowel gas pattern.  Operative change with L5/S1 interbody fusion with superimposed convex right curvature of the thoracolumbar spine.  Probable surgical clips left upper abdomen.  Further evaluation as warranted clinically..                                 Medical Decision Making:   Initial Assessment:   Patient is a 27 y.o. female presenting to ED with generalized abdominal pain s/p Cervical disc surgery.   Exam with mild distension, abd tenderness, decreased bowel sounds. No rebound/guarding  Plan to obtain basic labs, lactic acid, X-ray abd. will give Zofran, pain meds, IV fluids.  Will continue to monitor closely and reassess.    Differential Diagnosis:   Ileus, med effect from opiates, constipation, dehydration  ED Management:  3:18 PM  labs reviewed unremarkable.  X-rays without obstructive gas pattern or ileus, appears to be constipation.  will give Methylnaltrexone after discussion.  Patient is stable for discharge at this time.  Diet instructions, OTC meds recommended, hydration, Zofran Rx as needed for nausea/vomiting.    Clinical impression  and plan discussed with patient.    Pt is to call for follow up with PCP in 7 days.  Pt to return to the ED for any new or concerning symptoms.  Aftercare instructions and return precautions provided to patient.   Pt expressed understanding and agrees with plan.                        Clinical Impression:     1. Abdominal pain, generalized    2. Constipation, unspecified constipation type               I, Dr. Jose L Mcbride, personally performed the services described in this documentation.   All medical record entries made by the scribe were at my direction and in my presence.   I have reviewed the chart and agree that the record is accurate and complete.   Jose L Mcbride MD.                       Jose L Mcbride MD  07/02/18 0299

## 2018-07-02 NOTE — TELEPHONE ENCOUNTER
Reason for Disposition   [1] Vomiting AND [2] abdomen looks much more swollen than usual    Protocols used: [facility] - FEMALE-A-    Patient called and complained of nausea and vomiting and having a very distended abdomen. The zofran is not helping the nausea. Patient has not had a bowel movement in 5 days. Patient advised to go to the ED because she needed to be evaluated. She verbalized understanding.

## 2018-07-02 NOTE — ED NOTES
APPEARANCE: Alert, oriented and in no acute distress.  CARDIAC: Normal rate and rhythm, no murmur heard.   PERIPHERAL VASCULAR: peripheral pulses present. Normal cap refill. No edema. Warm to touch.    RESPIRATORY:Normal rate and effort, breath sounds clear bilaterally throughout chest. Respirations are equal and unlabored no obvious signs of distress.    MUSC: Full ROM. No bony tenderness or soft tissue tenderness. No obvious deformity.  SKIN: Skin is warm and dry, normal skin turgor, mucous membranes moist.  NEURO: 5/5 strength major flexors/extensors bilaterally. Sensory intact to light touch bilaterally. Vishnu coma scale: eyes open spontaneously-4, oriented & converses-5, obeys commands-6. No neurological abnormalities.   MENTAL STATUS: awake, alert and aware of environment.  EYE: PERRL, both eyes: pupils brisk and reactive to light. Normal size.  ENT: EARS: no obvious drainage. NOSE: no active bleeding.

## 2018-07-03 NOTE — TELEPHONE ENCOUNTER
"    Reason for Disposition   [1] Constant abdominal pain AND [2] present > 2 hours    Answer Assessment - Initial Assessment Questions  1. STOOL PATTERN OR FREQUENCY: "How often do you pass bowel movements (BMs)?"  (Normal range: tid to q 3 days)  "When was the last BM passed?"        Every other day, 3-4 times weekly  2. STRAINING: "Do you have to strain to have a BM?"       never  3. RECTAL PAIN: "Does your rectum hurt when the stool comes out?" If so, ask: "Do you have hemorrhoids? How bad is the pain?"  (Scale 1-10; or mild, moderate, severe)      no  4. STOOL COMPOSITION: "Are the stools hard?"       No stools produced since last tuesday  5. BLOOD ON STOOLS: "Has there been any blood on the toilet tissue or on the surface of the BM?" If so, ask: "When was the last time?"       No stools produced since last tuesday  6. CHRONIC CONSTIPATION: "Is this a new problem for you?"  If no, ask: How long have you had this problem?" (days, weeks, months)       no  7. CHANGES IN DIET: "Have there been any recent changes in your diet?"       no  8. MEDICATIONS: "Have you been taking any new medications?"      Pain medication Percocet for post op pain, no other new medication  9. LAXATIVES: "Have you been using any laxatives or enemas?"  If yes, ask "What, how often, and when was the last time?"      Dulcolax, and mirilax, and colace, methylnaltraxone injection   10. CAUSE: "What do you think is causing the constipation?"         Anesthesia or pain medication  11. OTHER SYMPTOMS: "Do you have any other symptoms?" (e.g., abdominal pain, fever, vomiting)        5-6/10 abdominal pain dull pain with sharp pain.  Nausea, dizziness  12. PREGNANCY: "Is there any chance you are pregnant?" "When was your last menstrual period?"        No, 6/22/18    Pt with poor po intake, and has not tried the milk of magnesia, as suggested in the ER.    Protocols used: [facility]-      "

## 2018-07-11 ENCOUNTER — OFFICE VISIT (OUTPATIENT)
Dept: ORTHOPEDICS | Facility: CLINIC | Age: 28
End: 2018-07-11
Payer: COMMERCIAL

## 2018-07-11 VITALS
HEIGHT: 64 IN | BODY MASS INDEX: 24.07 KG/M2 | WEIGHT: 141 LBS | DIASTOLIC BLOOD PRESSURE: 57 MMHG | HEART RATE: 71 BPM | SYSTOLIC BLOOD PRESSURE: 99 MMHG

## 2018-07-11 DIAGNOSIS — Z98.890 HISTORY OF CERVICAL SPINAL SURGERY: Primary | ICD-10-CM

## 2018-07-11 DIAGNOSIS — Z98.1 S/P CERVICAL SPINAL FUSION: ICD-10-CM

## 2018-07-11 PROCEDURE — 99024 POSTOP FOLLOW-UP VISIT: CPT | Mod: ,,, | Performed by: ORTHOPAEDIC SURGERY

## 2018-07-11 PROCEDURE — 72040 X-RAY EXAM NECK SPINE 2-3 VW: CPT | Mod: ,,, | Performed by: ORTHOPAEDIC SURGERY

## 2018-07-11 RX ORDER — HYDROCODONE BITARTRATE AND ACETAMINOPHEN 7.5; 325 MG/1; MG/1
1 TABLET ORAL EVERY 6 HOURS PRN
Qty: 30 TABLET | Refills: 0 | Status: SHIPPED | OUTPATIENT
Start: 2018-07-11 | End: 2018-08-15 | Stop reason: SDUPTHER

## 2018-07-11 RX ORDER — METHOCARBAMOL 500 MG/1
500 TABLET, FILM COATED ORAL 2 TIMES DAILY
Qty: 30 TABLET | Refills: 1 | Status: SHIPPED | OUTPATIENT
Start: 2018-07-11 | End: 2018-08-15

## 2018-07-11 NOTE — PROGRESS NOTES
Subjective:    Patient ID: Osman Rm is a 27 y.o. female.    Chief Complaint: Post-op Evaluation of the Neck ( C4-5 Arthroplasty, Discectomy, Decompression 6-. She has a lot of soreness. She does not know if she has sutures. Sometimes trouble breathing and swallowing.)      History of Present Illness  Status post cervical discectomy and arthroplasty she is doing well.    Current Medications  Current Outpatient Prescriptions   Medication Sig Dispense Refill    ALBUTEROL SULFATE (PROAIR HFA INHL) Inhale into the lungs.      ondansetron (ZOFRAN) 4 MG tablet Take 8 mg by mouth 2 (two) times daily.      oxyCODONE-acetaminophen (PERCOCET)  mg per tablet Take 1 tablet by mouth every 6 (six) hours as needed for Pain.  0     No current facility-administered medications for this visit.        Allergies  Review of patient's allergies indicates:   Allergen Reactions    Adhesive tape-silicones Hives       Past Medical History  Past Medical History:   Diagnosis Date    Asthma        Surgical History  Past Surgical History:   Procedure Laterality Date    ANTERIOR CERVICAL DISCECTOMY W/ FUSION N/A 6/28/2018    Procedure: DISCECTOMY, SPINE, CERVICAL, ANTERIOR APPROACH, ARTHROPLASTY C4-5;  Surgeon: Andrew Brennan MD;  Location: [facility];  Service: Orthopedics;  Laterality: N/A;    SPINAL FUSION      TONSILLECTOMY         Family History:   History reviewed. No pertinent family history.    Social History:   Social History     Social History    Marital status:      Spouse name: N/A    Number of children: N/A    Years of education: N/A     Occupational History    Not on file.     Social History Main Topics    Smoking status: Never Smoker    Smokeless tobacco: Never Used    Alcohol use No    Drug use: No    Sexual activity: Yes     Partners: Male     Birth control/ protection: Abstinence     Other Topics Concern    Not on file     Social History Narrative    No narrative on file       Date  of surgery:    Review of Systems     General/Constitutional: Chills denies. Fatigue denies. Fever denies. Weight gain denies. Weight loss denies.    Musculoskeletal: Comments: See HPI for details    Skin: Rash denies.    Objective:   Vital Signs:   Vitals:    07/11/18 1414   BP: (!) 99/57   Pulse: 71        Physical Exam    This a well-developed, well nourished patient in no acute distress.  They are alert and oriented and cooperative to examination.  Pt. walks without an antalgic gait.      General Examination:     Constitutional: The patient is alert and oriented to lace person and time. Mood is pleasant.     Head/Face: Normal facial features normal eyebrows    Eyes: Normal extraocular motion bilaterally    Lungs: Respirations are equal and unlabored    Gait is coordinated.    Cardiovascular: There are no swelling or varicosities present.    Lymphatic: Negative for adenopathy    Skin: Normal    Neurological: Level of consciousness normal. Oriented to place person and time and situation    Psychiatric: Oriented to time place person and situation    Physical exam cervical spine shows anterior cervical incision well-healed nontender. No spasm noted range of motion slightly limited due to pain nerve asked status intact  XRAY Report/ Interpretation: AP and lateral x-rays of the cervical spine were performed today. Indications postoperative cervical spine surgery. Findings: Cervical disc arthroplasty C4-5 level in good position      Assessment:       1. History of cervical spinal surgery    2. S/P cervical spinal fusion        Plan:       Osman was seen today for post-op evaluation.    Diagnoses and all orders for this visit:    History of cervical spinal surgery    S/P cervical spinal fusion  -     X-Ray Cervical Spine AP And Lateral         No Follow-up on file.    Sedentary activity as tolerated advised. Return in 6 weeks with repeat x-rays cervical spine      This note was created using Dragon voice recognition  software that occasionally misinterpreted phrases or words.

## 2018-07-19 ENCOUNTER — TELEPHONE (OUTPATIENT)
Dept: ORTHOPEDICS | Facility: CLINIC | Age: 28
End: 2018-07-19

## 2018-07-19 NOTE — TELEPHONE ENCOUNTER
Spoke with the patient. This was present prior to surgery. The numbness is off and on, sometimes worse than others. Patient was offered a medrol dose jennifer/trial of gabapentin. We will try medrol dose jennifer first. If no relief, will start low dose of gabapentin. Called in to kelley at [phone]    ----- Message from Shruthi Davis sent at 7/18/2018  3:35 PM CDT -----  Patient called she is now having numbness on her left side [phone]

## 2018-07-27 ENCOUNTER — TELEPHONE (OUTPATIENT)
Dept: ORTHOPEDICS | Facility: CLINIC | Age: 28
End: 2018-07-27

## 2018-07-27 NOTE — TELEPHONE ENCOUNTER
Spoke with the patient.     ----- Message from Dorina Haji sent at 7/27/2018 10:15 AM CDT -----  Please call in regards to family medical leave and medication.... steriod pack.  Please call at [phone]

## 2018-08-01 ENCOUNTER — OFFICE VISIT (OUTPATIENT)
Dept: ORTHOPEDICS | Facility: CLINIC | Age: 28
End: 2018-08-01
Payer: COMMERCIAL

## 2018-08-01 VITALS
HEIGHT: 64 IN | DIASTOLIC BLOOD PRESSURE: 60 MMHG | WEIGHT: 140 LBS | BODY MASS INDEX: 23.9 KG/M2 | SYSTOLIC BLOOD PRESSURE: 100 MMHG

## 2018-08-01 DIAGNOSIS — Z98.890 HISTORY OF CERVICAL SPINAL SURGERY: Primary | ICD-10-CM

## 2018-08-01 DIAGNOSIS — M50.121 CERVICAL DISC DISORDER AT C4-C5 LEVEL WITH RADICULOPATHY: ICD-10-CM

## 2018-08-01 DIAGNOSIS — M54.2 PAIN OF CERVICAL FACET JOINT: ICD-10-CM

## 2018-08-01 DIAGNOSIS — M54.2 CERVICAL PAIN (NECK): ICD-10-CM

## 2018-08-01 PROCEDURE — 99024 POSTOP FOLLOW-UP VISIT: CPT | Mod: ,,, | Performed by: ORTHOPAEDIC SURGERY

## 2018-08-01 PROCEDURE — 72040 X-RAY EXAM NECK SPINE 2-3 VW: CPT | Mod: ,,, | Performed by: ORTHOPAEDIC SURGERY

## 2018-08-01 RX ORDER — GABAPENTIN 300 MG/1
CAPSULE ORAL
Qty: 30 CAPSULE | Refills: 2 | Status: SHIPPED | OUTPATIENT
Start: 2018-08-01 | End: 2018-09-12

## 2018-08-01 RX ORDER — ESCITALOPRAM OXALATE 10 MG/1
TABLET ORAL
Refills: 0 | COMMUNITY
Start: 2018-07-19 | End: 2018-10-29

## 2018-08-01 RX ORDER — FERROUS SULFATE 325(65) MG
TABLET ORAL
Refills: 11 | COMMUNITY
Start: 2018-07-19 | End: 2019-07-30

## 2018-08-01 RX ORDER — OXAPROZIN 600 MG/1
600 TABLET, FILM COATED ORAL 2 TIMES DAILY
Qty: 60 TABLET | Refills: 0 | Status: SHIPPED | OUTPATIENT
Start: 2018-08-01 | End: 2018-08-31

## 2018-08-01 NOTE — PROGRESS NOTES
Subjective:    Patient ID: Osman Rm is a 27 y.o. female.    Chief Complaint: Post-op Evaluation of the Neck (ATTY  referral having nec kpain with  left side numbness/tingling/pain to the elbow s/p C4-5 Arthroplasty/Discectomy 6-28-18. She does have headaches. No other treatment)      History of Present Illness  Onset symptoms approx 2 weeks ago. No trauma.tried medrol dose jennifer but stopped due to nausae.movement painful of neck    Current Medications  Current Outpatient Prescriptions   Medication Sig Dispense Refill    ALBUTEROL SULFATE (PROAIR HFA INHL) Inhale into the lungs.      escitalopram oxalate (LEXAPRO) 10 MG tablet TK 1 T PO D  0    ferrous sulfate 325 mg (65 mg iron) Tab tablet TK 1 T PO D WITH BREAKFAST  11    HYDROcodone-acetaminophen (NORCO) 7.5-325 mg per tablet Take 1 tablet by mouth every 6 (six) hours as needed for Pain. 30 tablet 0    methocarbamol (ROBAXIN) 500 MG Tab Take 1 tablet (500 mg total) by mouth 2 (two) times daily. 30 tablet 1    gabapentin (NEURONTIN) 300 MG capsule Take 1 tablet every night x 7 days. Increase to twice a day x 7 days. Increase to three times a day. 30 capsule 2    ondansetron (ZOFRAN) 4 MG tablet Take 8 mg by mouth 2 (two) times daily.      oxaprozin (DAYPRO) 600 mg tablet Take 1 tablet (600 mg total) by mouth 2 (two) times daily. 60 tablet 0     No current facility-administered medications for this visit.        Allergies  Review of patient's allergies indicates:   Allergen Reactions    Adhesive tape-silicones Hives       Past Medical History  Past Medical History:   Diagnosis Date    Asthma        Surgical History  Past Surgical History:   Procedure Laterality Date    ANTERIOR CERVICAL DISCECTOMY W/ FUSION N/A 6/28/2018    Procedure: DISCECTOMY, SPINE, CERVICAL, ANTERIOR APPROACH, ARTHROPLASTY C4-5;  Surgeon: Andrew Brennan MD;  Location: [facility];  Service: Orthopedics;  Laterality: N/A;    SPINAL FUSION      TONSILLECTOMY         Family  History:   History reviewed. No pertinent family history.    Social History:   Social History     Social History    Marital status:      Spouse name: N/A    Number of children: N/A    Years of education: N/A     Occupational History    Not on file.     Social History Main Topics    Smoking status: Never Smoker    Smokeless tobacco: Never Used    Alcohol use No    Drug use: No    Sexual activity: Yes     Partners: Male     Birth control/ protection: Abstinence     Other Topics Concern    Not on file     Social History Narrative    No narrative on file       Date of surgery:    Review of Systems     General/Constitutional: Chills denies. Fatigue denies. Fever denies. Weight gain denies. Weight loss denies.    Musculoskeletal: Comments: See HPI for details    Skin: Rash denies.    Objective:   Vital Signs:   Vitals:    08/01/18 1245   BP: 100/60        Physical Exam    This a well-developed, well nourished patient in no acute distress.  They are alert and oriented and cooperative to examination.  Pt. walks without an antalgic gait.      General Examination:     Constitutional: The patient is alert and oriented to lace person and time. Mood is pleasant.     Head/Face: Normal facial features normal eyebrows    Eyes: Normal extraocular motion bilaterally    Lungs: Respirations are equal and unlabored    Gait is coordinated.    Cardiovascular: There are no swelling or varicosities present.    Lymphatic: Negative for adenopathy    Skin: Normal    Neurological: Level of consciousness normal. Oriented to place person and time and situation    Psychiatric: Oriented to time place person and situation    Cervical incision is well-healed and nontender but posterior paraspinous muscles are moderately tender without spasm. Range of motion is limited  XRAY Report/ Interpretation: AP and lateral x-rays of the cervical spine were performed today. Indications neck pain. Findings:cervical disc arthroplasty C4-5 level  in good position with no signs of loosening      Assessment:       1. History of cervical spinal surgery    2. Cervical pain (neck)    3. Cervical disc disorder at C4-C5 level with radiculopathy    4. Pain of cervical facet joint        Plan:       Osman was seen today for post-op evaluation.    Diagnoses and all orders for this visit:    History of cervical spinal surgery  -     gabapentin (NEURONTIN) 300 MG capsule; Take 1 tablet every night x 7 days. Increase to twice a day x 7 days. Increase to three times a day.  -     oxaprozin (DAYPRO) 600 mg tablet; Take 1 tablet (600 mg total) by mouth 2 (two) times daily.    Cervical pain (neck)  -     X-Ray Cervical Spine AP And Lateral    Cervical disc disorder at C4-C5 level with radiculopathy    Pain of cervical facet joint         No Follow-up on file.    More likely do not her pain is on the basis of facet joint mediated inflammation secondary to surgery. We will try her on a course of medication if unsuccessful we will proceed to C4-5 medial branch blocks this treatment regimen has been agree with the patient      This note was created using Dragon voice recognition software that occasionally misinterpreted phrases or words.

## 2018-08-15 ENCOUNTER — OFFICE VISIT (OUTPATIENT)
Dept: ORTHOPEDICS | Facility: CLINIC | Age: 28
End: 2018-08-15
Payer: COMMERCIAL

## 2018-08-15 VITALS
WEIGHT: 144 LBS | DIASTOLIC BLOOD PRESSURE: 60 MMHG | HEIGHT: 64 IN | BODY MASS INDEX: 24.59 KG/M2 | SYSTOLIC BLOOD PRESSURE: 100 MMHG

## 2018-08-15 DIAGNOSIS — M54.2 PAIN OF CERVICAL FACET JOINT: ICD-10-CM

## 2018-08-15 DIAGNOSIS — M54.2 CERVICAL PAIN (NECK): Primary | ICD-10-CM

## 2018-08-15 DIAGNOSIS — Z98.890 HISTORY OF CERVICAL SPINAL SURGERY: ICD-10-CM

## 2018-08-15 DIAGNOSIS — M50.121 CERVICAL DISC DISORDER AT C4-C5 LEVEL WITH RADICULOPATHY: ICD-10-CM

## 2018-08-15 DIAGNOSIS — M54.2 CERVICAL PAIN (NECK): ICD-10-CM

## 2018-08-15 PROCEDURE — 99024 POSTOP FOLLOW-UP VISIT: CPT | Mod: ,,, | Performed by: ORTHOPAEDIC SURGERY

## 2018-08-15 RX ORDER — HYDROCODONE BITARTRATE AND ACETAMINOPHEN 7.5; 325 MG/1; MG/1
1 TABLET ORAL EVERY 6 HOURS PRN
Qty: 28 TABLET | Refills: 0 | Status: SHIPPED | OUTPATIENT
Start: 2018-08-15 | End: 2018-08-22

## 2018-08-15 RX ORDER — TIZANIDINE 4 MG/1
4 TABLET ORAL EVERY 8 HOURS
Qty: 90 TABLET | Refills: 1 | Status: SHIPPED | OUTPATIENT
Start: 2018-08-15 | End: 2018-08-15 | Stop reason: SDUPTHER

## 2018-08-15 NOTE — PROGRESS NOTES
Subjective:       Patient ID: Osman Rm is a 28 y.o. female.    Chief Complaint: Pain of the Neck ( referral. Neck pain is getting better but the left shoulder is still hurting. Medication given is not helping)      History of Present Illness  patient is here to follow-up for persistent neck pain and left parascapular pain. She is status post a C4-5 cervical disc arthroplasty on 06/28/2018. she was here about 2 weeks ago with this exacerbation of pain and given medications. She states that the medications have not helped well. Dr. Brennan's note states that if her pain persisted at today's follow-up that he would recommend bilateral C4-5 medial branch blocks with progression toward a rhizotomy. the patient is frustrated by her ongoing symptomology and has some significant concerns regarding her work status. She has been told by her employer that she must be able to return to work without restrictions in 6 weeks or less; otherwise they will have to hire someone to take her place and she will return lose her position. she also struggles to take narcotics secondary to significant constipation    Current Medications   Current Outpatient Medications   Medication Sig Dispense Refill    ALBUTEROL SULFATE (PROAIR HFA INHL) Inhale into the lungs.      escitalopram oxalate (LEXAPRO) 10 MG tablet TK 1 T PO D  0    ferrous sulfate 325 mg (65 mg iron) Tab tablet TK 1 T PO D WITH BREAKFAST  11    gabapentin (NEURONTIN) 300 MG capsule Take 1 tablet every night x 7 days. Increase to twice a day x 7 days. Increase to three times a day. 30 capsule 2    HYDROcodone-acetaminophen (NORCO) 7.5-325 mg per tablet Take 1 tablet by mouth every 6 (six) hours as needed for Pain. 30 tablet 0    methocarbamol (ROBAXIN) 500 MG Tab Take 1 tablet (500 mg total) by mouth 2 (two) times daily. 30 tablet 1    ondansetron (ZOFRAN) 4 MG tablet Take 8 mg by mouth 2 (two) times daily.      oxaprozin (DAYPRO) 600 mg tablet Take 1 tablet  (600 mg total) by mouth 2 (two) times daily. 60 tablet 0     No current facility-administered medications for this visit.        Allergies  Review of patient's allergies indicates:   Allergen Reactions    Adhesive tape-silicones Hives       Past Medical History  Past Medical History:   Diagnosis Date    Asthma        Surgical History  Past Surgical History:   Procedure Laterality Date    SPINAL FUSION      TONSILLECTOMY         Family History:   History reviewed. No pertinent family history.    Social History:   Social History     Socioeconomic History    Marital status:      Spouse name: Not on file    Number of children: Not on file    Years of education: Not on file    Highest education level: Not on file   Social Needs    Financial resource strain: Not on file    Food insecurity - worry: Not on file    Food insecurity - inability: Not on file    Transportation needs - medical: Not on file    Transportation needs - non-medical: Not on file   Occupational History    Not on file   Tobacco Use    Smoking status: Never Smoker    Smokeless tobacco: Never Used   Substance and Sexual Activity    Alcohol use: No    Drug use: No    Sexual activity: Yes     Partners: Male     Birth control/protection: Abstinence   Other Topics Concern    Not on file   Social History Narrative    Not on file       Hospitalization/Major Diagnostic Procedure:     Review of Systems     General/Constitutional:  Chills denies. Fatigue denies. Fever denies. Weight gain denies. Weight loss denies.    Respiratory:  Shortness of breath denies.    Cardiovascular:  Chest pain denies.    Gastrointestinal:  Constipation denies. Diarrhea denies. Nausea denies. Vomiting denies.     Hematology:  Easy bruising denies. Prolonged bleeding denies.     Genitourinary:  Frequent urination denies. Pain in lower back denies. Painful urination denies.     Musculoskeletal:  See HPI for details    Skin:  Rash denies.    Neurologic:   Dizziness denies. Gait abnormalities denies. Seizures denies. Tingling/Numbess denies.    Psychiatric:  Anxiety denies. Depressed mood denies.     Objective:   Vital Signs:   Vitals:    08/15/18 1349   BP: 100/60        Physical Exam      General Examination:     Constitutional: The patient is alert and oriented to lace person and time. Mood is pleasant.     Head/Face: Normal facial features normal eyebrows    Eyes: Normal extraocular motion bilaterally    Lungs: Respirations are equal and unlabored    Gait is coordinated.    Cardiovascular: There are no swelling or varicosities present.    Lymphatic: Negative for adenopathy    Skin: Normal    Neurological: Level of consciousness normal. Oriented to place person and time and situation    Psychiatric: Oriented to time place person and situation    Physical exam cervical spine shows anterior cervical incision well-healed nontender. spasm noted left paraspinous muscles. Range of motion slightly limited due to pain. bilateral upper extremities are distal neurovascular intact.    XRAY Report/ Interpretation: no new studies today      Assessment:       1. Cervical pain (neck)    2. Pain of cervical facet joint    3. Cervical disc disorder at C4-C5 level with radiculopathy    4. History of cervical spinal surgery        Plan:       Osman was seen today for pain.    Diagnoses and all orders for this visit:    Cervical pain (neck)    Pain of cervical facet joint    Cervical disc disorder at C4-C5 level with radiculopathy    History of cervical spinal surgery         No Follow-up on file.    at this time we recommend proceeding with left C3-4 and C4-5 medial branch blocks with progression toward a rhizotomy. She is previously seen Dr. Bob Castillo for this and we will refer her back to him. She was given a sample of Movantik. she was also given a prescription for Zanaflex.    This note was created using Dragon voice recognition software that occasionally misinterpreted  phrases or words.

## 2018-08-16 RX ORDER — TIZANIDINE 4 MG/1
TABLET ORAL
Qty: 270 TABLET | Refills: 1 | Status: SHIPPED | OUTPATIENT
Start: 2018-08-16 | End: 2018-09-12

## 2018-09-07 ENCOUNTER — TELEPHONE (OUTPATIENT)
Dept: ORTHOPEDICS | Facility: CLINIC | Age: 28
End: 2018-09-07

## 2018-09-07 NOTE — TELEPHONE ENCOUNTER
----- Message from Shruthi Davis sent at 9/6/2018 11:15 AM CDT -----  (dr oakes) patient is checking on the status of her disabiltiy papers that the insurance company faxed over 2 weeks ago [phone]

## 2018-09-12 ENCOUNTER — OFFICE VISIT (OUTPATIENT)
Dept: ORTHOPEDICS | Facility: CLINIC | Age: 28
End: 2018-09-12
Payer: COMMERCIAL

## 2018-09-12 VITALS
DIASTOLIC BLOOD PRESSURE: 70 MMHG | WEIGHT: 145 LBS | SYSTOLIC BLOOD PRESSURE: 118 MMHG | BODY MASS INDEX: 24.75 KG/M2 | HEIGHT: 64 IN

## 2018-09-12 DIAGNOSIS — M50.121 CERVICAL DISC DISORDER AT C4-C5 LEVEL WITH RADICULOPATHY: Primary | ICD-10-CM

## 2018-09-12 DIAGNOSIS — Z98.890 HISTORY OF CERVICAL SPINAL SURGERY: ICD-10-CM

## 2018-09-12 PROCEDURE — 99024 POSTOP FOLLOW-UP VISIT: CPT | Mod: ,,, | Performed by: ORTHOPAEDIC SURGERY

## 2018-09-12 RX ORDER — GABAPENTIN 300 MG/1
300 CAPSULE ORAL 2 TIMES DAILY
Qty: 60 CAPSULE | Refills: 2 | Status: SHIPPED | OUTPATIENT
Start: 2018-09-12 | End: 2018-10-29 | Stop reason: SDUPTHER

## 2018-09-12 RX ORDER — HYDROCODONE BITARTRATE AND ACETAMINOPHEN 7.5; 325 MG/1; MG/1
1 TABLET ORAL EVERY 6 HOURS PRN
COMMUNITY
End: 2019-05-01

## 2018-09-12 NOTE — PROGRESS NOTES
Subjective:       Patient ID: Osman Rm is a 28 y.o. female.    Chief Complaint: Pain of the Neck ( referral. Neck pain is a lot better. She saw Dr Castillo yesterday. She had injections a week ago and that seemed to really help.Pain radiates to the left arm to elbow.)      History of Present Illness  Patient is status post a C4-5 cervical disc arthroplasty on 06/28/2018. She had an exacerbation of symptomology and was referred for cervical medial branch blocks which was done and did reduce her symptoms. The pain management specialist has recommended the rhizotomy if her pain returns. The patient chief complaint at this point remain some incisional tenderness and pain; she does have a history of keloids.    Current Medications  Current Outpatient Medications   Medication Sig Dispense Refill    ALBUTEROL SULFATE (PROAIR HFA INHL) Inhale into the lungs.      escitalopram oxalate (LEXAPRO) 10 MG tablet TK 1 T PO D  0    ferrous sulfate 325 mg (65 mg iron) Tab tablet TK 1 T PO D WITH BREAKFAST  11    HYDROcodone-acetaminophen (NORCO) 7.5-325 mg per tablet Take 1 tablet by mouth every 6 (six) hours as needed for Pain.       No current facility-administered medications for this visit.        Allergies  Review of patient's allergies indicates:   Allergen Reactions    Adhesive tape-silicones Hives       Past Medical History  Past Medical History:   Diagnosis Date    Asthma        Surgical History  Past Surgical History:   Procedure Laterality Date    ANTERIOR CERVICAL DISCECTOMY W/ FUSION N/A 6/28/2018    Procedure: DISCECTOMY, SPINE, CERVICAL, ANTERIOR APPROACH, ARTHROPLASTY C4-5;  Surgeon: Andrew Brennan MD;  Location: [facility] OR;  Service: Orthopedics;  Laterality: N/A;    DECOMPRESSION N/A 6/28/2018    Performed by Andrew Brennan MD at [facility] OR    DISCECTOMY, SPINE, CERVICAL, ANTERIOR APPROACH, ARTHROPLASTY C4-5 N/A 6/28/2018    Performed by Andrew [facility] MD at [facility] OR    SPINAL FUSION       TONSILLECTOMY         Family History:   History reviewed. No pertinent family history.    Social History:   Social History     Socioeconomic History    Marital status:      Spouse name: Not on file    Number of children: Not on file    Years of education: Not on file    Highest education level: Not on file   Social Needs    Financial resource strain: Not on file    Food insecurity - worry: Not on file    Food insecurity - inability: Not on file    Transportation needs - medical: Not on file    Transportation needs - non-medical: Not on file   Occupational History    Not on file   Tobacco Use    Smoking status: Never Smoker    Smokeless tobacco: Never Used   Substance and Sexual Activity    Alcohol use: No    Drug use: No    Sexual activity: Yes     Partners: Male     Birth control/protection: Abstinence   Other Topics Concern    Not on file   Social History Narrative    Not on file       Hospitalization/Major Diagnostic Procedure:     Review of Systems     General/Constitutional:  Chills denies. Fatigue denies. Fever denies. Weight gain denies. Weight loss denies.    Respiratory:  Shortness of breath denies.    Cardiovascular:  Chest pain denies.    Gastrointestinal:  Constipation denies. Diarrhea denies. Nausea denies. Vomiting denies.     Hematology:  Easy bruising denies. Prolonged bleeding denies.     Genitourinary:  Frequent urination denies. Pain in lower back denies. Painful urination denies.     Musculoskeletal:  See HPI for details    Skin:  Rash denies.    Neurologic:  Dizziness denies. Gait abnormalities denies. Seizures denies. Tingling/Numbess denies.    Psychiatric:  Anxiety denies. Depressed mood denies.     Objective:   Vital Signs:   Vitals:    09/12/18 1121   BP: 118/70        Physical Exam      General Examination:     Constitutional: The patient is alert and oriented to lace person and time. Mood is pleasant.     Head/Face: Normal facial features normal eyebrows    Eyes:  Normal extraocular motion bilaterally    Lungs: Respirations are equal and unlabored    Gait is coordinated.    Cardiovascular: There are no swelling or varicosities present.    Lymphatic: Negative for adenopathy    Skin: Normal    Neurological: Level of consciousness normal. Oriented to place person and time and situation    Psychiatric: Oriented to time place person and situation    Cervical exam: Anterior cervical incision remains well-healed; there is some moderate to significant tenderness palpation over the incision itself but only a minimal amount of subcutaneous scarring is palpated. Cervical range of motion approximately 80% of normal in all planes mobility. Bilateral upper extremities demonstrate full active range of motion, equal and symmetric DTRs, and normal strength.    XRAY Report/ Interpretation: No new studies today      Assessment:       1. Cervical disc disorder at C4-C5 level with radiculopathy s/p C4-5 Cervical spine disectomy, arthroplasty and decompression        Plan:       Osman was seen today for pain.    Diagnoses and all orders for this visit:    Cervical disc disorder at C4-C5 level with radiculopathy s/p C4-5 Cervical spine disectomy, arthroplasty and decompression         No Follow-up on file.    Patient states that she feels that she is ready to return to work within the next couple of weeks. I will document that she can return to work on 09/24/2018 without restrictions unless there is an exacerbation of her symptomology again. If there is such an occurrence then we would like to see her back as soon as possible. More than likely we would recommend that she proceed with the cervical facet rhizotomy procedure    This note was created using Dragon voice recognition software that occasionally misinterpreted phrases or words.

## 2018-09-12 NOTE — LETTER
September 12, 2018      [facility]  [address]  [place] [address]  Phone: [phone]  Fax: [phone]       Patient: Osman Rm   YOB: 1990  Date of Visit: 09/12/2018    To Whom It May Concern:    Janie Rm  was at our office on 09/12/2018. She may return to work on 9/24/18 with no restrictions. If you have any questions or concerns, or if I can be of further assistance, please do not hesitate to contact me.    Sincerely,    Ritesh Fuentes PA-C

## 2018-10-29 ENCOUNTER — OFFICE VISIT (OUTPATIENT)
Dept: ORTHOPEDICS | Facility: CLINIC | Age: 28
End: 2018-10-29
Payer: COMMERCIAL

## 2018-10-29 VITALS
WEIGHT: 147 LBS | BODY MASS INDEX: 25.1 KG/M2 | DIASTOLIC BLOOD PRESSURE: 76 MMHG | HEIGHT: 64 IN | HEART RATE: 74 BPM | SYSTOLIC BLOOD PRESSURE: 116 MMHG

## 2018-10-29 DIAGNOSIS — M50.121 CERVICAL DISC DISORDER AT C4-C5 LEVEL WITH RADICULOPATHY: Primary | ICD-10-CM

## 2018-10-29 DIAGNOSIS — Z98.890 HISTORY OF CERVICAL SPINAL SURGERY: ICD-10-CM

## 2018-10-29 DIAGNOSIS — M54.2 PAIN OF CERVICAL FACET JOINT: ICD-10-CM

## 2018-10-29 PROCEDURE — 99213 OFFICE O/P EST LOW 20 MIN: CPT | Mod: ,,, | Performed by: ORTHOPAEDIC SURGERY

## 2018-10-29 RX ORDER — TIZANIDINE 4 MG/1
TABLET ORAL
Refills: 1 | COMMUNITY
Start: 2018-10-23 | End: 2018-10-29 | Stop reason: SDUPTHER

## 2018-10-29 RX ORDER — GABAPENTIN 300 MG/1
300 CAPSULE ORAL 2 TIMES DAILY
Qty: 60 CAPSULE | Refills: 2 | Status: SHIPPED | OUTPATIENT
Start: 2018-10-29 | End: 2019-05-01

## 2018-10-29 RX ORDER — TIZANIDINE 4 MG/1
TABLET ORAL
Qty: 90 TABLET | Refills: 2 | Status: SHIPPED | OUTPATIENT
Start: 2018-10-29 | End: 2019-07-30

## 2018-10-29 RX ORDER — ESCITALOPRAM OXALATE 20 MG/1
TABLET ORAL
Refills: 0 | COMMUNITY
Start: 2018-10-01 | End: 2019-07-30

## 2018-10-29 NOTE — PROGRESS NOTES
Subjective:       Patient ID: Osman Rm is a 28 y.o. female.    Chief Complaint: Pain of the Neck (ATTY Cervical MBB follow up, states the injection did help and overall her neck is doing better. States the pain is more tolerable now. States that Dr Castillo wants to proceed with the RFA.)      History of Present Illness  Status post so this arthroplasty residual posterior cervical pain.  Overall she's doing well. She has done well with the cervical medial branch blocks and her pain management specialist has recommended the radiofrequency ablation.    Current Medications  Current Outpatient Medications   Medication Sig Dispense Refill    ALBUTEROL SULFATE (PROAIR HFA INHL) Inhale into the lungs.      escitalopram oxalate (LEXAPRO) 20 MG tablet TK 1 T PO ONCE D  0    ferrous sulfate 325 mg (65 mg iron) Tab tablet TK 1 T PO D WITH BREAKFAST  11    gabapentin (NEURONTIN) 300 MG capsule Take 1 capsule (300 mg total) by mouth 2 (two) times daily. 60 capsule 2    HYDROcodone-acetaminophen (NORCO) 7.5-325 mg per tablet Take 1 tablet by mouth every 6 (six) hours as needed for Pain.      tiZANidine (ZANAFLEX) 4 MG tablet TK 1 T PO  Q 8 H 90 tablet 2     No current facility-administered medications for this visit.        Allergies  Review of patient's allergies indicates:   Allergen Reactions    Adhesive tape-silicones Hives       Past Medical History  Past Medical History:   Diagnosis Date    Asthma        Surgical History  Past Surgical History:   Procedure Laterality Date    ANTERIOR CERVICAL DISCECTOMY W/ FUSION N/A 6/28/2018    Procedure: DISCECTOMY, SPINE, CERVICAL, ANTERIOR APPROACH, ARTHROPLASTY C4-5;  Surgeon: Andrew Brennan MD;  Location: [facility] OR;  Service: Orthopedics;  Laterality: N/A;    DECOMPRESSION N/A 6/28/2018    Performed by Andrew [facility] MD at [facility] OR    DISCECTOMY, SPINE, CERVICAL, ANTERIOR APPROACH, ARTHROPLASTY C4-5 N/A 6/28/2018    Performed by Andrew Brennan MD at [facility] OR     medial branch block      SPINAL FUSION      TONSILLECTOMY         Family History:   History reviewed. No pertinent family history.    Social History:   Social History     Socioeconomic History    Marital status:      Spouse name: Not on file    Number of children: Not on file    Years of education: Not on file    Highest education level: Not on file   Social Needs    Financial resource strain: Not on file    Food insecurity - worry: Not on file    Food insecurity - inability: Not on file    Transportation needs - medical: Not on file    Transportation needs - non-medical: Not on file   Occupational History    Not on file   Tobacco Use    Smoking status: Never Smoker    Smokeless tobacco: Never Used   Substance and Sexual Activity    Alcohol use: No    Drug use: No    Sexual activity: Yes     Partners: Male     Birth control/protection: Abstinence   Other Topics Concern    Not on file   Social History Narrative    Not on file       Hospitalization/Major Diagnostic Procedure:     Review of Systems     General/Constitutional:  Chills denies. Fatigue denies. Fever denies. Weight gain denies. Weight loss denies.    Respiratory:  Shortness of breath denies.    Cardiovascular:  Chest pain denies.    Gastrointestinal:  Constipation denies. Diarrhea denies. Nausea denies. Vomiting denies.     Hematology:  Easy bruising denies. Prolonged bleeding denies.     Genitourinary:  Frequent urination denies. Pain in lower back denies. Painful urination denies.     Musculoskeletal:  See HPI for details    Skin:  Rash denies.    Neurologic:  Dizziness denies. Gait abnormalities denies. Seizures denies. Tingling/Numbess denies.    Psychiatric:  Anxiety denies. Depressed mood denies.     Objective:   Vital Signs:   Vitals:    10/29/18 1412   BP: 116/76   Pulse: 74        Physical Exam      General Examination:     Constitutional: The patient is alert and oriented to lace person and time. Mood is pleasant.  "    Head/Face: Normal facial features normal eyebrows    Eyes: Normal extraocular motion bilaterally    Lungs: Respirations are equal and unlabored    Gait is coordinated.    Cardiovascular: There are no swelling or varicosities present.    Lymphatic: Negative for adenopathy    Skin: Normal    Neurological: Level of consciousness normal. Oriented to place person and time and situation    Psychiatric: Oriented to time place person and situation  Ritesh Fuentes, physician's assistant served in the capacity as a "scribe" for this patient encounter  A "face to face" encounter occurred with Dr. Brennan on this date  The treatment plan and medical decision making is outlined below:  Cervical exam: Anterior cervical incision remains well-healed; there is some moderate to significant tenderness palpation over the incision itself but only a minimal amount of subcutaneous scarring is palpated; there is moderate keloiding.  Cervical range of motion approximately 80% of normal in all planes mobility. Bilateral upper extremities demonstrate full active range of motion, equal and symmetric DTRs, and normal strength.    XRAY Report/ Interpretation: No new studies today      Assessment:       1. Cervical disc disorder at C4-C5 level with radiculopathy s/p C4-5 Cervical spine disectomy, arthroplasty and decompression    2. Pain of cervical facet joint    3. History of cervical spinal surgery        Plan:       Osman was seen today for pain.    Diagnoses and all orders for this visit:    Cervical disc disorder at C4-C5 level with radiculopathy s/p C4-5 Cervical spine disectomy, arthroplasty and decompression  -     tiZANidine (ZANAFLEX) 4 MG tablet; TK 1 T PO  Q 8 H    Pain of cervical facet joint  -     tiZANidine (ZANAFLEX) 4 MG tablet; TK 1 T PO  Q 8 H    History of cervical spinal surgery  -     gabapentin (NEURONTIN) 300 MG capsule; Take 1 capsule (300 mg total) by mouth 2 (two) times daily.  -     tiZANidine (ZANAFLEX) 4 MG " tablet; TK 1 T PO  Q 8 H         No Follow-up on file.    Continue to increase activity as tolerated. Proceed with cervical facet rhizotomy. Follow-up proximally 6 weeks status post a rhizotomy procedure    This note was created using Dragon voice recognition software that occasionally misinterpreted phrases or words.

## 2018-11-20 ENCOUNTER — HOSPITAL ENCOUNTER (EMERGENCY)
Facility: HOSPITAL | Age: 28
Discharge: HOME | End: 2018-11-20
Attending: EMERGENCY MEDICINE
Payer: COMMERCIAL

## 2018-11-20 VITALS
DIASTOLIC BLOOD PRESSURE: 53 MMHG | SYSTOLIC BLOOD PRESSURE: 112 MMHG | TEMPERATURE: 99 F | HEART RATE: 65 BPM | HEIGHT: 64 IN | BODY MASS INDEX: 25.61 KG/M2 | OXYGEN SATURATION: 100 % | RESPIRATION RATE: 16 BRPM | WEIGHT: 150 LBS

## 2018-11-20 DIAGNOSIS — E16.2 HYPOGLYCEMIA: ICD-10-CM

## 2018-11-20 DIAGNOSIS — R11.0 NAUSEA: Primary | ICD-10-CM

## 2018-11-20 LAB
ALBUMIN SERPL BCP-MCNC: 4.5 G/DL
ALP SERPL-CCNC: 59 U/L
ALT SERPL W/O P-5'-P-CCNC: 21 U/L
ANION GAP SERPL CALC-SCNC: 9 MMOL/L
AST SERPL-CCNC: 22 U/L
B-HCG UR QL: NEGATIVE
BACTERIA #/AREA URNS HPF: NORMAL /HPF
BASOPHILS # BLD AUTO: 0.03 K/UL
BASOPHILS NFR BLD: 0.5 %
BILIRUB SERPL-MCNC: 0.8 MG/DL
BILIRUB UR QL STRIP: NEGATIVE
BUN SERPL-MCNC: 7 MG/DL
CALCIUM SERPL-MCNC: 9.9 MG/DL
CHLORIDE SERPL-SCNC: 103 MMOL/L
CLARITY UR: CLEAR
CO2 SERPL-SCNC: 24 MMOL/L
COLOR UR: YELLOW
CREAT SERPL-MCNC: 0.7 MG/DL
CTP QC/QA: YES
DIFFERENTIAL METHOD: ABNORMAL
EOSINOPHIL # BLD AUTO: 0.1 K/UL
EOSINOPHIL NFR BLD: 2.3 %
ERYTHROCYTE [DISTWIDTH] IN BLOOD BY AUTOMATED COUNT: 13.2 %
EST. GFR  (AFRICAN AMERICAN): >60 ML/MIN/1.73 M^2
EST. GFR  (NON AFRICAN AMERICAN): >60 ML/MIN/1.73 M^2
FLUAV AG SPEC QL IA: NEGATIVE
FLUBV AG SPEC QL IA: NEGATIVE
GLUCOSE SERPL-MCNC: 92 MG/DL
GLUCOSE UR QL STRIP: NEGATIVE
HCT VFR BLD AUTO: 36.8 %
HGB BLD-MCNC: 11.9 G/DL
HGB UR QL STRIP: NEGATIVE
KETONES UR QL STRIP: NEGATIVE
LEUKOCYTE ESTERASE UR QL STRIP: ABNORMAL
LIPASE SERPL-CCNC: 19 U/L
LYMPHOCYTES # BLD AUTO: 2.5 K/UL
LYMPHOCYTES NFR BLD: 43.8 %
MCH RBC QN AUTO: 30.5 PG
MCHC RBC AUTO-ENTMCNC: 32.3 G/DL
MCV RBC AUTO: 94 FL
MICROSCOPIC COMMENT: NORMAL
MONOCYTES # BLD AUTO: 0.4 K/UL
MONOCYTES NFR BLD: 6.5 %
NEUTROPHILS # BLD AUTO: 2.7 K/UL
NEUTROPHILS NFR BLD: 46.9 %
NITRITE UR QL STRIP: NEGATIVE
PH UR STRIP: 8 [PH] (ref 5–8)
PLATELET # BLD AUTO: 272 K/UL
PMV BLD AUTO: 10.6 FL
POCT GLUCOSE: 66 MG/DL (ref 70–110)
POTASSIUM SERPL-SCNC: 4 MMOL/L
PROT SERPL-MCNC: 8 G/DL
PROT UR QL STRIP: NEGATIVE
RBC # BLD AUTO: 3.9 M/UL
RBC #/AREA URNS HPF: 2 /HPF (ref 0–4)
SODIUM SERPL-SCNC: 136 MMOL/L
SP GR UR STRIP: 1.01 (ref 1–1.03)
SPECIMEN SOURCE: NORMAL
SQUAMOUS #/AREA URNS HPF: 8 /HPF
URN SPEC COLLECT METH UR: ABNORMAL
UROBILINOGEN UR STRIP-ACNC: NEGATIVE EU/DL
WBC # BLD AUTO: 5.71 K/UL
WBC #/AREA URNS HPF: 3 /HPF (ref 0–5)

## 2018-11-20 PROCEDURE — 25000003 PHARM REV CODE 250: Performed by: EMERGENCY MEDICINE

## 2018-11-20 PROCEDURE — 81025 URINE PREGNANCY TEST: CPT | Performed by: EMERGENCY MEDICINE

## 2018-11-20 PROCEDURE — 83690 ASSAY OF LIPASE: CPT

## 2018-11-20 PROCEDURE — 85025 COMPLETE CBC W/AUTO DIFF WBC: CPT

## 2018-11-20 PROCEDURE — 87400 INFLUENZA A/B EACH AG IA: CPT

## 2018-11-20 PROCEDURE — 99284 EMERGENCY DEPT VISIT MOD MDM: CPT

## 2018-11-20 PROCEDURE — 82962 GLUCOSE BLOOD TEST: CPT

## 2018-11-20 PROCEDURE — 81000 URINALYSIS NONAUTO W/SCOPE: CPT

## 2018-11-20 PROCEDURE — 80053 COMPREHEN METABOLIC PANEL: CPT

## 2018-11-20 RX ORDER — ONDANSETRON 4 MG/1
4 TABLET, ORALLY DISINTEGRATING ORAL
Status: COMPLETED | OUTPATIENT
Start: 2018-11-20 | End: 2018-11-20

## 2018-11-20 RX ORDER — ONDANSETRON 4 MG/1
4 TABLET, ORALLY DISINTEGRATING ORAL EVERY 8 HOURS PRN
Qty: 15 TABLET | Refills: 0 | Status: SHIPPED | OUTPATIENT
Start: 2018-11-20 | End: 2018-11-27

## 2018-11-20 RX ADMIN — ONDANSETRON 4 MG: 4 TABLET, ORALLY DISINTEGRATING ORAL at 08:11

## 2018-11-21 NOTE — ED NOTES
Patient states she has been having nausea and vomitting x1 day. Denies diarrhea. States she feels light headed and has a headache and also complains of having a low sugar.

## 2018-11-21 NOTE — ED PROVIDER NOTES
Encounter Date: 11/20/2018    SCRIBE #1 NOTE: ILaurel, am scribing for, and in the presence of, Dr. Mcbride .       History     Chief Complaint   Patient presents with    Weakness     PT C/O GENERALIZED WEAKNESS, NAUSEA, AND VOMITING ONSET TODAY. POC GLUCOSE OBTAINED PER PT REQUEST. DENIES HX OF DIABETES.     Time seen by provider: 7:51 PM    This is a 28 y.o. female who has a past medical history of Asthma.     The patient presents to the Emergency Department with complaint of fatigue.   She reports associated nausea and vomiting.  Symptoms began last night and are consistent what she experienced one month ago when diagnosed with sinus infection by her PCP.  Pt denies any alleviating factors.   She consulted her PCP about symptoms and was instructed to go to the ED for CT scan.   Pt denies fever, chills, blood in stool or vomitus, abdominal pain, SOB, myalgias, pain to the bilateral lower extremities, or dysuria.   The patient also mentions having mild shoulder pain since an anterior cervical discectomy with fusion that was performed five months ago. She often has constipation secondary to opioid therapy but notes this resolves with the use of Colace.         The history is provided by the patient.     Review of patient's allergies indicates:   Allergen Reactions    Adhesive tape-silicones Hives     Past Medical History:   Diagnosis Date    Asthma      Past Surgical History:   Procedure Laterality Date    ANTERIOR CERVICAL DISCECTOMY W/ FUSION N/A 6/28/2018    Procedure: DISCECTOMY, SPINE, CERVICAL, ANTERIOR APPROACH, ARTHROPLASTY C4-5;  Surgeon: Andrew Brennan MD;  Location: [facility] OR;  Service: Orthopedics;  Laterality: N/A;    DECOMPRESSION N/A 6/28/2018    Performed by Andrew [facility] MD at [facility] OR    DISCECTOMY, SPINE, CERVICAL, ANTERIOR APPROACH, ARTHROPLASTY C4-5 N/A 6/28/2018    Performed by Andrew [facility] MD at [facility] OR    medial branch block      SPINAL FUSION      TONSILLECTOMY       No  family history on file.  Social History     Tobacco Use    Smoking status: Never Smoker    Smokeless tobacco: Never Used   Substance Use Topics    Alcohol use: No    Drug use: No     Review of Systems   Constitutional: Positive for fatigue. Negative for activity change, appetite change, chills, diaphoresis and fever.   HENT: Negative for congestion, sore throat and trouble swallowing.    Eyes: Negative for photophobia and visual disturbance.   Respiratory: Negative for cough, chest tightness and shortness of breath.    Cardiovascular: Negative for chest pain.   Gastrointestinal: Positive for nausea and vomiting. Negative for abdominal pain, blood in stool and constipation.        Negative for blood in vomitus.   Endocrine: Negative for polydipsia and polyuria.   Genitourinary: Negative for difficulty urinating and flank pain.   Musculoskeletal: Negative for back pain, myalgias and neck pain.        Negative for pain to the bilateral lower extremities. Positive for mild pain to the left shoulder.   Skin: Negative for rash.   Neurological: Negative for weakness and headaches.   Psychiatric/Behavioral: Negative for confusion.       Physical Exam     Initial Vitals [11/20/18 1906]   BP Pulse Resp Temp SpO2   (!) 112/53 62 16 98.5 °F (36.9 °C) 100 %      MAP       --         Physical Exam    Nursing note and vitals reviewed.  Constitutional: She appears well-developed and well-nourished. She is not diaphoretic. No distress.   HENT:   Head: Normocephalic and atraumatic.   Eyes: EOM are normal. Pupils are equal, round, and reactive to light.   Neck: Normal range of motion.   Cardiovascular: Normal rate, regular rhythm and normal heart sounds. Exam reveals no gallop and no friction rub.    No murmur heard.  Pulmonary/Chest: Breath sounds normal. No respiratory distress. She has no wheezes. She has no rhonchi. She has no rales.   Abdominal: Soft. There is no tenderness. There is no rebound and no guarding.    Musculoskeletal: Normal range of motion. She exhibits no edema or tenderness.   Neurological: She is alert and oriented to person, place, and time.   Skin: Skin is warm and dry. No rash and no abscess noted. No erythema. No pallor.   Psychiatric: She has a normal mood and affect. Her behavior is normal. Judgment and thought content normal.         ED Course   Procedures  Labs Reviewed   CBC W/ AUTO DIFFERENTIAL - Abnormal; Notable for the following components:       Result Value    RBC 3.90 (*)     Hemoglobin 11.9 (*)     Hematocrit 36.8 (*)     All other components within normal limits   URINALYSIS, REFLEX TO URINE CULTURE - Abnormal; Notable for the following components:    Leukocytes, UA 1+ (*)     All other components within normal limits    Narrative:     Preferred Collection Type->Urine, Clean Catch   POCT GLUCOSE - Abnormal; Notable for the following components:    POCT Glucose 66 (*)     All other components within normal limits   COMPREHENSIVE METABOLIC PANEL    Narrative:     Slightly hemolyzed.   LIPASE    Narrative:     Slightly hemolyzed.   INFLUENZA A AND B ANTIGEN   URINALYSIS MICROSCOPIC    Narrative:     Preferred Collection Type->Urine, Clean Catch   POCT URINE PREGNANCY             Medical Decision Making:   Initial Assessment:   Pt presents with fatigue, nausea, and vomiting. Low blood sugar noted in triage. I will order lipase, UA, basic labs, and Zofran.  Differential Diagnosis:   Hypoglycemia   Gastroenteritis  Flu like syndrome  Dehrydration  Clinical Tests:   Lab Tests: Ordered and Reviewed            Scribe Attestation:   Scribe #1: I performed the above scribed service and the documentation accurately describes the services I performed. I attest to the accuracy of the note.    Attending Attestation:           Physician Attestation for Scribe:  Physician Attestation Statement for Scribe #1: I, Dr. Mcbride, reviewed documentation, as scribed by Laurel Martinez in my presence, and it is both  accurate and complete.         Attending ED Notes:   9:58 PM  Blood work and flu swab came back unremarkable. Because of nonspecific presentation, patient may have viral syndrome. Pt did not eat or drink anything and blood sugar taken shortly after fingerstick was WNL. I advised bland diet and will give Rx for Zofran. F/U with PCP in 1 week as needed.             Clinical Impression:     1. Nausea    2. Hypoglycemia                                   Jose L Mcbride MD  11/21/18 2056

## 2018-11-21 NOTE — DISCHARGE INSTRUCTIONS
Thank you for choosing [facility]! We appreciate you coming to us for your medical care. We hope you feel better soon! Please come back to [facility] for all of your future medical needs.      Sincerely,    Jose L Mcbride MD  Medical Director  Emergency Department

## 2019-02-04 ENCOUNTER — TELEPHONE (OUTPATIENT)
Dept: ORTHOPEDICS | Facility: CLINIC | Age: 29
End: 2019-02-04

## 2019-03-08 RX ORDER — GABAPENTIN 300 MG/1
CAPSULE ORAL
Qty: 30 CAPSULE | Refills: 2 | Status: SHIPPED | OUTPATIENT
Start: 2019-03-08 | End: 2019-05-01

## 2019-04-10 ENCOUNTER — TELEPHONE (OUTPATIENT)
Dept: ORTHOPEDICS | Facility: CLINIC | Age: 29
End: 2019-04-10

## 2019-04-10 NOTE — TELEPHONE ENCOUNTER
----- Message from Briana So sent at 4/10/2019  3:39 PM CDT -----  Contact: [employer]   Please call patient she is very dizzy and has a lot of nausea.

## 2019-04-11 ENCOUNTER — OFFICE VISIT (OUTPATIENT)
Dept: ENDOCRINOLOGY | Facility: CLINIC | Age: 29
End: 2019-04-11
Payer: COMMERCIAL

## 2019-04-11 VITALS
HEIGHT: 64 IN | WEIGHT: 163.63 LBS | BODY MASS INDEX: 27.93 KG/M2 | SYSTOLIC BLOOD PRESSURE: 92 MMHG | DIASTOLIC BLOOD PRESSURE: 60 MMHG | HEART RATE: 70 BPM

## 2019-04-11 DIAGNOSIS — R42 DIZZINESS: Primary | ICD-10-CM

## 2019-04-11 PROCEDURE — 3008F BODY MASS INDEX DOCD: CPT | Mod: CPTII,S$GLB,, | Performed by: INTERNAL MEDICINE

## 2019-04-11 PROCEDURE — 99999 PR PBB SHADOW E&M-EST. PATIENT-LVL III: CPT | Mod: PBBFAC,,, | Performed by: INTERNAL MEDICINE

## 2019-04-11 PROCEDURE — 99999 PR PBB SHADOW E&M-EST. PATIENT-LVL III: ICD-10-PCS | Mod: PBBFAC,,, | Performed by: INTERNAL MEDICINE

## 2019-04-11 PROCEDURE — 99203 PR OFFICE/OUTPT VISIT, NEW, LEVL III, 30-44 MIN: ICD-10-PCS | Mod: S$GLB,,, | Performed by: INTERNAL MEDICINE

## 2019-04-11 PROCEDURE — 3008F PR BODY MASS INDEX (BMI) DOCUMENTED: ICD-10-PCS | Mod: CPTII,S$GLB,, | Performed by: INTERNAL MEDICINE

## 2019-04-11 PROCEDURE — 99203 OFFICE O/P NEW LOW 30 MIN: CPT | Mod: S$GLB,,, | Performed by: INTERNAL MEDICINE

## 2019-04-11 NOTE — PROGRESS NOTES
"Subjective:     Patient ID: Osman Rm is a 28 y.o. female.    Chief Complaint: Consult    HPI:   Ms. Rm is a 28 y.o. female who is here for a consult visit for evaluation of low blood sugars and dizziness.   Cervical neck fusion 6/28/2018 prior to this surgery did not have symptoms.   On average has 8 days per month with symptoms.     Three weeks ago, in the morning light headedness, dizziness, nausea and "glossy vision." acute in onset at night. The systems persisted. She went sleep, avoid light, noise. The following morning, symptoms persisted. Tried treating glucose tablets and juice. Symptoms did not improve with treatment. Symptoms did not resolve at all during the day.     Symptoms persisted and had blood sugar checked by RN (works [employer]) and POCT was 40. Took juice peppermints with some improvement. She laid down, felt OK and drove to  her child. She checked her blood sugar again and it was 124.     Kept drinking juice and taking blood glucose tablets. Had POCT in [facility] ER and had BG of 90.     Referred by ophthalmologist, ENT and neuro. Seen by PCP.     At her PCP's office visit had symptoms with BG of 96.     Family history:  preDM grandmother   preDM mother   preDM uncle      Review of Systems   Constitutional: Positive for unexpected weight change. Negative for chills and fever.   HENT: Negative for congestion, postnasal drip, sinus pressure and sinus pain.         Strep throat treated with abx IM a few weeks ago     Respiratory: Negative for cough and shortness of breath.    Cardiovascular: Positive for palpitations. Negative for leg swelling.   Genitourinary: Negative for difficulty urinating, dysuria and flank pain.   Musculoskeletal: Negative for arthralgias and back pain.   Neurological: Negative for dizziness and light-headedness ( other than with episodes, no LOC).   Psychiatric/Behavioral: Positive for sleep disturbance.     Objective:     Physical Exam " "  Constitutional: She is oriented to person, place, and time. She appears well-developed and well-nourished. No distress.   HENT:   Head: Normocephalic and atraumatic.   Nose: Nose normal.   Mouth/Throat: Oropharynx is clear and moist. No oropharyngeal exudate.   Eyes: Pupils are equal, round, and reactive to light. Conjunctivae and EOM are normal. No scleral icterus.   Neck: Normal range of motion. Neck supple. No thyromegaly present.   Cardiovascular: Normal rate, regular rhythm, normal heart sounds and intact distal pulses.   Pulmonary/Chest: Effort normal and breath sounds normal.   Abdominal: Soft. Bowel sounds are normal. She exhibits no distension.   Musculoskeletal: Normal range of motion. She exhibits no edema or tenderness.   Lymphadenopathy:     She has no cervical adenopathy.   Neurological: She is alert and oriented to person, place, and time. She has normal reflexes.   Skin: Skin is warm and dry.   Psychiatric: She has a normal mood and affect.       Vitals:    04/11/19 0849   BP: 92/60   Pulse: 70   Weight: 74.2 kg (163 lb 10 oz)   Height: 5' 4" (1.626 m)     Results for BECCA JIMENEZ (MRN [id number]) as of 4/11/2019 09:26   Ref. Range 11/20/2018 19:05   POCT Glucose Latest Ref Range: 70 - 110 mg/dL 66 (L)     Results for BECCA JIMENEZ (MRN [id number]) as of 4/11/2019 09:22   Ref. Range 11/20/2018 20:25   Sodium Latest Ref Range: 136 - 145 mmol/L 136   Potassium Latest Ref Range: 3.5 - 5.1 mmol/L 4.0   Chloride Latest Ref Range: 95 - 110 mmol/L 103   CO2 Latest Ref Range: 23 - 29 mmol/L 24   Anion Gap Latest Ref Range: 8 - 16 mmol/L 9   BUN, Bld Latest Ref Range: 6 - 20 mg/dL 7   Creatinine Latest Ref Range: 0.5 - 1.4 mg/dL 0.7   eGFR if non African American Latest Ref Range: >60 mL/min/1.73 m^2 >60   eGFR if  Latest Ref Range: >60 mL/min/1.73 m^2 >60   Glucose Latest Ref Range: 70 - 110 mg/dL 92   Calcium Latest Ref Range: 8.7 - 10.5 mg/dL 9.9   Alkaline Phosphatase Latest Ref Range: " 55 - 135 U/L 59   Total Protein Latest Ref Range: 6.0 - 8.4 g/dL 8.0   Albumin Latest Ref Range: 3.5 - 5.2 g/dL 4.5   Total Bilirubin Latest Ref Range: 0.1 - 1.0 mg/dL 0.8   AST Latest Ref Range: 10 - 40 U/L 22   ALT Latest Ref Range: 10 - 44 U/L 21   Lipase Latest Ref Range: 4 - 60 U/L 19       Assessment/Plan:     Ms. Rm is a 28 year old woman who is here for evaluation of dizziness and lightheadedness that began almost one year ago. Does not fulfill whipple's triad.   Have asked her to please keep daily journal of symptoms and send in one month.     1. Dizziness

## 2019-04-11 NOTE — PATIENT INSTRUCTIONS
Diary/journal:    Symptoms:  When the symptoms start:  What makes them go away:  Relationship to meals or snacks:    Send picture of diary/journal in about two to four weeks.

## 2019-05-01 ENCOUNTER — OFFICE VISIT (OUTPATIENT)
Dept: ORTHOPEDICS | Facility: CLINIC | Age: 29
End: 2019-05-01
Payer: COMMERCIAL

## 2019-05-01 VITALS
BODY MASS INDEX: 28.17 KG/M2 | DIASTOLIC BLOOD PRESSURE: 64 MMHG | HEIGHT: 64 IN | HEART RATE: 74 BPM | SYSTOLIC BLOOD PRESSURE: 96 MMHG | WEIGHT: 165 LBS

## 2019-05-01 DIAGNOSIS — Z98.890 S/P CERVICAL DISC REPLACEMENT: Primary | ICD-10-CM

## 2019-05-01 DIAGNOSIS — T84.84XA PAINFUL ORTHOPAEDIC HARDWARE: ICD-10-CM

## 2019-05-01 DIAGNOSIS — M50.121 CERVICAL DISC DISORDER AT C4-C5 LEVEL WITH RADICULOPATHY: ICD-10-CM

## 2019-05-01 PROCEDURE — 99213 PR OFFICE/OUTPT VISIT, EST, LEVL III, 20-29 MIN: ICD-10-PCS | Mod: ,,, | Performed by: ORTHOPAEDIC SURGERY

## 2019-05-01 PROCEDURE — 72040 X-RAY EXAM NECK SPINE 2-3 VW: CPT | Mod: ,,, | Performed by: ORTHOPAEDIC SURGERY

## 2019-05-01 PROCEDURE — 72040: ICD-10-PCS | Mod: ,,, | Performed by: ORTHOPAEDIC SURGERY

## 2019-05-01 PROCEDURE — 99213 OFFICE O/P EST LOW 20 MIN: CPT | Mod: ,,, | Performed by: ORTHOPAEDIC SURGERY

## 2019-05-01 RX ORDER — ESCITALOPRAM OXALATE 10 MG/1
TABLET ORAL
Refills: 5 | COMMUNITY
Start: 2019-03-30 | End: 2019-05-01

## 2019-05-01 RX ORDER — MECLIZINE HYDROCHLORIDE 25 MG/1
TABLET ORAL
Refills: 0 | COMMUNITY
Start: 2019-04-05 | End: 2019-08-14

## 2019-05-01 RX ORDER — PROMETHAZINE HYDROCHLORIDE 12.5 MG/1
TABLET ORAL
Refills: 0 | COMMUNITY
Start: 2019-04-05 | End: 2019-08-14

## 2019-05-01 RX ORDER — HYDROCODONE BITARTRATE AND ACETAMINOPHEN 7.5; 325 MG/1; MG/1
1 TABLET ORAL EVERY 6 HOURS PRN
Qty: 28 TABLET | Refills: 0 | Status: SHIPPED | OUTPATIENT
Start: 2019-05-01 | End: 2019-05-08

## 2019-05-01 NOTE — PROGRESS NOTES
Subjective:       Patient ID: Osman Rm is a 28 y.o. female.    Chief Complaint:     History of Present Illness  Patient is here for routine follow-up appointment. She is status post C4-5 total disc arthroplasty about 1 year ago. Overall her symptoms are getting worse. She continues to complain of some significant left posterior cervicalgia with pain and muscle spasm of the sternocleidomastoid, upper trapezius, and deltoid on the left. The pain also radiates down to about the elbow on the left with occasional dysesthesia.    Since she was last here she has been to multiple procedures with her pain management physician including a rhizotomy procedure. Currently Dr. Castillo is recommending a rhizotomy at C7.    The patient is young and is frustrated with her ongoing pain and the limitations that it causes    Current Medications  Current Outpatient Medications   Medication Sig Dispense Refill    ALBUTEROL SULFATE (PROAIR HFA INHL) Inhale into the lungs.      escitalopram oxalate (LEXAPRO) 20 MG tablet TK 1 T PO ONCE D  0    ferrous sulfate 325 mg (65 mg iron) Tab tablet TK 1 T PO D WITH BREAKFAST  11    meclizine (ANTIVERT) 25 mg tablet TK ONE T PO TID PRF DIZZINESS FOR UP TO 10 DAYS  0    promethazine (PHENERGAN) 12.5 MG Tab TK ONE T PO Q 6 H PRF NAUSEA  0    tiZANidine (ZANAFLEX) 4 MG tablet TK 1 T PO  Q 8 H 90 tablet 2    HYDROcodone-acetaminophen (NORCO) 7.5-325 mg per tablet Take 1 tablet by mouth every 6 (six) hours as needed for Pain. 28 tablet 0     No current facility-administered medications for this visit.        Allergies  Review of patient's allergies indicates:   Allergen Reactions    Adhesive tape-silicones Hives       Past Medical History  Past Medical History:   Diagnosis Date    Asthma        Surgical History  Past Surgical History:   Procedure Laterality Date    DECOMPRESSION N/A 6/28/2018    [facility]    DISCECTOMY, SPINE, CERVICAL, ANTERIOR  APPROACH, ARTHROPLASTY C4-5 N/A 6/28/2018    [facility] by [facility] at [facility] OR    medial branch block      SPINAL FUSION      TONSILLECTOMY         Family History:   History reviewed. No pertinent family history.    Social History:   Social History     Socioeconomic History    Marital status:      Spouse name: Not on file    Number of children: Not on file    Years of education: Not on file    Highest education level: Not on file   Occupational History    Not on file   Social Needs    Financial resource strain: Not on file    Food insecurity:     Worry: Not on file     Inability: Not on file    Transportation needs:     Medical: Not on file     Non-medical: Not on file   Tobacco Use    Smoking status: Never Smoker    Smokeless tobacco: Never Used   Substance and Sexual Activity    Alcohol use: No    Drug use: No    Sexual activity: Yes     Partners: Male     Birth control/protection: Abstinence   Lifestyle    Physical activity:     Days per week: Not on file     Minutes per session: Not on file    Stress: Not on file   Relationships    Social connections:     Talks on phone: Not on file     Gets together: Not on file     Attends Quaker service: Not on file     Active member of club or organization: Not on file     Attends meetings of clubs or organizations: Not on file     Relationship status: Not on file   Other Topics Concern    Not on file   Social History Narrative    Not on file       Hospitalization/Major Diagnostic Procedure:     Review of Systems     General/Constitutional:  Chills denies. Fatigue denies. Fever denies. Weight gain denies. Weight loss denies.    Respiratory:  Shortness of breath denies.    Cardiovascular:  Chest pain denies.    Gastrointestinal:  Constipation denies. Diarrhea denies. Nausea denies. Vomiting denies.     Hematology:  Easy bruising denies. Prolonged bleeding denies.     Genitourinary:  Frequent urination denies. Pain in lower back denies.  Painful urination denies.     Musculoskeletal:  See HPI for details    Skin:  Rash denies.    Neurologic:  Dizziness denies. Gait abnormalities denies. Seizures denies. Tingling/Numbess denies.    Psychiatric:  Anxiety denies. Depressed mood denies.     Objective:   Vital Signs:   Vitals:    05/01/19 1104   BP: 96/64   Pulse: 74        Physical Exam      General Examination:     Constitutional: The patient is alert and oriented to lace person and time. Mood is pleasant.     Head/Face: Normal facial features normal eyebrows    Eyes: Normal extraocular motion bilaterally    Lungs: Respirations are equal and unlabored    Gait is coordinated.    Cardiovascular: There are no swelling or varicosities present.    Lymphatic: Negative for adenopathy    Skin: Normal    Neurological: Level of consciousness normal. Oriented to place person and time and situation    Psychiatric: Oriented to time place person and situation    Cervical exam: Decreased range of motion in all planes secondary to guarding. Exquisite tenderness palpation and also spasm of the left sternocleidomastoid and upper trapezius and rhomboid musculature. Bilateral upper extremities a distal neurovascular intact. Anterior cervical incision remains well-healed.    XRAY Report/ Interpretation: AP and lateral x-rays taken in the office today reviewed patient demonstrate lucencies about the superior and inferior aspects of the arthroplasty implant at C4-5 suggestive mechanical loosening of the implant      Assessment:       1. S/P cervical disc replacement    2. Cervical disc disorder at C4-C5 level with radiculopathy s/p C4-5 Cervical spine disectomy, arthroplasty and decompression    3. Painful orthopaedic hardware        Plan:       Osman was seen today for post-op evaluation.    Diagnoses and all orders for this visit:    S/P cervical disc replacement  -     X-Ray Cervical Spine AP And Lateral    Cervical disc disorder at C4-C5 level with radiculopathy s/p  "C4-5 Cervical spine disectomy, arthroplasty and decompression    Painful orthopaedic hardware    Other orders  -     HYDROcodone-acetaminophen (NORCO) 7.5-325 mg per tablet; Take 1 tablet by mouth every 6 (six) hours as needed for Pain.         No follow-ups on file.    Ritesh Fuentes, physician's assistant served in the capacity as a "scribe" for this patient encounter  A "face to face" encounter occurred with Dr. Brennan on this date  The treatment plan and medical decision making is outlined below:  Treatment options were discussed she feels the pains are intractable she clearly has persistent pain based on failure of the hardware she will require removal of the arthroplasty and a conversion to a anterior cervical fusion at C4-5 this is the only way of treating his problem is she wants to resolve her complaints in my opinion we will contact the necessary [place] and she wishes to proceed with surgery has been has been discussed  The technical aspects of the surgery were discussed in detail with the patient today. The patient was able to verbalize an understanding. The procedure risk benefits alternatives and possible complications of the surgical procedure was discussed including expected outcomes. No guarantees were given regards to outcomes. Consent forms were will be signed at a later date. All questions regarding the surgery itself were answered. The patient wishes to proceed with surgery and will be scheduled. The patient may require preoperative medical clearance.    This note was created using Dragon voice recognition software that occasionally misinterpreted phrases or words.  "

## 2019-06-13 ENCOUNTER — TELEPHONE (OUTPATIENT)
Dept: ORTHOPEDICS | Facility: CLINIC | Age: 29
End: 2019-06-13

## 2019-06-13 NOTE — TELEPHONE ENCOUNTER
----- Message from Raine Davidson sent at 6/13/2019 12:40 PM CDT -----  Contact: Patient  Pt called and asked if she can push her surgery date up to August 2?    PTs # [phone]

## 2019-06-13 NOTE — TELEPHONE ENCOUNTER
----- Message from Raine Davidson sent at 6/13/2019 12:40 PM CDT -----  Contact: Patient  Pt called and asked if she can push her surgery date up to August 2?    PTs # [phone]

## 2019-06-17 DIAGNOSIS — T84.84XA PAINFUL ORTHOPAEDIC HARDWARE: Primary | ICD-10-CM

## 2019-06-17 DIAGNOSIS — T84.84XA: ICD-10-CM

## 2019-07-02 ENCOUNTER — TELEPHONE (OUTPATIENT)
Dept: ORTHOPEDICS | Facility: CLINIC | Age: 29
End: 2019-07-02

## 2019-07-02 NOTE — TELEPHONE ENCOUNTER
----- Message from Taylor Ballesteros sent at 7/2/2019  2:07 PM CDT -----  Contact: Patient   PT called and she wanted to know when you would be faxing over her paperwork. I believe she said it was for a medical relase    Pts # [phone]  Fax # [phone]

## 2019-07-30 ENCOUNTER — HOSPITAL ENCOUNTER (OUTPATIENT)
Dept: RADIOLOGY | Facility: HOSPITAL | Age: 29
Discharge: HOME | End: 2019-07-30
Attending: ORTHOPAEDIC SURGERY
Payer: COMMERCIAL

## 2019-07-30 ENCOUNTER — HOSPITAL ENCOUNTER (OUTPATIENT)
Dept: PREADMISSION TESTING | Facility: HOSPITAL | Age: 29
Discharge: HOME | End: 2019-07-30
Attending: ORTHOPAEDIC SURGERY
Payer: COMMERCIAL

## 2019-07-30 ENCOUNTER — ANESTHESIA EVENT (OUTPATIENT)
Dept: SURGERY | Facility: HOSPITAL | Age: 29
DRG: 473 | End: 2019-07-30

## 2019-07-30 VITALS — HEIGHT: 64 IN | BODY MASS INDEX: 28.32 KG/M2

## 2019-07-30 DIAGNOSIS — T84.84XA PAINFUL ORTHOPAEDIC HARDWARE: ICD-10-CM

## 2019-07-30 LAB
ABO + RH BLD: NORMAL
ALBUMIN SERPL BCP-MCNC: 4 G/DL (ref 3.5–5.2)
ALP SERPL-CCNC: 57 U/L (ref 55–135)
ALT SERPL W/O P-5'-P-CCNC: 13 U/L (ref 10–44)
ANION GAP SERPL CALC-SCNC: 8 MMOL/L (ref 8–16)
APTT BLDCRRT: 25.9 SEC (ref 21–32)
AST SERPL-CCNC: 16 U/L (ref 10–40)
BASOPHILS # BLD AUTO: 0.05 K/UL (ref 0–0.2)
BASOPHILS NFR BLD: 0.8 % (ref 0–1.9)
BILIRUB SERPL-MCNC: 0.6 MG/DL (ref 0.1–1)
BILIRUB UR QL STRIP: NEGATIVE
BLD GP AB SCN CELLS X3 SERPL QL: NORMAL
BUN SERPL-MCNC: 9 MG/DL (ref 6–20)
CALCIUM SERPL-MCNC: 9.5 MG/DL (ref 8.7–10.5)
CHLORIDE SERPL-SCNC: 107 MMOL/L (ref 95–110)
CLARITY UR: CLEAR
CO2 SERPL-SCNC: 26 MMOL/L (ref 23–29)
COLOR UR: YELLOW
CREAT SERPL-MCNC: 0.8 MG/DL (ref 0.5–1.4)
DIFFERENTIAL METHOD: ABNORMAL
EOSINOPHIL # BLD AUTO: 0.1 K/UL (ref 0–0.5)
EOSINOPHIL NFR BLD: 2.1 % (ref 0–8)
ERYTHROCYTE [DISTWIDTH] IN BLOOD BY AUTOMATED COUNT: 14.2 % (ref 11.5–14.5)
EST. GFR  (AFRICAN AMERICAN): >60 ML/MIN/1.73 M^2
EST. GFR  (NON AFRICAN AMERICAN): >60 ML/MIN/1.73 M^2
GLUCOSE SERPL-MCNC: 93 MG/DL (ref 70–110)
GLUCOSE UR QL STRIP: NEGATIVE
HCT VFR BLD AUTO: 33.6 % (ref 37–48.5)
HGB BLD-MCNC: 10.6 G/DL (ref 12–16)
HGB UR QL STRIP: NEGATIVE
IMM GRANULOCYTES # BLD AUTO: 0.03 K/UL (ref 0–0.04)
INR PPP: 1.1 (ref 0.8–1.2)
KETONES UR QL STRIP: NEGATIVE
LEUKOCYTE ESTERASE UR QL STRIP: NEGATIVE
LYMPHOCYTES # BLD AUTO: 2.1 K/UL (ref 1–4.8)
LYMPHOCYTES NFR BLD: 34 % (ref 18–48)
MCH RBC QN AUTO: 29.2 PG (ref 27–31)
MCHC RBC AUTO-ENTMCNC: 31.5 G/DL (ref 32–36)
MCV RBC AUTO: 93 FL (ref 82–98)
MONOCYTES # BLD AUTO: 0.4 K/UL (ref 0.3–1)
MONOCYTES NFR BLD: 5.9 % (ref 4–15)
NEUTROPHILS # BLD AUTO: 3.6 K/UL (ref 1.8–7.7)
NEUTROPHILS NFR BLD: 56.7 % (ref 38–73)
NITRITE UR QL STRIP: NEGATIVE
NRBC BLD-RTO: 0 /100 WBC
PH UR STRIP: 7 [PH] (ref 5–8)
PLATELET # BLD AUTO: 279 K/UL (ref 150–350)
PMV BLD AUTO: 10.4 FL (ref 9.2–12.9)
POTASSIUM SERPL-SCNC: 3.8 MMOL/L (ref 3.5–5.1)
PROT SERPL-MCNC: 7.2 G/DL (ref 6–8.4)
PROT UR QL STRIP: NEGATIVE
PROTHROMBIN TIME: 10.8 SEC (ref 9–12.5)
RBC # BLD AUTO: 3.63 M/UL (ref 4–5.4)
SODIUM SERPL-SCNC: 141 MMOL/L (ref 136–145)
SP GR UR STRIP: 1.01 (ref 1–1.03)
URN SPEC COLLECT METH UR: NORMAL
UROBILINOGEN UR STRIP-ACNC: NEGATIVE EU/DL
WBC # BLD AUTO: 6.26 K/UL (ref 3.9–12.7)

## 2019-07-30 PROCEDURE — 99900104 DSU ONLY-NO CHARGE-EA ADD'L HR (STAT)

## 2019-07-30 PROCEDURE — 72040 XR CERVICAL SPINE AP LATERAL: ICD-10-PCS | Mod: 26,,, | Performed by: RADIOLOGY

## 2019-07-30 PROCEDURE — 99900103 DSU ONLY-NO CHARGE-INITIAL HR (STAT)

## 2019-07-30 PROCEDURE — 93010 ELECTROCARDIOGRAM REPORT: CPT | Mod: ,,, | Performed by: INTERNAL MEDICINE

## 2019-07-30 PROCEDURE — 85730 THROMBOPLASTIN TIME PARTIAL: CPT

## 2019-07-30 PROCEDURE — 71046 XR CHEST PA AND LATERAL: ICD-10-PCS | Mod: 26,,, | Performed by: RADIOLOGY

## 2019-07-30 PROCEDURE — 72040 X-RAY EXAM NECK SPINE 2-3 VW: CPT | Mod: 26,,, | Performed by: RADIOLOGY

## 2019-07-30 PROCEDURE — 71046 X-RAY EXAM CHEST 2 VIEWS: CPT | Mod: TC,FY

## 2019-07-30 PROCEDURE — 72040 X-RAY EXAM NECK SPINE 2-3 VW: CPT | Mod: TC,FY

## 2019-07-30 PROCEDURE — 85610 PROTHROMBIN TIME: CPT

## 2019-07-30 PROCEDURE — 85025 COMPLETE CBC W/AUTO DIFF WBC: CPT

## 2019-07-30 PROCEDURE — 36415 COLL VENOUS BLD VENIPUNCTURE: CPT

## 2019-07-30 PROCEDURE — 80053 COMPREHEN METABOLIC PANEL: CPT

## 2019-07-30 PROCEDURE — 93010 EKG 12-LEAD: ICD-10-PCS | Mod: ,,, | Performed by: INTERNAL MEDICINE

## 2019-07-30 PROCEDURE — 71046 X-RAY EXAM CHEST 2 VIEWS: CPT | Mod: 26,,, | Performed by: RADIOLOGY

## 2019-07-30 PROCEDURE — 81003 URINALYSIS AUTO W/O SCOPE: CPT

## 2019-07-30 PROCEDURE — 93005 ELECTROCARDIOGRAM TRACING: CPT

## 2019-07-30 PROCEDURE — 86850 RBC ANTIBODY SCREEN: CPT

## 2019-07-30 NOTE — DISCHARGE INSTRUCTIONS
To confirm, Your doctor has instructed you that surgery is scheduled for: 8/1/19    STEPHEN [phone]    Please report to [facility] the morning of surgery. You must check-in and receive a wristband before going to your procedure.    Pre-Op will call the afternoon prior to surgery between 1:00 and 6:00 PM with the final arrival time.  Phone number: [phone]    PLEASE NOTE:  The surgery schedule has many variables which may affect the time of your surgery case.  Family members should be available if your surgery time changes.  Plan to be here the day of your procedure between 4-6 hours.    MEDICATIONS:  TAKE ONLY THESE MEDICATIONS WITH A SMALL SIP OF WATER THE MORNING OF YOUR PROCEDURE:  -0-    DO NOT TAKE THESE MEDICATIONS 5-7 DAYS PRIOR to your procedure or per your surgeon's request: ASPIRIN, ALEVE, ADVIL, IBUPROFEN, FISH OIL VITAMIN E, HERBALS  (May take Tylenol)    ONLY if you are prescribed any types of blood thinners such as:  Aspirin, Coumadin, Plavix, Pradaxa, Xarelto, Aggrenox, Effient, Eliquis, Savasya, Brilinta, or any other, ask your surgeon whether you should stop taking them and how long before surgery you should stop.  You may also need to verify with the prescribing physician if it is ok to stop your medication.      INSTRUCTIONS IMPORTANT!!  · Do not eat or drink anything between midnight and the time of your procedure- this includes gum, mints, and candy.  · Do not smoke or drink alcoholic beverages 24 hours prior to your procedure.  · Shower the night before AND the morning of your procedure with a Chlorhexidine wash such as Hibiclens or Dial antibacterial soap from the neck down.  Do not get it on your face or in your eyes.  You may use your own shampoo and face wash. This helps your skin to be as bacteria free as possible.    · If you wear contact lenses, dentures, hearing aids or glasses, bring a container to put them in during surgery and give to a  family member for safe keeping.  Please leave all jewelry, piercing's and valuables at home.   · DO NOT remove hair from the surgery site.  Do not shave the incision site unless you are given specific instructions to do so.    · ONLY if you have been diagnosed with sleep apnea please bring your C-PAP machine.  · ONLY if you wear home oxygen please bring your portable oxygen tank the day of your procedure.  · ONLY if you have a history of OPEN HEART SURGERY you will need a clearance from your Cardiologist per Anesthesia.      · ONLY for patients requiring bowel prep, written instructions will be given by your doctor's office.  · ONLY if you have a neuro stimulator, please bring the controller with you the morning of surgery  · ONLY if a type and screen test is needed before surgery, please return:  · If your doctor has scheduled you for an overnight stay, bring a small overnight bag with any personal items you need.  · Make arrangements in advance for transportation home by a responsible adult.  It is not safe to drive a vehicle during the 24 hours after anesthesia.      · Visiting hours are 10:00AM to 8:30PM.  For the safety of all patients, visitors under the age of 12 are not allowed above the first floor of the hospital.    · All [facility] facilities and properties are tobacco free.  Smoking is NOT allowed.       If you have any questions about these instructions, call Pre-Op Admit  Nursing at [phone] or the Pre-Op Day Surgery Unit at [phone].

## 2019-08-01 ENCOUNTER — HOSPITAL ENCOUNTER (INPATIENT)
Facility: HOSPITAL | Age: 29
LOS: 1 days | Discharge: HOME | DRG: 473 | End: 2019-08-02
Attending: ORTHOPAEDIC SURGERY | Admitting: ORTHOPAEDIC SURGERY
Payer: COMMERCIAL

## 2019-08-01 ENCOUNTER — ANESTHESIA (OUTPATIENT)
Dept: SURGERY | Facility: HOSPITAL | Age: 29
DRG: 473 | End: 2019-08-01
Payer: COMMERCIAL

## 2019-08-01 DIAGNOSIS — Z98.890 HISTORY OF CERVICAL SPINAL SURGERY: ICD-10-CM

## 2019-08-01 DIAGNOSIS — T84.84XA: ICD-10-CM

## 2019-08-01 DIAGNOSIS — M50.121 CERVICAL DISC DISORDER AT C4-C5 LEVEL WITH RADICULOPATHY: Primary | ICD-10-CM

## 2019-08-01 DIAGNOSIS — T84.84XA PAINFUL ORTHOPAEDIC HARDWARE: ICD-10-CM

## 2019-08-01 LAB
ANION GAP SERPL CALC-SCNC: 6 MMOL/L (ref 8–16)
B-HCG UR QL: NEGATIVE
BASOPHILS # BLD AUTO: 0.04 K/UL (ref 0–0.2)
BASOPHILS NFR BLD: 0.5 % (ref 0–1.9)
BUN SERPL-MCNC: 9 MG/DL (ref 6–20)
CALCIUM SERPL-MCNC: 8.9 MG/DL (ref 8.7–10.5)
CHLORIDE SERPL-SCNC: 109 MMOL/L (ref 95–110)
CO2 SERPL-SCNC: 22 MMOL/L (ref 23–29)
CREAT SERPL-MCNC: 0.8 MG/DL (ref 0.5–1.4)
CTP QC/QA: YES
DIFFERENTIAL METHOD: ABNORMAL
EOSINOPHIL # BLD AUTO: 0.2 K/UL (ref 0–0.5)
EOSINOPHIL NFR BLD: 1.8 % (ref 0–8)
ERYTHROCYTE [DISTWIDTH] IN BLOOD BY AUTOMATED COUNT: 14 % (ref 11.5–14.5)
EST. GFR  (AFRICAN AMERICAN): >60 ML/MIN/1.73 M^2
EST. GFR  (NON AFRICAN AMERICAN): >60 ML/MIN/1.73 M^2
GLUCOSE SERPL-MCNC: 105 MG/DL (ref 70–110)
HCT VFR BLD AUTO: 34 % (ref 37–48.5)
HGB BLD-MCNC: 10.5 G/DL (ref 12–16)
IMM GRANULOCYTES # BLD AUTO: 0.04 K/UL (ref 0–0.04)
LYMPHOCYTES # BLD AUTO: 2.1 K/UL (ref 1–4.8)
LYMPHOCYTES NFR BLD: 24.7 % (ref 18–48)
MCH RBC QN AUTO: 29.2 PG (ref 27–31)
MCHC RBC AUTO-ENTMCNC: 30.9 G/DL (ref 32–36)
MCV RBC AUTO: 94 FL (ref 82–98)
MONOCYTES # BLD AUTO: 0.3 K/UL (ref 0.3–1)
MONOCYTES NFR BLD: 3.6 % (ref 4–15)
NEUTROPHILS # BLD AUTO: 5.9 K/UL (ref 1.8–7.7)
NEUTROPHILS NFR BLD: 68.9 % (ref 38–73)
NRBC BLD-RTO: 0 /100 WBC
PLATELET # BLD AUTO: 260 K/UL (ref 150–350)
PMV BLD AUTO: 10.4 FL (ref 9.2–12.9)
POTASSIUM SERPL-SCNC: 4.1 MMOL/L (ref 3.5–5.1)
RBC # BLD AUTO: 3.6 M/UL (ref 4–5.4)
SODIUM SERPL-SCNC: 137 MMOL/L (ref 136–145)
WBC # BLD AUTO: 8.5 K/UL (ref 3.9–12.7)

## 2019-08-01 PROCEDURE — 25000003 PHARM REV CODE 250: Performed by: HOSPITALIST

## 2019-08-01 PROCEDURE — 27800903 OPTIME MED/SURG SUP & DEVICES OTHER IMPLANTS: Performed by: ORTHOPAEDIC SURGERY

## 2019-08-01 PROCEDURE — 63600175 PHARM REV CODE 636 W HCPCS: Performed by: NURSE ANESTHETIST, CERTIFIED REGISTERED

## 2019-08-01 PROCEDURE — 94770 HC EXHALED C02 TEST: CPT

## 2019-08-01 PROCEDURE — 27000221 HC OXYGEN, UP TO 24 HOURS

## 2019-08-01 PROCEDURE — 63600175 PHARM REV CODE 636 W HCPCS: Performed by: PHYSICIAN ASSISTANT

## 2019-08-01 PROCEDURE — 71000039 HC RECOVERY, EACH ADD'L HOUR: Performed by: ORTHOPAEDIC SURGERY

## 2019-08-01 PROCEDURE — 99900104 DSU ONLY-NO CHARGE-EA ADD'L HR (STAT): Performed by: ORTHOPAEDIC SURGERY

## 2019-08-01 PROCEDURE — 36415 COLL VENOUS BLD VENIPUNCTURE: CPT

## 2019-08-01 PROCEDURE — 27201423 OPTIME MED/SURG SUP & DEVICES STERILE SUPPLY: Performed by: ORTHOPAEDIC SURGERY

## 2019-08-01 PROCEDURE — D9220A PRA ANESTHESIA: ICD-10-PCS | Mod: ANES,,, | Performed by: ANESTHESIOLOGY

## 2019-08-01 PROCEDURE — 94761 N-INVAS EAR/PLS OXIMETRY MLT: CPT

## 2019-08-01 PROCEDURE — 71000033 HC RECOVERY, INTIAL HOUR: Performed by: ORTHOPAEDIC SURGERY

## 2019-08-01 PROCEDURE — 25000003 PHARM REV CODE 250: Performed by: ORTHOPAEDIC SURGERY

## 2019-08-01 PROCEDURE — 94799 UNLISTED PULMONARY SVC/PX: CPT

## 2019-08-01 PROCEDURE — C1713 ANCHOR/SCREW BN/BN,TIS/BN: HCPCS | Performed by: ORTHOPAEDIC SURGERY

## 2019-08-01 PROCEDURE — 36000710: Performed by: ORTHOPAEDIC SURGERY

## 2019-08-01 PROCEDURE — 25000003 PHARM REV CODE 250: Performed by: NURSE ANESTHETIST, CERTIFIED REGISTERED

## 2019-08-01 PROCEDURE — 99900103 DSU ONLY-NO CHARGE-INITIAL HR (STAT): Performed by: ORTHOPAEDIC SURGERY

## 2019-08-01 PROCEDURE — D9220A PRA ANESTHESIA: Mod: CRNA,,, | Performed by: NURSE ANESTHETIST, CERTIFIED REGISTERED

## 2019-08-01 PROCEDURE — 63600175 PHARM REV CODE 636 W HCPCS: Performed by: ANESTHESIOLOGY

## 2019-08-01 PROCEDURE — 99900035 HC TECH TIME PER 15 MIN (STAT)

## 2019-08-01 PROCEDURE — D9220A PRA ANESTHESIA: Mod: ANES,,, | Performed by: ANESTHESIOLOGY

## 2019-08-01 PROCEDURE — 12000002 HC ACUTE/MED SURGE SEMI-PRIVATE ROOM

## 2019-08-01 PROCEDURE — 37000009 HC ANESTHESIA EA ADD 15 MINS: Performed by: ORTHOPAEDIC SURGERY

## 2019-08-01 PROCEDURE — 80048 BASIC METABOLIC PNL TOTAL CA: CPT

## 2019-08-01 PROCEDURE — 81025 URINE PREGNANCY TEST: CPT | Performed by: ORTHOPAEDIC SURGERY

## 2019-08-01 PROCEDURE — 85025 COMPLETE CBC W/AUTO DIFF WBC: CPT

## 2019-08-01 PROCEDURE — 37000008 HC ANESTHESIA 1ST 15 MINUTES: Performed by: ORTHOPAEDIC SURGERY

## 2019-08-01 PROCEDURE — D9220A PRA ANESTHESIA: ICD-10-PCS | Mod: CRNA,,, | Performed by: NURSE ANESTHETIST, CERTIFIED REGISTERED

## 2019-08-01 PROCEDURE — 25000003 PHARM REV CODE 250: Performed by: PHYSICIAN ASSISTANT

## 2019-08-01 PROCEDURE — 63600175 PHARM REV CODE 636 W HCPCS: Performed by: ORTHOPAEDIC SURGERY

## 2019-08-01 PROCEDURE — S0020 INJECTION, BUPIVICAINE HYDRO: HCPCS | Performed by: ORTHOPAEDIC SURGERY

## 2019-08-01 PROCEDURE — 36000711: Performed by: ORTHOPAEDIC SURGERY

## 2019-08-01 DEVICE — IMPLANTABLE DEVICE: Type: IMPLANTABLE DEVICE | Site: NECK | Status: FUNCTIONAL

## 2019-08-01 RX ORDER — HYDROMORPHONE HYDROCHLORIDE 2 MG/ML
0.2 INJECTION, SOLUTION INTRAMUSCULAR; INTRAVENOUS; SUBCUTANEOUS EVERY 5 MIN PRN
Status: DISCONTINUED | OUTPATIENT
Start: 2019-08-01 | End: 2019-08-01 | Stop reason: HOSPADM

## 2019-08-01 RX ORDER — ROCURONIUM BROMIDE 10 MG/ML
INJECTION, SOLUTION INTRAVENOUS
Status: DISCONTINUED | OUTPATIENT
Start: 2019-08-01 | End: 2019-08-01

## 2019-08-01 RX ORDER — BUPIVACAINE HYDROCHLORIDE 5 MG/ML
INJECTION, SOLUTION EPIDURAL; INTRACAUDAL
Status: DISCONTINUED | OUTPATIENT
Start: 2019-08-01 | End: 2019-08-01 | Stop reason: HOSPADM

## 2019-08-01 RX ORDER — CEFAZOLIN SODIUM 2 G/50ML
2 SOLUTION INTRAVENOUS
Status: COMPLETED | OUTPATIENT
Start: 2019-08-01 | End: 2019-08-01

## 2019-08-01 RX ORDER — OXYCODONE HYDROCHLORIDE 5 MG/1
5 TABLET ORAL EVERY 4 HOURS PRN
Status: DISCONTINUED | OUTPATIENT
Start: 2019-08-01 | End: 2019-08-02 | Stop reason: HOSPADM

## 2019-08-01 RX ORDER — ONDANSETRON 2 MG/ML
INJECTION INTRAMUSCULAR; INTRAVENOUS
Status: DISCONTINUED | OUTPATIENT
Start: 2019-08-01 | End: 2019-08-01

## 2019-08-01 RX ORDER — LIDOCAINE HYDROCHLORIDE 10 MG/ML
1 INJECTION, SOLUTION EPIDURAL; INFILTRATION; INTRACAUDAL; PERINEURAL ONCE
Status: DISCONTINUED | OUTPATIENT
Start: 2019-08-01 | End: 2019-08-01 | Stop reason: HOSPADM

## 2019-08-01 RX ORDER — DIPHENHYDRAMINE HCL 12.5MG/5ML
25 LIQUID (ML) ORAL EVERY 6 HOURS PRN
Status: DISCONTINUED | OUTPATIENT
Start: 2019-08-01 | End: 2019-08-02 | Stop reason: HOSPADM

## 2019-08-01 RX ORDER — MECLIZINE HYDROCHLORIDE 25 MG/1
25 TABLET ORAL 3 TIMES DAILY PRN
Status: DISCONTINUED | OUTPATIENT
Start: 2019-08-01 | End: 2019-08-02 | Stop reason: HOSPADM

## 2019-08-01 RX ORDER — OXYCODONE HYDROCHLORIDE 5 MG/1
5 TABLET ORAL
Status: DISCONTINUED | OUTPATIENT
Start: 2019-08-01 | End: 2019-08-01 | Stop reason: HOSPADM

## 2019-08-01 RX ORDER — MEPERIDINE HYDROCHLORIDE 50 MG/ML
12.5 INJECTION INTRAMUSCULAR; INTRAVENOUS; SUBCUTANEOUS ONCE
Status: COMPLETED | OUTPATIENT
Start: 2019-08-01 | End: 2019-08-01

## 2019-08-01 RX ORDER — KETAMINE HYDROCHLORIDE 10 MG/ML
INJECTION, SOLUTION INTRAMUSCULAR; INTRAVENOUS
Status: DISCONTINUED | OUTPATIENT
Start: 2019-08-01 | End: 2019-08-01

## 2019-08-01 RX ORDER — SODIUM CHLORIDE, SODIUM LACTATE, POTASSIUM CHLORIDE, CALCIUM CHLORIDE 600; 310; 30; 20 MG/100ML; MG/100ML; MG/100ML; MG/100ML
INJECTION, SOLUTION INTRAVENOUS CONTINUOUS
Status: DISCONTINUED | OUTPATIENT
Start: 2019-08-01 | End: 2019-08-01

## 2019-08-01 RX ORDER — SUCCINYLCHOLINE CHLORIDE 20 MG/ML
INJECTION INTRAMUSCULAR; INTRAVENOUS
Status: DISCONTINUED | OUTPATIENT
Start: 2019-08-01 | End: 2019-08-01

## 2019-08-01 RX ORDER — ACETAMINOPHEN 10 MG/ML
1000 INJECTION, SOLUTION INTRAVENOUS EVERY 8 HOURS
Status: COMPLETED | OUTPATIENT
Start: 2019-08-01 | End: 2019-08-02

## 2019-08-01 RX ORDER — ACETAMINOPHEN 325 MG/1
650 TABLET ORAL EVERY 6 HOURS PRN
Status: DISCONTINUED | OUTPATIENT
Start: 2019-08-02 | End: 2019-08-02 | Stop reason: HOSPADM

## 2019-08-01 RX ORDER — HYDROMORPHONE HCL IN 0.9% NACL 6 MG/30 ML
PATIENT CONTROLLED ANALGESIA SYRINGE INTRAVENOUS CONTINUOUS
Status: DISCONTINUED | OUTPATIENT
Start: 2019-08-01 | End: 2019-08-02

## 2019-08-01 RX ORDER — HYDROMORPHONE HYDROCHLORIDE 2 MG/ML
2 INJECTION, SOLUTION INTRAMUSCULAR; INTRAVENOUS; SUBCUTANEOUS
Status: DISCONTINUED | OUTPATIENT
Start: 2019-08-01 | End: 2019-08-02 | Stop reason: HOSPADM

## 2019-08-01 RX ORDER — AMOXICILLIN 250 MG
2 CAPSULE ORAL NIGHTLY PRN
Status: DISCONTINUED | OUTPATIENT
Start: 2019-08-01 | End: 2019-08-02 | Stop reason: HOSPADM

## 2019-08-01 RX ORDER — BACITRACIN 50000 [IU]/1
INJECTION, POWDER, FOR SOLUTION INTRAMUSCULAR
Status: DISCONTINUED | OUTPATIENT
Start: 2019-08-01 | End: 2019-08-01 | Stop reason: HOSPADM

## 2019-08-01 RX ORDER — DOCUSATE SODIUM 100 MG/1
100 CAPSULE, LIQUID FILLED ORAL DAILY
Status: DISCONTINUED | OUTPATIENT
Start: 2019-08-01 | End: 2019-08-02 | Stop reason: HOSPADM

## 2019-08-01 RX ORDER — PROPOFOL 10 MG/ML
VIAL (ML) INTRAVENOUS CONTINUOUS PRN
Status: DISCONTINUED | OUTPATIENT
Start: 2019-08-01 | End: 2019-08-01

## 2019-08-01 RX ORDER — ALBUTEROL SULFATE 2.5 MG/.5ML
2.5 SOLUTION RESPIRATORY (INHALATION) EVERY 6 HOURS PRN
Status: DISCONTINUED | OUTPATIENT
Start: 2019-08-01 | End: 2019-08-02 | Stop reason: HOSPADM

## 2019-08-01 RX ORDER — OXYCODONE HYDROCHLORIDE 10 MG/1
10 TABLET ORAL EVERY 4 HOURS PRN
Status: DISCONTINUED | OUTPATIENT
Start: 2019-08-01 | End: 2019-08-02 | Stop reason: HOSPADM

## 2019-08-01 RX ORDER — DIPHENHYDRAMINE HCL 25 MG
50 CAPSULE ORAL EVERY 6 HOURS PRN
Status: DISCONTINUED | OUTPATIENT
Start: 2019-08-01 | End: 2019-08-01

## 2019-08-01 RX ORDER — MIDAZOLAM HYDROCHLORIDE 1 MG/ML
INJECTION, SOLUTION INTRAMUSCULAR; INTRAVENOUS
Status: DISCONTINUED | OUTPATIENT
Start: 2019-08-01 | End: 2019-08-01

## 2019-08-01 RX ORDER — MUPIROCIN 20 MG/G
1 OINTMENT TOPICAL 2 TIMES DAILY
Status: DISCONTINUED | OUTPATIENT
Start: 2019-08-01 | End: 2019-08-02 | Stop reason: HOSPADM

## 2019-08-01 RX ORDER — BISACODYL 10 MG
10 SUPPOSITORY, RECTAL RECTAL DAILY
Status: DISCONTINUED | OUTPATIENT
Start: 2019-08-01 | End: 2019-08-02 | Stop reason: HOSPADM

## 2019-08-01 RX ORDER — ALBUTEROL SULFATE 90 UG/1
2 AEROSOL, METERED RESPIRATORY (INHALATION) EVERY 6 HOURS PRN
Status: DISCONTINUED | OUTPATIENT
Start: 2019-08-01 | End: 2019-08-01

## 2019-08-01 RX ORDER — ONDANSETRON 8 MG/1
8 TABLET, ORALLY DISINTEGRATING ORAL EVERY 6 HOURS PRN
Status: DISCONTINUED | OUTPATIENT
Start: 2019-08-01 | End: 2019-08-02 | Stop reason: HOSPADM

## 2019-08-01 RX ORDER — MAG HYDROX/ALUMINUM HYD/SIMETH 200-200-20
30 SUSPENSION, ORAL (FINAL DOSE FORM) ORAL EVERY 4 HOURS PRN
Status: DISCONTINUED | OUTPATIENT
Start: 2019-08-01 | End: 2019-08-02 | Stop reason: HOSPADM

## 2019-08-01 RX ORDER — DEXAMETHASONE SODIUM PHOSPHATE 4 MG/ML
INJECTION, SOLUTION INTRA-ARTICULAR; INTRALESIONAL; INTRAMUSCULAR; INTRAVENOUS; SOFT TISSUE
Status: DISCONTINUED | OUTPATIENT
Start: 2019-08-01 | End: 2019-08-01

## 2019-08-01 RX ORDER — FENTANYL CITRATE 50 UG/ML
INJECTION, SOLUTION INTRAMUSCULAR; INTRAVENOUS
Status: DISCONTINUED | OUTPATIENT
Start: 2019-08-01 | End: 2019-08-01

## 2019-08-01 RX ORDER — PROPOFOL 10 MG/ML
VIAL (ML) INTRAVENOUS
Status: DISCONTINUED | OUTPATIENT
Start: 2019-08-01 | End: 2019-08-01

## 2019-08-01 RX ORDER — HYDROMORPHONE HYDROCHLORIDE 1 MG/ML
1 INJECTION, SOLUTION INTRAMUSCULAR; INTRAVENOUS; SUBCUTANEOUS
Status: DISCONTINUED | OUTPATIENT
Start: 2019-08-01 | End: 2019-08-02 | Stop reason: HOSPADM

## 2019-08-01 RX ORDER — NALOXONE HCL 0.4 MG/ML
0.02 VIAL (ML) INJECTION
Status: DISCONTINUED | OUTPATIENT
Start: 2019-08-01 | End: 2019-08-02 | Stop reason: HOSPADM

## 2019-08-01 RX ORDER — LIDOCAINE HCL/PF 100 MG/5ML
SYRINGE (ML) INTRAVENOUS
Status: DISCONTINUED | OUTPATIENT
Start: 2019-08-01 | End: 2019-08-01

## 2019-08-01 RX ORDER — FENTANYL CITRATE 50 UG/ML
25 INJECTION, SOLUTION INTRAMUSCULAR; INTRAVENOUS EVERY 5 MIN PRN
Status: DISCONTINUED | OUTPATIENT
Start: 2019-08-01 | End: 2019-08-01 | Stop reason: HOSPADM

## 2019-08-01 RX ORDER — RAMELTEON 8 MG/1
8 TABLET ORAL NIGHTLY PRN
Status: DISCONTINUED | OUTPATIENT
Start: 2019-08-01 | End: 2019-08-02 | Stop reason: HOSPADM

## 2019-08-01 RX ADMIN — DEXAMETHASONE SODIUM PHOSPHATE 8 MG: 10 INJECTION INTRAMUSCULAR; INTRAVENOUS at 08:08

## 2019-08-01 RX ADMIN — DEXTROSE 2 G: 50 INJECTION, SOLUTION INTRAVENOUS at 08:08

## 2019-08-01 RX ADMIN — KETAMINE HYDROCHLORIDE 20 MG: 10 INJECTION, SOLUTION INTRAMUSCULAR; INTRAVENOUS at 11:08

## 2019-08-01 RX ADMIN — ACETAMINOPHEN 1000 MG: 10 INJECTION, SOLUTION INTRAVENOUS at 09:08

## 2019-08-01 RX ADMIN — ROCURONIUM BROMIDE 10 MG: 10 INJECTION, SOLUTION INTRAVENOUS at 10:08

## 2019-08-01 RX ADMIN — HYDROMORPHONE HYDROCHLORIDE 0.2 MG: 2 INJECTION INTRAMUSCULAR; INTRAVENOUS; SUBCUTANEOUS at 01:08

## 2019-08-01 RX ADMIN — MIDAZOLAM 1 MG: 1 INJECTION INTRAMUSCULAR; INTRAVENOUS at 10:08

## 2019-08-01 RX ADMIN — ONDANSETRON 4 MG: 2 INJECTION, SOLUTION INTRAMUSCULAR; INTRAVENOUS at 11:08

## 2019-08-01 RX ADMIN — SUCCINYLCHOLINE CHLORIDE 140 MG: 20 INJECTION, SOLUTION INTRAMUSCULAR; INTRAVENOUS at 10:08

## 2019-08-01 RX ADMIN — DEXAMETHASONE SODIUM PHOSPHATE 8 MG: 4 INJECTION, SOLUTION INTRAMUSCULAR; INTRAVENOUS at 11:08

## 2019-08-01 RX ADMIN — KETAMINE HYDROCHLORIDE 50 MG: 10 INJECTION, SOLUTION INTRAMUSCULAR; INTRAVENOUS at 12:08

## 2019-08-01 RX ADMIN — PROPOFOL 180 MG: 10 INJECTION, EMULSION INTRAVENOUS at 10:08

## 2019-08-01 RX ADMIN — FENTANYL CITRATE 100 MCG: 50 INJECTION, SOLUTION INTRAMUSCULAR; INTRAVENOUS at 10:08

## 2019-08-01 RX ADMIN — MIDAZOLAM 2 MG: 1 INJECTION INTRAMUSCULAR; INTRAVENOUS at 10:08

## 2019-08-01 RX ADMIN — SODIUM CHLORIDE, SODIUM LACTATE, POTASSIUM CHLORIDE, AND CALCIUM CHLORIDE: .6; .31; .03; .02 INJECTION, SOLUTION INTRAVENOUS at 11:08

## 2019-08-01 RX ADMIN — CEFAZOLIN SODIUM 2 G: 2 SOLUTION INTRAVENOUS at 10:08

## 2019-08-01 RX ADMIN — MEPERIDINE HYDROCHLORIDE 12.5 MG: 50 INJECTION, SOLUTION INTRAMUSCULAR; INTRAVENOUS; SUBCUTANEOUS at 01:08

## 2019-08-01 RX ADMIN — ONDANSETRON 8 MG: 8 TABLET, ORALLY DISINTEGRATING ORAL at 09:08

## 2019-08-01 RX ADMIN — DIPHENHYDRAMINE HYDROCHLORIDE 25 MG: 25 LIQUID ORAL at 08:08

## 2019-08-01 RX ADMIN — ACETAMINOPHEN 1000 MG: 10 INJECTION, SOLUTION INTRAVENOUS at 02:08

## 2019-08-01 RX ADMIN — PROPOFOL 75 MCG/KG/MIN: 10 INJECTION, EMULSION INTRAVENOUS at 11:08

## 2019-08-01 RX ADMIN — Medication: at 09:08

## 2019-08-01 RX ADMIN — SODIUM CHLORIDE, SODIUM LACTATE, POTASSIUM CHLORIDE, AND CALCIUM CHLORIDE: .6; .31; .03; .02 INJECTION, SOLUTION INTRAVENOUS at 01:08

## 2019-08-01 RX ADMIN — KETAMINE HYDROCHLORIDE 30 MG: 10 INJECTION, SOLUTION INTRAMUSCULAR; INTRAVENOUS at 10:08

## 2019-08-01 RX ADMIN — LIDOCAINE HYDROCHLORIDE 100 MG: 20 INJECTION, SOLUTION INTRAVENOUS at 10:08

## 2019-08-01 RX ADMIN — SODIUM CHLORIDE, SODIUM LACTATE, POTASSIUM CHLORIDE, AND CALCIUM CHLORIDE: .6; .31; .03; .02 INJECTION, SOLUTION INTRAVENOUS at 08:08

## 2019-08-01 RX ADMIN — FENTANYL CITRATE 50 MCG: 50 INJECTION, SOLUTION INTRAMUSCULAR; INTRAVENOUS at 11:08

## 2019-08-01 RX ADMIN — Medication: at 02:08

## 2019-08-01 RX ADMIN — MUPIROCIN 1 G: 20 OINTMENT TOPICAL at 09:08

## 2019-08-01 NOTE — BRIEF OP NOTE
[facility]  Brief Operative Note    SUMMARY     Surgery Date: 8/1/2019     Surgeon(s) and Role:     * Andrew Brennan MD - Primary    Assisting Surgeon: tomy Fuentes    Pre-op Diagnosis:  Painful orthopaedic hardware [T84.84XA]    Post-op Diagnosis:  Post-Op Diagnosis Codes:     * Painful orthopaedic hardware [T84.84XA]    Procedure(s) (LRB):  ARTHROPLASTY-RESECTION (N/A)  DISCECTOMY, SPINE, CERVICAL, ANTERIOR APPROACH, WITH FUSION C4-5 (N/A)  FUSION, SPINE, WITH INSTRUMENTATION C4-5 (N/A)  BONE GRAFT (N/A)    Anesthesia: General    Description of Procedure:  Removal of failed cervical disc arthroplasty C4-5 anterior cervical diskectomy and fusion C4-5 with iliac crest bone graft, insertion of interbody device C4-5, anterior plate fixation C4 through C5    Description of the findings of the procedure:  Failed a cervical disc arthroplasty with aseptic loosening    Estimated Blood Loss: 100 mL         Specimens:   Specimen (12h ago, onward)    None

## 2019-08-01 NOTE — PLAN OF CARE
Report to Makenzie. Patient states pain 5, no nausea, sleepy, PCA in use. Appears comfortable. Dressing to neck dry intact, no drainage to dressing.Two KAYLEE drains, one to neck, the other to side, both with scant amount of blood in drain. VS stable, Family at bedside.

## 2019-08-01 NOTE — ANESTHESIA POSTPROCEDURE EVALUATION
Anesthesia Post Evaluation    Patient: Osman Rm    Procedure(s) Performed: Procedure(s) (LRB):  ARTHROPLASTY-RESECTION (N/A)  DISCECTOMY, SPINE, CERVICAL, ANTERIOR APPROACH, WITH FUSION C4-5 (N/A)  FUSION, SPINE, WITH INSTRUMENTATION C4-5 (N/A)  BONE GRAFT (N/A)    Final Anesthesia Type: general  Patient location during evaluation: PACU  Patient participation: Yes- Able to Participate  Level of consciousness: sedated and awake  Post-procedure vital signs: reviewed and stable  Pain management: adequate  Airway patency: patent  PONV status at discharge: No PONV  Anesthetic complications: no      Cardiovascular status: blood pressure returned to baseline  Respiratory status: spontaneous ventilation  Hydration status: euvolemic  Follow-up not needed.          Vitals Value Taken Time   /55 8/1/2019  1:01 PM   Temp 36.7 °C (98.1 °F) 8/1/2019  7:49 AM   Pulse 98 8/1/2019  1:01 PM   Resp 7 8/1/2019  1:01 PM   SpO2 100 % 8/1/2019  1:01 PM   Vitals shown include unvalidated device data.      No case tracking events are documented in the log.      Pain/Brandon Score: No data recorded

## 2019-08-01 NOTE — OP NOTE
DATE OF PROCEDURE:  08/01/2019    PREOPERATIVE DIAGNOSIS:  Painful orthopedic implant consisting of C4 to C5   cervical disk arthroplasty.    FINAL DIAGNOSES:  1.  Painful cervical disk arthroplasty.  2.  Failure of implant to obtain bony ingrowth C4 to C5 device.    PROCEDURES PERFORMED:  1.  Removal of C4 to C5 cervical disk arthroplasty implant, CPT code 36847.  2.  Anterior cervical diskectomy and spinal fusion, C4 to C5, CPT code 94727.  3.  Insertion of intervertebral prosthetic interbody device, C4 to C5 level   consisting of Medtronic 7 mm titanium coated PEEK implant, CPT code 90113.  4.  Anterior cervical instrumentation, C4 to C5 with Medtronic 23 mm plate and   screws, CPT code 00727.  5.  Harvesting morselized autograft, right iliac crest through a separate   incision for spinal fusion, CPT code 88094.    SURGEON:  Andrew Brennan M.D.    ASSISTANT:  JOSS Mcdonnell.    ANESTHESIA:  General.    OPERATIVE REPORT:  Due to the nature and complexity of the surgical procedure, a   skilled surgical assistant was necessary.  Ritesh Fuentes physician's   assistant served in that role.  His role included, but was not limited to   assisting with patient positioning, surgical exposure and wound closure.  No   qualified resident physician was available.  The patient was brought to the   Operating Room and placed on table in supine position and intubated an   endotracheal tube, which allowed monitoring.  The recurrent laryngeal nerve was   used and neuromonitoring leads were placed on the patient.  The arms were tucked   at the side.  Head halter traction was used with the Nguyen extension on the   operating table to place the head on the Nguyen attachment and her neck in a   neutral position.  We visualized the C4 to C5 level and the interbody device   with fluoroscopy and then prepped the right iliac crest and anterior cervical   areas first with alcohol and then ChloraPrep solution.  The previous  incision on   the right side at C4 to C5 was utilized ellipsing this incision.  We then   mobilized soft tissues and bluntly dissected through the interval between the   trachea and esophagus medially and carotid sheath laterally through the   superficial and deep cervical fascia and the previous scar down to the anterior   cervical spine with fluoroscopy.  We were then able to identify the interbody   device at C4 to C5.  Bena distraction pins were placed above and below the   interbody device and then self-retaining retractors were inserted.  There was   some anterior scar tissue that was removed to fully allow exposure of the   interbody device.  Using an osteotome, we then placed it above and below the   endplates of the interbody devices and without any difficulty we removed the   interbody device.  The interbody device was carefully inspected.  There was no   bony ingrowth on either the superior or inferior endplate of the implant.  Using   a motorized sophia, we then removed some posterior osteophytes in order to make   that and removed some posterior endplate of C5 to make the endplates relatively   paralyzed and also removed the remaining disk material in the posterior annular   area and medially and laterally.  This allowed insertion of trial interbody   sizers and a 7 mm trial gave a good fit and contact with the endplates.  We then   made a separate incision on the right iliac crest and exposed the iliac crest   making a rectangular window in it so that we could obtain some cancellous bone   from in between the inner and outer tables.  Once a sufficient amount of bone   was obtained, we then placed some Gelfoam soaked in Marcaine in the defect and   then brought out drain through a separate stab incision, closing the iliac crest   donor site incision with stainless steel staples.  A Medtronic 7 mm interbody   implant was brought onto the field and filled with autogenous cancellous bone   graft.  The 7 mm  implant was then inserted and impacted into the C4 to C5 disk   spaces.  Positioning confirmed with the image intensifier.  We removed the   [place] distraction pins and used a 23 mm Medtronic titanium plate across the   interspace and then fixed it to the spine with two screws into C4 and two into   C5 obtaining good bony purchase.  We then engaged the locking mechanism after   checking the construct with the image intensifier.  A drain was brought through   a separate stab incision and the cervical incision was closed in layers using   Dermabond on the skin.  Throughout the case, there were no abnormalities noted   on neuromonitoring.  Sponge and needle counts were correct.  Estimated blood   loss was 10 mL.      LUIS EDUARDO/IN  dd: 08/01/2019 12:35:58 (CDT)  td: 08/01/2019 16:09:28 (CDT)  Doc ID   #[id number]  Job ID #[id number]    CC:

## 2019-08-01 NOTE — H&P
CC/Indication for Procedure: 28 y.o. female with Painful orthopaedic hardware [T84.84XA]  Pain due to internal orthopedic prosthetic device [T84.84XA].    Patient scheduled for NECK SPINE FUSE&REMOVE AD [77940] (ARTHROPLASTY-RESECTION)  ND ANTERIOR INSTRUMENTATION 2-3 VERTEBRAL SEGMENTS [26666] (DISCECTOMY, SPINE, CERVICAL, ANTERIOR APPROACH, WITH FUSION C4-5)  ND INSERT BIOMECH DEV W/INTERBODY ARTHRODESIS, EA CONTIGUOUS DEFECT [37345] (FUSION, SPINE, WITH INSTRUMENTATION C4-5)  ND AUTOGRAFT SPINE SURGERY MORSELIZED SEP INCISION [13829] (BONE GRAFT)  ND REMOVE TOTAL DISC ARTHROPLASTY, CERVICAL, SINGLE [64412].    Past Medical History:   Diagnosis Date    Asthma      Past Surgical History:   Procedure Laterality Date    DECOMPRESSION N/A 6/28/2018    Performed by Andrew Brennan MD at [facility] OR    DISCECTOMY, SPINE, CERVICAL, ANTERIOR APPROACH, ARTHROPLASTY C4-5 N/A 6/28/2018    Performed by Andrew Brennan MD at [facility] OR    medial branch block      SPINAL FUSION      TONSILLECTOMY       History reviewed. No pertinent family history.  Social History     Socioeconomic History    Marital status:      Spouse name: Not on file    Number of children: Not on file    Years of education: Not on file    Highest education level: Not on file   Occupational History    Not on file   Social Needs    Financial resource strain: Not on file    Food insecurity:     Worry: Not on file     Inability: Not on file    Transportation needs:     Medical: Not on file     Non-medical: Not on file   Tobacco Use    Smoking status: Never Smoker    Smokeless tobacco: Never Used   Substance and Sexual Activity    Alcohol use: No    Drug use: No    Sexual activity: Yes     Partners: Male     Birth control/protection: Abstinence   Lifestyle    Physical activity:     Days per week: Not on file     Minutes per session: Not on file    Stress: Not on file   Relationships    Social connections:     Talks on phone: Not on file      Gets together: Not on file     Attends Congregational service: Not on file     Active member of club or organization: Not on file     Attends meetings of clubs or organizations: Not on file     Relationship status: Not on file   Other Topics Concern    Not on file   Social History Narrative    Not on file       Review of patient's allergies indicates:   Allergen Reactions    Adhesive tape-silicones Hives         Current Facility-Administered Medications:     cefazolin (ANCEF) 2 gram in dextrose 5% 50 mL IVPB (premix), 2 g, Intravenous, On Call Procedure, Andrew Brennan MD    lactated ringers infusion, , Intravenous, Continuous, Gris Manriquez MD    lidocaine (PF) 10 mg/ml (1%) injection 10 mg, 1 mL, Intradermal, Once, Gris Manriquez MD    ROS:    Denies chest pain or palpitations  Denies shortness of breath  Denies fevers or chills  Denies chest pain  Denies abdominal pain    PE:    General Appearance: Well nourished  Orientation: Oriented to time, place, person  Mental Status: Alert  Heart: RRR  Lungs: CTA  Abdomen: Soft and non-tender    Anesthesia/Surgery risks, benefits and alternative options discussed and understood by patient/family.    This note was created using Dragon voice recognition software that occasionally misinterpreted phrases or words.

## 2019-08-01 NOTE — TRANSFER OF CARE
"Anesthesia Transfer of Care Note    Patient: Osman Rm    Procedure(s) Performed: Procedure(s) (LRB):  ARTHROPLASTY-RESECTION (N/A)  DISCECTOMY, SPINE, CERVICAL, ANTERIOR APPROACH, WITH FUSION C4-5 (N/A)  FUSION, SPINE, WITH INSTRUMENTATION C4-5 (N/A)  BONE GRAFT (N/A)    Patient location: PACU    Anesthesia Type: general    Transport from OR: Transported from OR on 2-3 L/min O2 by NC with adequate spontaneous ventilation    Post pain: adequate analgesia    Post assessment: no apparent anesthetic complications    Post vital signs: stable    Level of consciousness: sedated    Nausea/Vomiting: no nausea/vomiting    Complications: none    Transfer of care protocol was followed      Last vitals:   Visit Vitals  BP (!) 115/54   Pulse 64   Temp 36.7 °C (98.1 °F)   Resp 16   Ht 5' 4" (1.626 m)   Wt 81.6 kg (180 lb)   LMP 07/31/2019   SpO2 100%   Breastfeeding? No   BMI 30.90 kg/m²     "

## 2019-08-01 NOTE — ANESTHESIA PREPROCEDURE EVALUATION
08/01/2019  Osman Rm is a 28 y.o., female.    Anesthesia Evaluation    I have reviewed the Patient Summary Reports.    I have reviewed the Nursing Notes.   I have reviewed the Medications.     Review of Systems  Anesthesia Hx:  No problems with previous Anesthesia    Pulmonary:   Asthma asymptomatic  Asthma:  last episode was > 1 year ago.        Physical Exam  General:  Well nourished    Airway/Jaw/Neck:  Airway Findings: Tongue: Normal General Airway Assessment: Adult  Mallampati: II  Improves to II with phonation.  Jaw/Neck Findings:  Neck ROM: Extension Decreased, Mod.      Dental:  Dental Findings: In tact   Chest/Lungs:  Chest/Lungs Findings: Clear to auscultation, Normal Respiratory Rate         Mental Status:  Mental Status Findings:  Cooperative, Alert and Oriented         Anesthesia Plan  Type of Anesthesia, risks & benefits discussed:  Anesthesia Type:  general  Patient's Preference:   Intra-op Monitoring Plan: standard ASA monitors  Intra-op Monitoring Plan Comments:   Post Op Pain Control Plan: multimodal analgesia  Post Op Pain Control Plan Comments:   Induction:   IV and Inhalation  Beta Blocker:  Patient is not currently on a Beta-Blocker (No further documentation required).       Informed Consent: Patient understands risks and agrees with Anesthesia plan.  Questions answered. Anesthesia consent signed with patient.  ASA Score: 2     Day of Surgery Review of History & Physical:            Ready For Surgery From Anesthesia Perspective.

## 2019-08-02 VITALS
DIASTOLIC BLOOD PRESSURE: 55 MMHG | BODY MASS INDEX: 30.73 KG/M2 | HEIGHT: 64 IN | HEART RATE: 71 BPM | TEMPERATURE: 97 F | SYSTOLIC BLOOD PRESSURE: 115 MMHG | OXYGEN SATURATION: 100 % | WEIGHT: 180 LBS | RESPIRATION RATE: 16 BRPM

## 2019-08-02 LAB
ANION GAP SERPL CALC-SCNC: 7 MMOL/L (ref 8–16)
BASOPHILS # BLD AUTO: 0.01 K/UL (ref 0–0.2)
BASOPHILS NFR BLD: 0.1 % (ref 0–1.9)
BUN SERPL-MCNC: 9 MG/DL (ref 6–20)
CALCIUM SERPL-MCNC: 8.7 MG/DL (ref 8.7–10.5)
CHLORIDE SERPL-SCNC: 104 MMOL/L (ref 95–110)
CO2 SERPL-SCNC: 25 MMOL/L (ref 23–29)
CREAT SERPL-MCNC: 0.8 MG/DL (ref 0.5–1.4)
DIFFERENTIAL METHOD: ABNORMAL
EOSINOPHIL # BLD AUTO: 0 K/UL (ref 0–0.5)
EOSINOPHIL NFR BLD: 0 % (ref 0–8)
ERYTHROCYTE [DISTWIDTH] IN BLOOD BY AUTOMATED COUNT: 13.8 % (ref 11.5–14.5)
EST. GFR  (AFRICAN AMERICAN): >60 ML/MIN/1.73 M^2
EST. GFR  (NON AFRICAN AMERICAN): >60 ML/MIN/1.73 M^2
GLUCOSE SERPL-MCNC: 135 MG/DL (ref 70–110)
HCT VFR BLD AUTO: 31.8 % (ref 37–48.5)
HGB BLD-MCNC: 10 G/DL (ref 12–16)
IMM GRANULOCYTES # BLD AUTO: 0.03 K/UL (ref 0–0.04)
LYMPHOCYTES # BLD AUTO: 1 K/UL (ref 1–4.8)
LYMPHOCYTES NFR BLD: 9.8 % (ref 18–48)
MCH RBC QN AUTO: 28.5 PG (ref 27–31)
MCHC RBC AUTO-ENTMCNC: 31.4 G/DL (ref 32–36)
MCV RBC AUTO: 91 FL (ref 82–98)
MONOCYTES # BLD AUTO: 0.2 K/UL (ref 0.3–1)
MONOCYTES NFR BLD: 2.3 % (ref 4–15)
NEUTROPHILS # BLD AUTO: 8.7 K/UL (ref 1.8–7.7)
NEUTROPHILS NFR BLD: 87.5 % (ref 38–73)
NRBC BLD-RTO: 0 /100 WBC
PLATELET # BLD AUTO: 289 K/UL (ref 150–350)
PMV BLD AUTO: 10.4 FL (ref 9.2–12.9)
POTASSIUM SERPL-SCNC: 4.1 MMOL/L (ref 3.5–5.1)
RBC # BLD AUTO: 3.51 M/UL (ref 4–5.4)
SODIUM SERPL-SCNC: 136 MMOL/L (ref 136–145)
WBC # BLD AUTO: 9.92 K/UL (ref 3.9–12.7)

## 2019-08-02 PROCEDURE — 85025 COMPLETE CBC W/AUTO DIFF WBC: CPT

## 2019-08-02 PROCEDURE — 97116 GAIT TRAINING THERAPY: CPT

## 2019-08-02 PROCEDURE — 94760 N-INVAS EAR/PLS OXIMETRY 1: CPT

## 2019-08-02 PROCEDURE — 25000003 PHARM REV CODE 250: Performed by: PHYSICIAN ASSISTANT

## 2019-08-02 PROCEDURE — 94761 N-INVAS EAR/PLS OXIMETRY MLT: CPT

## 2019-08-02 PROCEDURE — 97530 THERAPEUTIC ACTIVITIES: CPT

## 2019-08-02 PROCEDURE — 99900035 HC TECH TIME PER 15 MIN (STAT)

## 2019-08-02 PROCEDURE — 25000003 PHARM REV CODE 250: Performed by: HOSPITALIST

## 2019-08-02 PROCEDURE — 94799 UNLISTED PULMONARY SVC/PX: CPT

## 2019-08-02 PROCEDURE — 36415 COLL VENOUS BLD VENIPUNCTURE: CPT

## 2019-08-02 PROCEDURE — 97161 PT EVAL LOW COMPLEX 20 MIN: CPT

## 2019-08-02 PROCEDURE — 63600175 PHARM REV CODE 636 W HCPCS: Performed by: ANESTHESIOLOGY

## 2019-08-02 PROCEDURE — 63600175 PHARM REV CODE 636 W HCPCS: Performed by: PHYSICIAN ASSISTANT

## 2019-08-02 PROCEDURE — 80048 BASIC METABOLIC PNL TOTAL CA: CPT

## 2019-08-02 RX ORDER — OXYCODONE AND ACETAMINOPHEN 10; 325 MG/1; MG/1
1 TABLET ORAL
Qty: 40 TABLET | Refills: 0
Start: 2019-08-02 | End: 2020-01-08

## 2019-08-02 RX ORDER — ONDANSETRON HYDROCHLORIDE 8 MG/1
8 TABLET, FILM COATED ORAL
Qty: 30 TABLET | Refills: 0 | Status: SHIPPED | OUTPATIENT
Start: 2019-08-02 | End: 2020-08-19

## 2019-08-02 RX ADMIN — ONDANSETRON 8 MG: 8 TABLET, ORALLY DISINTEGRATING ORAL at 05:08

## 2019-08-02 RX ADMIN — ACETAMINOPHEN 1000 MG: 10 INJECTION, SOLUTION INTRAVENOUS at 05:08

## 2019-08-02 RX ADMIN — DIPHENHYDRAMINE HYDROCHLORIDE 25 MG: 25 LIQUID ORAL at 12:08

## 2019-08-02 RX ADMIN — DEXTROSE 2 G: 50 INJECTION, SOLUTION INTRAVENOUS at 03:08

## 2019-08-02 RX ADMIN — DEXAMETHASONE SODIUM PHOSPHATE 8 MG: 10 INJECTION INTRAMUSCULAR; INTRAVENOUS at 04:08

## 2019-08-02 RX ADMIN — MUPIROCIN 1 G: 20 OINTMENT TOPICAL at 08:08

## 2019-08-02 RX ADMIN — DOCUSATE SODIUM 100 MG: 100 CAPSULE, LIQUID FILLED ORAL at 08:08

## 2019-08-02 RX ADMIN — ACETAMINOPHEN 650 MG: 325 TABLET, FILM COATED ORAL at 02:08

## 2019-08-02 RX ADMIN — OXYCODONE HYDROCHLORIDE 5 MG: 5 TABLET ORAL at 06:08

## 2019-08-02 RX ADMIN — DEXAMETHASONE SODIUM PHOSPHATE 8 MG: 10 INJECTION INTRAMUSCULAR; INTRAVENOUS at 11:08

## 2019-08-02 RX ADMIN — DEXTROSE 2 G: 50 INJECTION, SOLUTION INTRAVENOUS at 11:08

## 2019-08-02 RX ADMIN — Medication: at 05:08

## 2019-08-02 RX ADMIN — DIPHENHYDRAMINE HYDROCHLORIDE 25 MG: 25 LIQUID ORAL at 08:08

## 2019-08-02 NOTE — PROGRESS NOTES
[facility]  Orthopedics  Progress Note    Patient Name: Osman Rm  MRN: [id number]  Admission Date: 8/1/2019  Hospital Length of Stay: 1 days  Attending Provider: Andrew Brennan MD  Primary Care Provider: Annamarie Garcia NP  Follow-up For: Procedure(s) (LRB):  ARTHROPLASTY-RESECTION (N/A)  DISCECTOMY, SPINE, CERVICAL, ANTERIOR APPROACH, WITH FUSION C4-5 (N/A)  FUSION, SPINE, WITH INSTRUMENTATION C4-5 (N/A)  BONE GRAFT (N/A)    Post-Operative Day: 1 Day Post-Op  Subjective:     Principal Problem:Pain due to internal orthopedic prosthetic device    Principal Orthopedic Problem: S/P ACDF    Interval History: groggy; sore throat    Review of patient's allergies indicates:   Allergen Reactions    Adhesive tape-silicones Hives       Current Facility-Administered Medications   Medication    acetaminophen tablet 650 mg    albuterol sulfate nebulizer solution 2.5 mg    aluminum-magnesium hydroxide-simethicone 200-200-20 mg/5 mL suspension 30 mL    bisacodyl suppository 10 mg    ceFAZolin (ANCEF) 2 g in dextrose 5 % 50 mL IVPB    dexamethasone (DECADRON) 8 mg in sodium chloride 0.9% IVPB    diphenhydrAMINE 12.5 mg/5 mL liquid 25 mg    docusate sodium capsule 100 mg    hydromorphone (PF) injection 2 mg    HYDROmorphone injection 1 mg    meclizine tablet 25 mg    mupirocin 2 % ointment 1 g    naloxone 0.4 mg/mL injection 0.02 mg    ondansetron disintegrating tablet 8 mg    oxyCODONE immediate release tablet 15 mg    oxyCODONE immediate release tablet 5 mg    oxyCODONE immediate release tablet Tab 10 mg    promethazine (PHENERGAN) 6.25 mg in dextrose 5 % 50 mL IVPB    ramelteon tablet 8 mg    senna-docusate 8.6-50 mg per tablet 2 tablet     Objective:     Vital Signs (Most Recent):  Temp: 97.9 °F (36.6 °C) (08/02/19 0750)  Pulse: 62 (08/02/19 0750)  Resp: 16 (08/02/19 0750)  BP: (!) 116/55 (08/02/19 0750)  SpO2: 97 % (08/02/19 0808) Vital Signs (24h Range):  Temp:  [97.9 °F (36.6  "°C)-98.5 °F (36.9 °C)] 97.9 °F (36.6 °C)  Pulse:  [62-98] 62  Resp:  [0-38] 16  SpO2:  [97 %-100 %] 97 %  BP: (106-135)/(53-67) 116/55     Weight: 81.6 kg (180 lb)  Height: 5' 4" (162.6 cm)  Body mass index is 30.9 kg/m².      Intake/Output Summary (Last 24 hours) at 8/2/2019 0825  Last data filed at 8/2/2019 0627  Gross per 24 hour   Intake 2729 ml   Output 1715 ml   Net 1014 ml       General    Nursing note and vitals reviewed.  Constitutional: She is oriented to person, place, and time. She appears well-developed and well-nourished.   Pulmonary/Chest: Effort normal.   Neurological: She is alert and oriented to person, place, and time.   Psychiatric: She has a normal mood and affect. Her behavior is normal.         Back (L-Spine & T-Spine) / Neck (C-Spine) Exam     Comments:  Both incisions look good. KAYLEE drains removed. BUEs DNVI        Significant Labs:   CBC:   Recent Labs   Lab 08/01/19  1318 08/02/19  0455   WBC 8.50 9.92   HGB 10.5* 10.0*   HCT 34.0* 31.8*    289     CMP:   Recent Labs   Lab 08/01/19  1317 08/02/19  0454    136   K 4.1 4.1    104   CO2 22* 25    135*   BUN 9 9   CREATININE 0.8 0.8   CALCIUM 8.9 8.7   ANIONGAP 6* 7*   EGFRNONAA >60 >60     All pertinent labs within the past 24 hours have been reviewed.    Significant Imaging: None    Assessment/Plan:     * Pain due to internal orthopedic prosthetic device  Needs to get OOB and ambulate - nursing and PT  DC home this afternoon; no HH needed          JOSS ANDRES  Orthopedics  [facility]  "

## 2019-08-02 NOTE — NURSING
Discharge instructions provided, patient verbalized understanding. PIV removed intact, bleeding controlled, tolerated well. Personal belongings packed per per patient and family. Walker sent home with patient per PT request. Transported off floor via wheelchair to personal vehicle home.

## 2019-08-02 NOTE — PLAN OF CARE
Problem: Physical Therapy Goal  Goal: Physical Therapy Goal  Goals to be met by: 19     Patient will increase functional independence with mobility by performin. Supine to sit with Modified [place]  2. Sit to stand transfer with Modified [place]  3. Bed to chair transfer with Modified [place] using no AD  4. Gait  x 250 feet with Modified [place] using Rolling Walker.   5. Lower extremity exercise program x 30 reps per handout, with supervision    Outcome: Ongoing (interventions implemented as appropriate)  PT for functional mob training and/ or ex; pt educated on spinal and safety precautions

## 2019-08-02 NOTE — PLAN OF CARE
PCP is Aparna Davila.  Lives with spouse; independent with ADL's.  Denies HH/DME.  Pharmacy is [facility] on [facility] [facility] [facility].  Patient states she has [employer]; informed [facility], and Adelina in Admit.  Discharge plan is home; no needs.    Per Nia in Admit: She does but her procedure is being handled through a third party, so they are not using her private insurance       08/02/19 1154   Discharge Assessment   Assessment Type Discharge Planning Assessment   Confirmed/corrected address and phone number on facesheet? Yes   Assessment information obtained from? Patient;Medical Record   Prior to hospitilization cognitive status: Alert/Oriented   Prior to hospitalization functional status: Independent   Current cognitive status: Alert/Oriented   Current Functional Status: Independent   Lives With spouse   Able to Return to Prior Arrangements yes   Is patient able to care for self after discharge? Yes   Patient's perception of discharge disposition home or selfcare   Readmission Within the Last 30 Days no previous admission in last 30 days   Patient currently being followed by outpatient case management? No   Patient currently receives any other outside agency services? No   Equipment Currently Used at Home none   Do you have any problems affording any of your prescribed medications? No   Is the patient taking medications as prescribed? yes   Does the patient have transportation home? Yes   Transportation Anticipated family or friend will provide   Dialysis Name and Scheduled days none   Does the patient receive services at the [facility]mad[facility]? No   Discharge Plan A Home   DME Needed Upon Discharge  none   Patient/Family in Agreement with Plan yes

## 2019-08-02 NOTE — PLAN OF CARE
Problem: Adult Inpatient Plan of Care  Goal: Plan of Care Review  Outcome: Ongoing (interventions implemented as appropriate)  Pt remained free from fall or injury during shift.  VSS, pain was moderately controlled with Dilaudid PCA pump in use.  Mild nausea and itching reported with PRN medications given with moderate relief obtained.  Pt was cautiously advanced to a full liquid diet for breakfast this morning. She is able to swallow w/o difficulty but c/o burning.  Foely catheter draining clear yellow urine during shift and to come out this a.m.  Pt to transition to PO PRN pain medication this a.m. With the discontinuation of PCA pump @ 0600.  KAYLEE drains in place x2 with little to no drainage.  KAYLEE drains to be removed this a.m. And surgical dressings to be changed.  POC was discussed with the pt and her mother who remained at the bedside throughout the shift.  Pt and her mother verbalized an understanding.  Bedside rails are raised x3 with call bell and bedside table with personal belongings within reach. Nurse rounded hourly until 0000 then q2 hrs to ensure pt safety.

## 2019-08-02 NOTE — PLAN OF CARE
Problem: Adult Inpatient Plan of Care  Goal: Plan of Care Review  Outcome: Outcome(s) achieved Date Met: 08/01/19  Patient AAO X 4,lethargic. PCA pump in use. KAYLEE drains intact . IV fluids infusing. Clear liquid diet in place. Tolerating well with mild pain when swallowing. Dressings clean dry and intact [place] intact. Call light and personal items within reach. Patient reports understanding of plan of care. Family at bedside.

## 2019-08-02 NOTE — PLAN OF CARE
Permission given by Jessica TILLMAN with [facility] to pull walker from depot, and that patient can be billed.  Delivered walker to patient, and informed that she will be billed $67.12; verbalized understanding.       08/02/19 1293   Post-Acute Status   Post-Acute Authorization [facility] Status Set-up Complete

## 2019-08-02 NOTE — PLAN OF CARE
08/02/19 0808   PRE-TX-O2   O2 Device (Oxygen Therapy) room air   SpO2 97 %   Pulse Oximetry Type Intermittent   $ Pulse Oximetry - Multiple Charge Pulse Oximetry - Multiple   Aerosol Therapy   $ Aerosol Therapy Charges PRN treatment not required   Incentive Spirometer   $ Incentive Spirometer Charges done with encouragement   Administration (IS) instruction provided, follow-up   Number of Repetitions (IS) 10   Level Incentive Spirometer (mL) 1000   Patient Tolerance (IS) fair

## 2019-08-02 NOTE — PT/OT/SLP EVAL
"Physical Therapy Evaluation    Patient Name:  Osman Rm   MRN:  [id number]    Recommendations:     Discharge Recommendations:  (TBD, likely HHPT)   Discharge Equipment Recommendations: (TBD, possibly RW)   Barriers to discharge: fair motivation to get up    Assessment:     Osman Rm is a 28 y.o. female admitted with a medical diagnosis of Pain due to internal orthopedic prosthetic device.  She presents with the following impairments/functional limitations:  weakness, gait instability, decreased upper extremity function, impaired cardiopulmonary response to activity, impaired endurance, impaired balance, decreased lower extremity function, impaired coordination, pain, impaired self care skills, impaired functional mobilty, impaired muscle length, impaired joint extensibility, orthopedic precautions; pt c/o nauseated and vomited while ambulating with PT; mother is very helpful and cooperative.    Rehab Prognosis: Fair; patient would benefit from acute skilled PT services to address these deficits and reach maximum level of function.    Recent Surgery: Procedure(s) (LRB):  ARTHROPLASTY-RESECTION (N/A)  DISCECTOMY, SPINE, CERVICAL, ANTERIOR APPROACH, WITH FUSION C4-5 (N/A)  FUSION, SPINE, WITH INSTRUMENTATION C4-5 (N/A)  BONE GRAFT (N/A) 1 Day Post-Op    Plan:     During this hospitalization, patient to be seen daily/BID to address the identified rehab impairments via gait training, therapeutic activities, therapeutic exercises and progress toward the following goals:    · Plan of Care Expires:  08/16/19    Subjective     Chief Complaint: "feels sleepy, not feeling well and dizziness"  Patient/Family Comments/goals: needed to be encouraged to get up, walk and do more by the PT and her mother; mother wanted pt to wake up and walk  Pain/Comfort:  · Pain Rating 1: 4/10  · Location - Side 1: Bilateral  · Location - Orientation 1: generalized  · Location 1: neck  · Pain Addressed 1: Pre-medicate for activity, " Reposition, Nurse notified  · Pain Rating Post-Intervention 1: 4/10    Patients cultural, spiritual, Bahai conflicts given the current situation: no    Living Environment:  Lives with  and daughter in a 1-dylon house with no steps  Prior to admission, patients level of function was (I) with no AD, drives a vehicle, active and works.  Equipment used at home: none.  DME owned (not currently used): none.  Upon discharge, patient will have assistance from family.    Objective:     Communicated with ROSIO Hartman prior to session.  Patient found HOB elevated with peripheral IV  upon PT entry to room.    General Precautions: Standard, fall   Orthopedic Precautions:spinal precautions   Braces: Cervical collar(cervical soft collar)     Exams:  · Cognitive Exam:  Patient is oriented to Person, Place, Time and Situation  · Gross Motor Coordination:  WFL  · Sensation:    · -       Intact  · Skin Integrity/Edema:      · -       S/P Cervical discectomy and fusion, with soft cervical collar  · RLE ROM: WFL  · RLE Strength: WFL  · LLE ROM: WFL  · LLE Strength: WFL    Functional Mobility:  · Bed Mobility:     · Rolling Right: modified independence  · Supine to Sit: minimum assistance  · Transfers:     · Sit to Stand:  minimum assistance with rolling walker  · Gait: 50 ft with a RW, CGA, slow-paced, min kyphotic, head down 90% of the time  · Balance: Sitting: G/G; Standing: G/F      Therapeutic Activities and Exercises:  Functional mobility training as above; pt and mother educated on safety and spinal precautions and mobility techniques, as well as the role and benefits of PT and mobilization.    AM-PAC 6 CLICK MOBILITY  Total Score:20     Patient left HOB elevated with all lines intact, call button in reach, RN notified and mother and RN present.    GOALS:   Multidisciplinary Problems     Physical Therapy Goals        Problem: Physical Therapy Goal    Goal Priority Disciplines Outcome Goal Variances Interventions    Physical Therapy Goal     PT, PT/OT Ongoing (interventions implemented as appropriate)     Description:  Goals to be met by: 19     Patient will increase functional independence with mobility by performin. Supine to sit with Modified [place]  2. Sit to stand transfer with Modified [place]  3. Bed to chair transfer with Modified [place] using no AD  4. Gait  x 250 feet with Modified [place] using Rolling Walker.   5. Lower extremity exercise program x 30 reps per handout, with supervision                      History:     Past Medical History:   Diagnosis Date    Asthma        Past Surgical History:   Procedure Laterality Date    DECOMPRESSION N/A 2018    Performed by Andrew Brennan MD at [facility] OR    DISCECTOMY, SPINE, CERVICAL, ANTERIOR APPROACH, ARTHROPLASTY C4-5 N/A 2018    Performed by Andrew Brennan MD at [facility] OR    medial branch block      SPINAL FUSION      TONSILLECTOMY         Time Tracking:     PT Received On: 19  PT Start Time: 1015     PT Stop Time: 1043  PT Total Time (min): 28 min     Billable Minutes: Evaluation 12 and Gait Training 13      Jarrod Mccurdy, PT  2019

## 2019-08-02 NOTE — SUBJECTIVE & OBJECTIVE
"Principal Problem:Pain due to internal orthopedic prosthetic device    Principal Orthopedic Problem: S/P ACDF    Interval History: groggy; sore throat    Review of patient's allergies indicates:   Allergen Reactions    Adhesive tape-silicones Hives       Current Facility-Administered Medications   Medication    acetaminophen tablet 650 mg    albuterol sulfate nebulizer solution 2.5 mg    aluminum-magnesium hydroxide-simethicone 200-200-20 mg/5 mL suspension 30 mL    bisacodyl suppository 10 mg    ceFAZolin (ANCEF) 2 g in dextrose 5 % 50 mL IVPB    dexamethasone (DECADRON) 8 mg in sodium chloride 0.9% IVPB    diphenhydrAMINE 12.5 mg/5 mL liquid 25 mg    docusate sodium capsule 100 mg    hydromorphone (PF) injection 2 mg    HYDROmorphone injection 1 mg    meclizine tablet 25 mg    mupirocin 2 % ointment 1 g    naloxone 0.4 mg/mL injection 0.02 mg    ondansetron disintegrating tablet 8 mg    oxyCODONE immediate release tablet 15 mg    oxyCODONE immediate release tablet 5 mg    oxyCODONE immediate release tablet Tab 10 mg    promethazine (PHENERGAN) 6.25 mg in dextrose 5 % 50 mL IVPB    ramelteon tablet 8 mg    senna-docusate 8.6-50 mg per tablet 2 tablet     Objective:     Vital Signs (Most Recent):  Temp: 97.9 °F (36.6 °C) (08/02/19 0750)  Pulse: 62 (08/02/19 0750)  Resp: 16 (08/02/19 0750)  BP: (!) 116/55 (08/02/19 0750)  SpO2: 97 % (08/02/19 0808) Vital Signs (24h Range):  Temp:  [97.9 °F (36.6 °C)-98.5 °F (36.9 °C)] 97.9 °F (36.6 °C)  Pulse:  [62-98] 62  Resp:  [0-38] 16  SpO2:  [97 %-100 %] 97 %  BP: (106-135)/(53-67) 116/55     Weight: 81.6 kg (180 lb)  Height: 5' 4" (162.6 cm)  Body mass index is 30.9 kg/m².      Intake/Output Summary (Last 24 hours) at 8/2/2019 0825  Last data filed at 8/2/2019 0627  Gross per 24 hour   Intake 2729 ml   Output 1715 ml   Net 1014 ml       General    Nursing note and vitals reviewed.  Constitutional: She is oriented to person, place, and time. She appears " well-developed and well-nourished.   Pulmonary/Chest: Effort normal.   Neurological: She is alert and oriented to person, place, and time.   Psychiatric: She has a normal mood and affect. Her behavior is normal.         Back (L-Spine & T-Spine) / Neck (C-Spine) Exam     Comments:  Both incisions look good. KAYLEE drains removed. BUEs DNVI        Significant Labs:   CBC:   Recent Labs   Lab 08/01/19  1318 08/02/19  0455   WBC 8.50 9.92   HGB 10.5* 10.0*   HCT 34.0* 31.8*    289     CMP:   Recent Labs   Lab 08/01/19  1317 08/02/19  0454    136   K 4.1 4.1    104   CO2 22* 25    135*   BUN 9 9   CREATININE 0.8 0.8   CALCIUM 8.9 8.7   ANIONGAP 6* 7*   EGFRNONAA >60 >60     All pertinent labs within the past 24 hours have been reviewed.    Significant Imaging: None

## 2019-08-02 NOTE — NURSING
Pt's huffman catheter was removed per MD order. A BSC was placed near pt's bedside. Pt and mother were educated that the nurse would be monitoring pt for her next void over the next 6 hrs.  Pt and mother verbalized an understanding.  PCA pump was d/c'ed at this time as well.  Pt and her mother were educated on her new PRN pain regimen going forward.  Pt was given a 5 mg oxy IR at this time as well.  Pt's white board was updated with the next available dose of PRN pain medication as well as the names of the new pain medications she will be taking PRN.  No questions or concerns were voiced at this time.  Nurse to return to remove pt's KAYLEE drains per MD order.

## 2019-08-02 NOTE — PLAN OF CARE
08/02/19 1157   Final Note   Assessment Type Final Discharge Note   Anticipated Discharge Disposition Home   Hospital Follow Up  Appt(s) scheduled? Yes

## 2019-08-03 NOTE — HOSPITAL COURSE
"Dr. Brennan performed surgery:  "Removal of failed cervical disc arthroplasty C4-5 anterior cervical diskectomy and fusion C4-5 with iliac crest bone graft, insertion of interbody device C4-5, anterior plate fixation C4 through C5."  She was admitted for a short time afterward for pain control.  Physical therapy evaluated her and treated her.  She did well in post-op course and was discharged home in good condition.  "

## 2019-08-03 NOTE — DISCHARGE SUMMARY
"[facility] Hospital Medicine  Discharge Summary      Patient Name: Osman Rm  MRN: [id number]  Admission Date: 8/1/2019  Hospital Length of Stay: 1 days  Discharge Date and Time: 8/2/2019  4:06 PM  Attending Physician: No att. providers found   Discharging Provider: Wellington Freeman MD  Primary Care Provider: Aparna Garcia MD      HPI:   No notes on file    Procedure(s) (LRB):  ARTHROPLASTY-RESECTION (N/A)  DISCECTOMY, SPINE, CERVICAL, ANTERIOR APPROACH, WITH FUSION C4-5 (N/A)  FUSION, SPINE, WITH INSTRUMENTATION C4-5 (N/A)  BONE GRAFT (N/A)      Hospital Course:   Dr. Brennan performed surgery:  "Removal of failed cervical disc arthroplasty C4-5 anterior cervical diskectomy and fusion C4-5 with iliac crest bone graft, insertion of interbody device C4-5, anterior plate fixation C4 through C5."  She was admitted for a short time afterward for pain control.  Physical therapy evaluated her and treated her.  She did well in post-op course and was discharged home in good condition.     Consults:     No new Assessment & Plan notes have been filed under this hospital service since the last note was generated.  Service: Hospital Medicine    Final Active Diagnoses:    Diagnosis Date Noted POA    PRINCIPAL PROBLEM:  Pain due to internal orthopedic prosthetic device [T84.84XA] 08/01/2019 Yes      Problems Resolved During this Admission:       Discharged Condition: good    Disposition: Home or Self Care    Follow Up:  Follow-up Information     Keep your appointment with Dr. Brennan on Aug 14th..               Patient Instructions:      WALKER FOR HOME USE     Order Specific Question Answer Comments   Type of Walker: Adult (5'4"-6'6")    With wheels? Yes    Height: 5' 4" (1.626 m)    Weight: 81.6 kg (180 lb)    Length of need (1-99 months): 3    Does patient have medical equipment at home? none    Please check all that apply: Patient's condition impairs ambulation.    Please check all that apply: " Patient is unable to safely ambulate without equipment.    Please check all that apply: Walker will be used for gait training.      Diet Adult Regular     Activity as tolerated       Significant Diagnostic Studies:   Lab Results   Component Value Date    WBC 9.92 08/02/2019    HGB 10.0 (L) 08/02/2019    HCT 31.8 (L) 08/02/2019    MCV 91 08/02/2019     08/02/2019     BMP  Lab Results   Component Value Date     08/02/2019    K 4.1 08/02/2019     08/02/2019    CO2 25 08/02/2019    BUN 9 08/02/2019    CREATININE 0.8 08/02/2019    CALCIUM 8.7 08/02/2019    ANIONGAP 7 (L) 08/02/2019    ESTGFRAFRICA >60 08/02/2019    EGFRNONAA >60 08/02/2019         Pending Diagnostic Studies:     None         Medications:  Reconciled Home Medications:      Medication List      START taking these medications    ondansetron 8 MG tablet  Commonly known as:  ZOFRAN  Take 1 tablet (8 mg total) by mouth every 6 to 8 hours as needed for Nausea.     oxyCODONE-acetaminophen  mg per tablet  Commonly known as:  PERCOCET  Take 1 tablet by mouth every 4 to 6 hours as needed for Pain. Script written by Dr. Brennan.        CONTINUE taking these medications    meclizine 25 mg tablet  Commonly known as:  ANTIVERT  TK ONE T PO TID PRF DIZZINESS FOR UP TO 10 DAYS     PROAIR HFA INHL  Inhale into the lungs.     promethazine 12.5 MG Tab  Commonly known as:  PHENERGAN  TK ONE T PO Q 6 H PRF NAUSEA            Indwelling Lines/Drains at time of discharge:   Lines/Drains/Airways          None          Time spent on the discharge of patient: 24 minutes  Patient was seen and examined on the date of discharge and determined to be suitable for discharge.         Wellington Freeman MD  Department of Hospital Medicine  [facility]

## 2019-08-07 ENCOUNTER — TELEPHONE (OUTPATIENT)
Dept: ORTHOPEDICS | Facility: CLINIC | Age: 29
End: 2019-08-07

## 2019-08-07 NOTE — TELEPHONE ENCOUNTER
----- Message from Laura Luu sent at 8/7/2019  2:15 PM CDT -----  Contact: patient  Patient was calling to see if we completed her FMLA paperwork and if so when will it be ready, call her back at [phone].

## 2019-08-07 NOTE — TELEPHONE ENCOUNTER
----- Message from Mago Friedman LPN sent at 8/7/2019  5:35 PM CDT -----  Contact: patient  Paperwork was done and faxed yesterday per mrs flynn.  ----- Message -----  From: Laura Luu  Sent: 8/7/2019   2:15 PM  To: Mago Friedman LPN    Patient was calling to see if we completed her FMLA paperwork and if so when will it be ready, call her back at [phone].

## 2019-08-14 ENCOUNTER — OFFICE VISIT (OUTPATIENT)
Dept: ORTHOPEDICS | Facility: CLINIC | Age: 29
End: 2019-08-14
Payer: COMMERCIAL

## 2019-08-14 VITALS
SYSTOLIC BLOOD PRESSURE: 96 MMHG | HEIGHT: 64 IN | DIASTOLIC BLOOD PRESSURE: 62 MMHG | HEART RATE: 94 BPM | BODY MASS INDEX: 29.02 KG/M2 | WEIGHT: 170 LBS

## 2019-08-14 DIAGNOSIS — M50.121 CERVICAL DISC DISORDER AT C4-C5 LEVEL WITH RADICULOPATHY: Primary | ICD-10-CM

## 2019-08-14 DIAGNOSIS — M50.121 CERVICAL DISC DISORDER AT C4-C5 LEVEL WITH RADICULOPATHY: ICD-10-CM

## 2019-08-14 DIAGNOSIS — Z98.1 STATUS POST CERVICAL SPINAL FUSION: ICD-10-CM

## 2019-08-14 PROCEDURE — 20551 NJX 1 TENDON ORIGIN/INSJ: CPT | Mod: 58,S$GLB,, | Performed by: ORTHOPAEDIC SURGERY

## 2019-08-14 PROCEDURE — 99024 POSTOP FOLLOW-UP VISIT: CPT | Mod: S$GLB,,, | Performed by: ORTHOPAEDIC SURGERY

## 2019-08-14 PROCEDURE — 20551 TENDON ORIGIN: ICD-10-PCS | Mod: 58,S$GLB,, | Performed by: ORTHOPAEDIC SURGERY

## 2019-08-14 PROCEDURE — 99024 PR POST-OP FOLLOW-UP VISIT: ICD-10-PCS | Mod: S$GLB,,, | Performed by: ORTHOPAEDIC SURGERY

## 2019-08-14 RX ORDER — PROMETHAZINE HYDROCHLORIDE 12.5 MG/1
TABLET ORAL
COMMUNITY
Start: 2019-04-05 | End: 2019-08-14

## 2019-08-14 RX ORDER — METHYLPREDNISOLONE ACETATE 40 MG/ML
40 INJECTION, SUSPENSION INTRA-ARTICULAR; INTRALESIONAL; INTRAMUSCULAR; SOFT TISSUE
Status: DISCONTINUED | OUTPATIENT
Start: 2019-08-14 | End: 2019-08-14 | Stop reason: HOSPADM

## 2019-08-14 RX ORDER — TRAMADOL HYDROCHLORIDE 50 MG/1
50 TABLET ORAL EVERY 6 HOURS PRN
Qty: 28 TABLET | Refills: 3 | Status: SHIPPED | OUTPATIENT
Start: 2019-08-14 | End: 2020-01-08

## 2019-08-14 RX ORDER — MECLIZINE HYDROCHLORIDE 25 MG/1
TABLET ORAL
COMMUNITY
Start: 2019-04-05 | End: 2019-08-14

## 2019-08-14 RX ADMIN — METHYLPREDNISOLONE ACETATE 40 MG: 40 INJECTION, SUSPENSION INTRA-ARTICULAR; INTRALESIONAL; INTRAMUSCULAR; SOFT TISSUE at 01:08

## 2019-08-14 NOTE — PROCEDURES
Tendon Origin  Date/Time: 8/14/2019 1:24 PM  Performed by: Andrew Brennan MD  Authorized by: Andrew Brennan MD     Consent Done?:  Yes (Verbal)  Timeout: prior to procedure the correct patient, procedure, and site was verified    Indications:  Pain  Site marked: the procedure site was marked    Timeout: prior to procedure the correct patient, procedure, and site was verified    Location: IM right gluteus.  Prep: patient was prepped and draped in usual sterile fashion    Needle size:  25 G  Medications:  40 mg methylPREDNISolone acetate 40 mg/mL; 40 mg methylPREDNISolone acetate 40 mg/mL  Patient tolerance:  Patient tolerated the procedure well with no immediate complications

## 2019-08-14 NOTE — PROGRESS NOTES
Subjective:    Patient ID: Osman Rm is a 29 y.o. female.    Chief Complaint: Post-op Evaluation of the Neck (ATTY  C4-5 ACDF /implant removal 8-1-19. She has neck soreness and trouble swallowing)      History of Present Illness  Patient is here for two-week postoperative appointment status post C4-5 revision of total disc arthroplasty and fusion. She has had a sore throat and a hoarse voice since the surgery    Current Medications  Current Outpatient Medications   Medication Sig Dispense Refill    ondansetron (ZOFRAN) 8 MG tablet Take 1 tablet (8 mg total) by mouth every 6 to 8 hours as needed for Nausea. 30 tablet 0    oxyCODONE-acetaminophen (PERCOCET)  mg per tablet Take 1 tablet by mouth every 4 to 6 hours as needed for Pain. Script written by Dr. Brennan. 40 tablet 0     No current facility-administered medications for this visit.        Allergies  Review of patient's allergies indicates:   Allergen Reactions    Adhesive tape-silicones Hives       Past Medical History  Past Medical History:   Diagnosis Date    Asthma        Surgical History  Past Surgical History:   Procedure Laterality Date    ARTHROPLASTY-RESECTION N/A 8/1/2019    Performed by [facility] at [facility]    BONE GRAFT N/A 8/1/2019    [facility]    DECOMPRESSION N/A 6/28/2018    [facility] at [facility]    DISCECTOMY, SPINE, CERVICAL, ANTERIOR APPROACH, ARTHROPLASTY C4-5 N/A 6/28/2018    [facility] at [facility]    DISCECTOMY, SPINE, CERVICAL, ANTERIOR APPROACH, WITH FUSION C4-5 N/A 8/1/2019    [facility] by [facility] at [facility]    FUSION, SPINE, WITH INSTRUMENTATION C4-5 N/A 8/1/2019    [facility] by [facility] at [facility]    medial branch block      SPINAL FUSION      TONSILLECTOMY         Family History:   History reviewed. No pertinent family history.    Social History:   Social History     Socioeconomic History    Marital  status:      Spouse name: Not on file    Number of children: Not on file    Years of education: Not on file    Highest education level: Not on file   Occupational History    Not on file   Social Needs    Financial resource strain: Not on file    Food insecurity:     Worry: Not on file     Inability: Not on file    Transportation needs:     Medical: Not on file     Non-medical: Not on file   Tobacco Use    Smoking status: Never Smoker    Smokeless tobacco: Never Used   Substance and Sexual Activity    Alcohol use: No    Drug use: No    Sexual activity: Yes     Partners: Male     Birth control/protection: Abstinence   Lifestyle    Physical activity:     Days per week: Not on file     Minutes per session: Not on file    Stress: Not on file   Relationships    Social connections:     Talks on phone: Not on file     Gets together: Not on file     Attends Anglican service: Not on file     Active member of club or organization: Not on file     Attends meetings of clubs or organizations: Not on file     Relationship status: Not on file   Other Topics Concern    Not on file   Social History Narrative    Not on file       Date of surgery:     Review of Systems     General/Constitutional: Chills denies. Fatigue denies. Fever denies. Weight gain denies. Weight loss denies.    Musculoskeletal: Comments: See HPI for details    Skin: Rash denies.    Objective:   Vital Signs:   Vitals:    08/14/19 1244   BP: 96/62   Pulse: 94        Physical Exam    This a well-developed, well nourished patient in no acute distress.  They are alert and oriented and cooperative to examination.  Pt. walks without an antalgic gait.      General Examination:     Constitutional: The patient is alert and oriented to lace person and time. Mood is pleasant.     Head/Face: Normal facial features normal eyebrows    Eyes: Normal extraocular motion bilaterally    Lungs: Respirations are equal and unlabored    Gait is  "coordinated.    Cardiovascular: There are no swelling or varicosities present.    Lymphatic: Negative for adenopathy    Skin: Normal    Neurological: Level of consciousness normal. Oriented to place person and time and situation    Psychiatric: Oriented to time place person and situation    Her vehicle exam: Anterior cervical incision and right iliac crest incision healing well with no signs or symptoms of infection. Staples removed from the hip incision. Cervical range of motion is limited and guarded secondary to postoperative pain. Patient has a forward head posture. Bilateral upper extremities distal neurovascular intact.    XRAY Report/ Interpretation: Postop cervical AP and lateral x-rays taken in the office today reviewed the patient demonstrated a normal postoperative appearance      Assessment:       1. Cervical disc disorder at C4-C5 level with radiculopathy s/p C4-5 Cervical spine disectomy, arthroplasty and decompression        Plan:       Osman was seen today for post-op evaluation.    Diagnoses and all orders for this visit:    Cervical disc disorder at C4-C5 level with radiculopathy s/p C4-5 Cervical spine disectomy, arthroplasty and decompression  -     X-Ray Cervical Spine AP And Lateral         No follow-ups on file.  Ritesh Fuentes, physician's assistant served in the capacity as a "scribe" for this patient encounter  A "face to face" encounter occurred with Dr. Brennan on this date  The treatment plan and medical decision making is outlined below:  Patient was given IM Depo-Medrol 80 mg today to help with the sore throat and dysphagia. Prescribed Magic mouthwash to help with this as well. Secondary to limited range of motion and a lot of guarding she was referred to start outpatient physical therapy as soon as possible. Discontinue Percocet and transition to tramadol      This note was created using Dragon voice recognition software that occasionally misinterpreted phrases or words.     "

## 2019-09-09 ENCOUNTER — OFFICE VISIT (OUTPATIENT)
Dept: ORTHOPEDICS | Facility: CLINIC | Age: 29
End: 2019-09-09
Payer: COMMERCIAL

## 2019-09-09 VITALS
DIASTOLIC BLOOD PRESSURE: 64 MMHG | HEIGHT: 64 IN | WEIGHT: 168 LBS | HEART RATE: 64 BPM | SYSTOLIC BLOOD PRESSURE: 96 MMHG | BODY MASS INDEX: 28.68 KG/M2

## 2019-09-09 DIAGNOSIS — M50.121 CERVICAL DISC DISORDER AT C4-C5 LEVEL WITH RADICULOPATHY: ICD-10-CM

## 2019-09-09 DIAGNOSIS — M50.121 CERVICAL DISC DISORDER AT C4-C5 LEVEL WITH RADICULOPATHY: Primary | ICD-10-CM

## 2019-09-09 DIAGNOSIS — R13.10 DYSPHAGIA, UNSPECIFIED TYPE: ICD-10-CM

## 2019-09-09 PROCEDURE — 99024 PR POST-OP FOLLOW-UP VISIT: ICD-10-PCS | Mod: S$GLB,,, | Performed by: ORTHOPAEDIC SURGERY

## 2019-09-09 PROCEDURE — 99024 POSTOP FOLLOW-UP VISIT: CPT | Mod: S$GLB,,, | Performed by: ORTHOPAEDIC SURGERY

## 2019-09-09 NOTE — LETTER
September 9, 2019      [facility]  [address]  [place] [address]  Phone: [phone]  Fax: [phone]       Patient: Osman Rm   YOB: 1990  Date of Visit: 09/09/2019    To Whom It May Concern:    Janie Rm  was at our office on 09/09/2019. She may return to work on 10/01/2019 contingent on her follow up appointment with us. If you have any questions or concerns, or if I can be of further assistance, please do not hesitate to contact me.    Sincerely,    Andrew Brennan MD

## 2019-09-09 NOTE — PROGRESS NOTES
Subjective:    Patient ID: Osman Rm is a 29 y.o. female.    Chief Complaint: Post-op Evaluation of the Neck (//XRAY/C4-5 ACDF /implant removal 3 week follow up. Neck pain hurts when she lays down and sits. She has trouble swallowing and states it is hard to breath constantly. She has trouble with the tone of her voice being very low. Still right hip milad)      History of Present Illness  Status post anterior cervical fusion. Patient still has some neck pain and difficulty with swallowing. She claims that the tone over her voice is low.    Current Medications  Current Outpatient Medications   Medication Sig Dispense Refill    ondansetron (ZOFRAN) 8 MG tablet Take 1 tablet (8 mg total) by mouth every 6 to 8 hours as needed for Nausea. 30 tablet 0    oxyCODONE-acetaminophen (PERCOCET)  mg per tablet Take 1 tablet by mouth every 4 to 6 hours as needed for Pain. Script written by Dr. Brennan. 40 tablet 0    traMADol (ULTRAM) 50 mg tablet Take 1 tablet (50 mg total) by mouth every 6 (six) hours as needed for Pain. 28 tablet 3     No current facility-administered medications for this visit.        Allergies  Review of patient's allergies indicates:   Allergen Reactions    Adhesive tape-silicones Hives       Past Medical History  Past Medical History:   Diagnosis Date    Asthma        Surgical History  Past Surgical History:   Procedure Laterality Date    ARTHROPLASTY-RESECTION N/A 8/1/2019    [facility]    BONE GRAFT N/A 8/1/2019    [facility]    DECOMPRESSION N/A 6/28/2018    [facility]    DISCECTOMY, SPINE, CERVICAL, ANTERIOR APPROACH, ARTHROPLASTY C4-5 N/A 6/28/2018    [facility]    DISCECTOMY, SPINE, CERVICAL, ANTERIOR APPROACH, WITH FUSION C4-5 N/A 8/1/2019    [facility]    FUSION, SPINE, WITH INSTRUMENTATION C4-5 N/A  8/1/2019    Performed by Andrew Brennan MD at [facility] OR    medial branch block      SPINAL FUSION      TONSILLECTOMY         Family History:   History reviewed. No pertinent family history.    Social History:   Social History     Socioeconomic History    Marital status:      Spouse name: Not on file    Number of children: Not on file    Years of education: Not on file    Highest education level: Not on file   Occupational History    Not on file   Social Needs    Financial resource strain: Not on file    Food insecurity:     Worry: Not on file     Inability: Not on file    Transportation needs:     Medical: Not on file     Non-medical: Not on file   Tobacco Use    Smoking status: Never Smoker    Smokeless tobacco: Never Used   Substance and Sexual Activity    Alcohol use: No    Drug use: No    Sexual activity: Yes     Partners: Male     Birth control/protection: Abstinence   Lifestyle    Physical activity:     Days per week: Not on file     Minutes per session: Not on file    Stress: Not on file   Relationships    Social connections:     Talks on phone: Not on file     Gets together: Not on file     Attends Religion service: Not on file     Active member of club or organization: Not on file     Attends meetings of clubs or organizations: Not on file     Relationship status: Not on file   Other Topics Concern    Not on file   Social History Narrative    Not on file       Date of surgery:     Review of Systems     General/Constitutional: Chills denies. Fatigue denies. Fever denies. Weight gain denies. Weight loss denies.    Musculoskeletal: Comments: See HPI for details    Skin: Rash denies.    Objective:   Vital Signs:   Vitals:    09/09/19 1153   BP: 96/64   Pulse: 64        Physical Exam    This a well-developed, well nourished patient in no acute distress.  They are alert and oriented and cooperative to examination.  Pt. walks without an antalgic gait.      General Examination:      Constitutional: The patient is alert and oriented to lace person and time. Mood is pleasant.     Head/Face: Normal facial features normal eyebrows    Eyes: Normal extraocular motion bilaterally    Lungs: Respirations are equal and unlabored    Gait is coordinated.    Cardiovascular: There are no swelling or varicosities present.    Lymphatic: Negative for adenopathy    Skin: Normal    Neurological: Level of consciousness normal. Oriented to place person and time and situation    Psychiatric: Oriented to time place person and situation    Incision well-healed slight limitation of motion it is noticed that the patient's overall volume of her voice is lower than normal.  XRAY Report/ Interpretation: AP and lateral x-rays of the cervical spine were performed today. Indications postoperative cervical spine surgery. Findings: Postoperative C4-5 with anterior plate fixation. No signs of hardware failure.      Assessment:       1. Cervical disc disorder at C4-C5 level with radiculopathy s/p C4-5 Cervical spine disectomy, arthroplasty and decompression    2. Dysphagia, unspecified type    3. Cervical disc disorder at C4-C5 level with radiculopathy        Plan:       Osman was seen today for post-op evaluation.    Diagnoses and all orders for this visit:    Cervical disc disorder at C4-C5 level with radiculopathy s/p C4-5 Cervical spine disectomy, arthroplasty and decompression  -     X-Ray Cervical Spine AP And Lateral    Dysphagia, unspecified type    Cervical disc disorder at C4-C5 level with radiculopathy  -     X-Ray Cervical Spine AP And Lateral         Follow up in about 3 weeks (around 9/30/2019) for ATTY PO Cervical Fusion 8/1/19 .    Observation advise. Should symptoms with dysphagia continue we will refer patient to tear nose and throat specialist for evaluation probable cause of symptoms from postoperative swelling      This note was created using Dragon voice recognition software that occasionally  misinterpreted phrases or words.

## 2019-09-17 ENCOUNTER — PATIENT MESSAGE (OUTPATIENT)
Dept: ORTHOPEDICS | Facility: CLINIC | Age: 29
End: 2019-09-17

## 2019-09-17 ENCOUNTER — TELEPHONE (OUTPATIENT)
Dept: ORTHOPEDICS | Facility: CLINIC | Age: 29
End: 2019-09-17

## 2019-09-17 DIAGNOSIS — R13.10 DYSPHAGIA, UNSPECIFIED TYPE: Primary | ICD-10-CM

## 2019-09-17 DIAGNOSIS — Z98.1 STATUS POST CERVICAL SPINAL FUSION: ICD-10-CM

## 2019-09-18 ENCOUNTER — PATIENT MESSAGE (OUTPATIENT)
Dept: ORTHOPEDICS | Facility: CLINIC | Age: 29
End: 2019-09-18

## 2019-09-30 ENCOUNTER — OFFICE VISIT (OUTPATIENT)
Dept: ORTHOPEDICS | Facility: CLINIC | Age: 29
End: 2019-09-30
Payer: COMMERCIAL

## 2019-09-30 VITALS
SYSTOLIC BLOOD PRESSURE: 102 MMHG | BODY MASS INDEX: 29.02 KG/M2 | HEART RATE: 80 BPM | HEIGHT: 64 IN | WEIGHT: 170 LBS | DIASTOLIC BLOOD PRESSURE: 60 MMHG

## 2019-09-30 DIAGNOSIS — M50.121 CERVICAL DISC DISORDER AT C4-C5 LEVEL WITH RADICULOPATHY: ICD-10-CM

## 2019-09-30 DIAGNOSIS — M50.121 CERVICAL DISC DISORDER AT C4-C5 LEVEL WITH RADICULOPATHY: Primary | ICD-10-CM

## 2019-09-30 PROCEDURE — 99024 POSTOP FOLLOW-UP VISIT: CPT | Mod: S$GLB,,, | Performed by: ORTHOPAEDIC SURGERY

## 2019-09-30 PROCEDURE — 99024 PR POST-OP FOLLOW-UP VISIT: ICD-10-PCS | Mod: S$GLB,,, | Performed by: ORTHOPAEDIC SURGERY

## 2019-09-30 RX ORDER — HYDROCODONE BITARTRATE AND ACETAMINOPHEN 7.5; 325 MG/1; MG/1
1 TABLET ORAL EVERY 6 HOURS PRN
Qty: 28 TABLET | Refills: 0 | Status: SHIPPED | OUTPATIENT
Start: 2019-09-30 | End: 2019-10-07

## 2019-09-30 NOTE — LETTER
September 30, 2019      [facility]  [address]  [place] [address]  Phone: [phone]  Fax: [phone]       Patient: Osman Rm   YOB: 1990  Date of Visit: 09/30/2019    To Whom It May Concern:    Janie Rm  was at our office on 09/30/2019. At this time, she is currently unable to work due to her having cervical surgery. She has had a few post-operative complications that warrants some additional time off to recover. We will re-assess her status in six weeks. If you have any questions or concerns, or if I can be of further assistance, please do not hesitate to contact me.    Sincerely,    Ritesh Fuentes PA-C

## 2019-09-30 NOTE — PROGRESS NOTES
Subjective:    Patient ID: Osman Rm is a 29 y.o. female.    Chief Complaint: Pain of the Neck (   ATTY//  cervical fusion follow up. She has neck pain when she turns her head to the left.)      History of Present Illness  Patient is here about 2 months status post C4-5 ACDF revision of a total disc arthroplasty. She continues to have some significant hoarseness as well as dysphagia. She has been evaluated by an ENT and speech therapy has been recommended. Unfortunately she has not been contacted to start this. She also continues to have some left-sided upper trapezius and sternocleidomastoid pain and spasm. She also continues to have pain on the right iliac crest from the bone donor site.    Current Medications  Current Outpatient Medications   Medication Sig Dispense Refill    ondansetron (ZOFRAN) 8 MG tablet Take 1 tablet (8 mg total) by mouth every 6 to 8 hours as needed for Nausea. 30 tablet 0    oxyCODONE-acetaminophen (PERCOCET)  mg per tablet Take 1 tablet by mouth every 4 to 6 hours as needed for Pain. Script written by Dr. Brennan. 40 tablet 0    traMADol (ULTRAM) 50 mg tablet Take 1 tablet (50 mg total) by mouth every 6 (six) hours as needed for Pain. 28 tablet 3     No current facility-administered medications for this visit.        Allergies  Review of patient's allergies indicates:   Allergen Reactions    Adhesive tape-silicones Hives       Past Medical History  Past Medical History:   Diagnosis Date    Asthma        Surgical History  Past Surgical History:   Procedure Laterality Date    ANTERIOR CERVICAL DISCECTOMY W/ FUSION N/A 6/28/2018    Procedure: DISCECTOMY, SPINE, CERVICAL, ANTERIOR APPROACH, ARTHROPLASTY C4-5;  Surgeon: Andrew Brennan MD;  Location: [facility];  Service: Orthopedics;  Laterality: N/A;    ANTERIOR CERVICAL DISCECTOMY W/ FUSION N/A 8/1/2019    Procedure: DISCECTOMY, SPINE, CERVICAL, ANTERIOR APPROACH, WITH FUSION C4-5;  Surgeon: Andrew Brennan MD;  Location:  [facility] OR;  Service: Orthopedics;  Laterality: N/A;    BONE GRAFT N/A 8/1/2019    Procedure: BONE GRAFT;  Surgeon: Andrew Brennan MD;  Location: [facility] OR;  Service: Orthopedics;  Laterality: N/A;    FUSION OF SPINE WITH INSTRUMENTATION N/A 8/1/2019    Procedure: FUSION, SPINE, WITH INSTRUMENTATION C4-5;  Surgeon: Andrew Brennan MD;  Location: [facility] OR;  Service: Orthopedics;  Laterality: N/A;    medial branch block      SPINAL FUSION      TONSILLECTOMY         Family History:   History reviewed. No pertinent family history.    Social History:   Social History     Socioeconomic History    Marital status:      Spouse name: Not on file    Number of children: Not on file    Years of education: Not on file    Highest education level: Not on file   Occupational History    Not on file   Social Needs    Financial resource strain: Not on file    Food insecurity:     Worry: Not on file     Inability: Not on file    Transportation needs:     Medical: Not on file     Non-medical: Not on file   Tobacco Use    Smoking status: Never Smoker    Smokeless tobacco: Never Used   Substance and Sexual Activity    Alcohol use: No    Drug use: No    Sexual activity: Yes     Partners: Male     Birth control/protection: Abstinence   Lifestyle    Physical activity:     Days per week: Not on file     Minutes per session: Not on file    Stress: Not on file   Relationships    Social connections:     Talks on phone: Not on file     Gets together: Not on file     Attends Orthodox service: Not on file     Active member of club or organization: Not on file     Attends meetings of clubs or organizations: Not on file     Relationship status: Not on file   Other Topics Concern    Not on file   Social History Narrative    Not on file       Date of surgery: 08/01/2019    Review of Systems     General/Constitutional: Chills denies. Fatigue denies. Fever denies. Weight gain denies. Weight loss denies.    Musculoskeletal:  "Comments: See HPI for details    Skin: Rash denies.    Objective:   Vital Signs:   Vitals:    09/30/19 1305   BP: 102/60   Pulse: 80        Physical Exam    This a well-developed, well nourished patient in no acute distress.  They are alert and oriented and cooperative to examination.  Pt. walks without an antalgic gait.      General Examination:     Constitutional: The patient is alert and oriented to lace person and time. Mood is pleasant.     Head/Face: Normal facial features normal eyebrows    Eyes: Normal extraocular motion bilaterally    Lungs: Respirations are equal and unlabored    Gait is coordinated.    Cardiovascular: There are no swelling or varicosities present.    Lymphatic: Negative for adenopathy    Skin: Normal    Neurological: Level of consciousness normal. Oriented to place person and time and situation    Psychiatric: Oriented to time place person and situation    Cervical exam demonstrates continued hoarseness. Incision is well-healed. Range of motion left rotation limited secondary to pain. Left upper trapezius and sternomastoid tenderness palpation with muscle spasm area upper extremities distal neurovascular intact.    XRAY Report/ Interpretation: Cervical postop AP and lateral x-rays taken in the office today reviewed patient demonstrated a normal postoperative appearance with good interbody bony fusion      Assessment:       1. Cervical disc disorder at C4-C5 level with radiculopathy s/p C4-5 Cervical spine disectomy, arthroplasty and decompression        Plan:       Osman was seen today for pain.    Diagnoses and all orders for this visit:    Cervical disc disorder at C4-C5 level with radiculopathy s/p C4-5 Cervical spine disectomy, arthroplasty and decompression  -     X-Ray Cervical Spine AP And Lateral         No follow-ups on file.  Ritesh Fuentes, physician's assistant served in the capacity as a "scribe" for this patient encounter  A "face to face" encounter occurred with Dr." Mika on this date  The treatment plan and medical decision making is outlined below:  We will refer her for speech therapy. She remains unable to return to work at this time secondary to ongoing symptoms and postoperative complications. I refilled her medications today. We will see her back in 6 weeks or sooner if needed.      This note was created using Dragon voice recognition software that occasionally misinterpreted phrases or words.

## 2019-10-01 ENCOUNTER — DOCUMENTATION ONLY (OUTPATIENT)
Dept: REHABILITATION | Facility: HOSPITAL | Age: 29
End: 2019-10-01

## 2019-10-01 NOTE — PROGRESS NOTES
No Show Note/Documentation    Patient: Osman Rm  Date of Session: 10/1/2019  Diagnosis: No diagnosis found.  MRN: [id number]    Osman Rm did not attend her scheduled Speech Therapy Initial Evaluation appointment today. She did not call to cancel nor reschedule. No charges have been posted today.     SARAH Bro., CCC-SLP, IS  Speech-Language Pathologist  10/1/2019

## 2019-10-07 ENCOUNTER — CLINICAL SUPPORT (OUTPATIENT)
Dept: REHABILITATION | Facility: HOSPITAL | Age: 29
End: 2019-10-07
Payer: COMMERCIAL

## 2019-10-07 ENCOUNTER — TELEPHONE (OUTPATIENT)
Dept: ORTHOPEDICS | Facility: CLINIC | Age: 29
End: 2019-10-07

## 2019-10-07 DIAGNOSIS — R13.10 DYSPHAGIA, UNSPECIFIED TYPE: Primary | ICD-10-CM

## 2019-10-07 DIAGNOSIS — R49.0 DYSPHONIA: ICD-10-CM

## 2019-10-07 DIAGNOSIS — Z98.1 STATUS POST CERVICAL SPINAL FUSION: ICD-10-CM

## 2019-10-07 DIAGNOSIS — R13.12 OROPHARYNGEAL DYSPHAGIA: ICD-10-CM

## 2019-10-07 PROCEDURE — 92610 EVALUATE SWALLOWING FUNCTION: CPT | Mod: PO

## 2019-10-07 PROCEDURE — 92522 EVALUATE SPEECH PRODUCTION: CPT | Mod: PO

## 2019-10-07 NOTE — TELEPHONE ENCOUNTER
----- Message from JOSS Alexis sent at 10/7/2019  1:14 PM CDT -----  Regarding: FW: MBSS order      ----- Message -----  From: CLYDE Bhatti, CCC-SLP  Sent: 10/7/2019  12:52 PM CDT  To: JOSS Alexis  Subject: MBSS order                                       Dr. Fuentes,    I evaluated your patient, Osman Rm , for dysphagia today. she  presents with s/s of dysphagia across all consistencies. I believe she  would benefit from a Modified Barium Swallow Study to objectively assess her  swallow, rule out silent aspiration, and determine the safest possible diet. Please place the order for AMB Modified Barium Swallow Study, order number [id number] if you agree.     Thank You,  SHOSHANA Bro, CCC-SLP, CBIS  Speech-Language Pathologist

## 2019-10-07 NOTE — TELEPHONE ENCOUNTER
----- Message from Cesia Covarrubias sent at 10/7/2019 12:55 PM CDT -----  Contact: Patient  Patient called and needs a order for a swallow study. Please call [phone]

## 2019-10-07 NOTE — PLAN OF CARE
"Outpatient Neurological Rehabilitation  Speech and Language Therapy Evaluation    Date: 10/7/2019     Name: Osman Rm   MRN: [id number]    Therapy Diagnosis:   Encounter Diagnoses   Name Primary?    Dysphonia     Oropharyngeal dysphagia     Physician: Ritesh Fuentes PA  Physician Orders: [id number] - Ambulatory Referral to Speech Therapy  Medical Diagnosis:      M50.121 (ICD-10-CM) - Cervical disc disorder at C4-C5 level with radiculopathy   Z98.1 (ICD-10-CM) - Arthrodesis status     Visit # / Visits Authorized:  1 / 1    Date of Evaluation:  10/7/2019   Insurance Authorization Period: 9/30/2019 to 9/29/2020  Plan of Care Certification:    10/7/2019 to 12/6/2019      Time In: 0945   Time Out: 1045   Total Billable Time: 1 hour  Procedure Min.   Evaluation of Speech Sound Production 30   Swallow and Oral Function Evaluation  30     Precautions:Standard and aspiration  Subjective   Date of Onset: August 2019 after C4 & C5 neck fusion surgery  History of Current Condition: Pt originally had neck surgery last year, but there were no complications per pt report. She had surgery again in August to fuse her 4 and 5 cervical vertebrae due to neck pain. Osman reports that she woke up from surgery and was speaking "very quietly". She also reported that she struggled eating and drinking.Pt currently only eats soft foods to avoid coughing. Pt reported having difficulty swallowing foods and liquids. She has episodes where she has trouble breathing when swallowing. Osman also indicated some breathing complications when laying flat. At some points during the day, the pt reported that she completely loses her voice.  Past Medical History: Osman Rm  has a past medical history of Asthma.  Osman Rm  has a past surgical history that includes Tonsillectomy; Spinal fusion; Anterior cervical discectomy w/ fusion (N/A, 6/28/2018); medial branch block; Anterior cervical discectomy w/ fusion (N/A, 8/1/2019); " "Fusion of spine with instrumentation (N/A, 8/1/2019); and Bone graft (N/A, 8/1/2019).  Medical Hx and Allergies: Osman has a current medication list which includes the following prescription(s): ondansetron, oxycodone-acetaminophen, and tramadol.   Review of patient's allergies indicates:   Allergen Reactions    Adhesive tape-silicones Hives     Prior Therapy:  none  Social History:  Patient lives with her  and 4 year-old child. 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   Prior Level of Function: independent   Current Level of Function: Independent  Pain:   2/10  Pain Location / Description: Pt indicated that there was some discomfort in her neck today.  Nutrition: Pt is on a soft diet w/ thin liquids by choice.  Patient's Therapy Goals:  Pt wants to "speak louder and eat normally"  Objective   Formal Assessment:  Clinical Swallow Exam/ Lyly Mechanism Exam:  Mandibular Strength and Mobility - Trigeminal Nerve (CNV) Rest: WFL                                      Lateralization: WFL    Protrusion: WFL    Retraction:  WFL    Involuntary Movement: absent    Oral Labial Strength and Mobility -Facial Nerve (CN VII)  Rest: WFL                                     Protrusion: WFL    Retraction: WFL    Involuntary Movement: absent    Lingual Strength and Mobility- Hypoglossal Nerve (CN XII)  Rest: WFL                                        Lateralization: WFL    Protrusion: WFL    Retraction:  WFL    Involuntary Movement: absent    Velar Elevation - Glossopharyngeal Nerve (CN IX) and Vagus (CN X) Rest: no abnormality                                      Symmetry with Short Production of "ah": reduced elevation  Maximum Phonation Time: due to pt being aphonic.   Involuntary Movement: absent    Buccal Strength and Mobility - Facial Nerve (CN VII)  WFL    Facial Sensation and Movement - Facial Nerve (CN VII) Symmetrical at rest: yes  Wrinkle forehead: yes  Able to close eyes " tightly: yes  Taste to Anterior 2/3 of Tongue: yes      The Voice Handicap Index (VHI) was used to assess the pt's perceptual analysis of her voice. Pt scored herself as a 102/120 indicating an impairment in the severe range.    Clinical Swallow Examination:   Pt presented with:   THIN:-self regulated cup sip of water x2     PUREE:- tsp bites of pudding x2    SOLID: - 2 bites of zulay cracker     DESCRIPTION:  Oral phase: Pt formed an adequate seal around the straw w/ no anterior spillage noted. Osman had a prolonged bolus hold for all trials. Pt also had prolonged mastication of the cracker.   Pharyngeal Phase: Laryngeal elevation was palpated. Pt required multiple swallows (2-3) with liquid and solids. Immediate cough and throat clearing needed for all consistencies. She reported that the zulay cracker was stuck and required additional solids w/ a liquid rinse. Pt also reported sensation of liquids and solids feeling stuck in her throat.    Recommendation: Modified Barium Swallow Study (MBSS)      [employer] National Outcome Measures System (NOMS):   NOMS Swallowing  LEVEL 5: Swallowing is safe with minimal diet restriction and/or occasionally requires minimal cueing to use compensatory strategies. The individual may occasionally self-cue. All nutrition and hydration needs are met by mouth at mealtime      Assessment     Osman presents to [facility] s/p medical diagnosis of Cervical disc disorder at C4-C5 level with radiculopathy and arthrodesis status. She presents with oropharyngeal dysphagia c/b bolus holding, prolonged mastication, oral residue, and coughing/clearing throat w/ all consistencies. Pt fears choking while eating and drinking. Pt presents w/ dysphonia c/b raspy vocal quality and reduced vocal intensity to the point of speaking in a whisper w/o being able to increase vocal intensity. Overall speech intelligibility is good however reduced vocal intensity makes it  difficult to hear her. Demonstrates impairments including limitations as described in the problem list. Positive prognostic factors include patient motivation. Negative prognostic factors include complex medical history.No barriers to therapy identified. Patient will benefit from skilled, outpatient neurological rehabilitation speech therapy.    Rehab Potential: good  Pt's spiritual, cultural and educational needs considered and pt agreeable to plan of care and goals.    Short Term Goals (2 weeks):   1. Complete voice evaluation at the [facility] center  2. Complete Modified Barium Swallow Study  3. Pt will be evaluated by an ENT in the [facility] center    Long Term Goals (4 weeks):    1. She  will demonstrate improved vocal quality and intonation at the conversation level to communicate basic medical and social needs in the functional living environment.   2. She  will utilize compensatory strategies with optimum safety and efficiency of swallowing function on PO intake without overt signs and symptoms of aspiration for regular consistency diet level.       Plan     Plan of Care Certification Period: 10/7/2019  to 11/8/19    Recommended Treatment Plan: To be determined by SLP in [facility] center and ENT.      Other Recommendations: 1. Complete an MBSS 2. Referral to voice clinic and ENT    Therapist's Name:   FELICITY Reynoso   Clinician  Speech-Language Pathology    I certify that I was present in the room directing the student in service delivery and guiding them using my skilled judgment. As the co-signing therapist I have reviewed the students documentation and am responsible for the treatment, assessment, and plan.     SARAH Bro., CCC-SLP, CBIS  Speech-Language Pathologist  10/7/2019

## 2019-10-07 NOTE — PATIENT INSTRUCTIONS
Aspiration Precautions:    Tips for safe eating and drinking:   · Clean your mouth before and after meals.  Make sure your mouth is clean and moist before starting your meal. When you are finished, clean your mouth again. Make sure there is no food around the teeth or stuck in your cheeks.   · Be alert. Eat, drink, and take your medications only when you are alert and paying attention.   · Reduce distractions. Turn off the TV and reduce distractions while you are eating and drinking.  · Sit upright. Sit fully upright for your meals and to take your medications. It's best to eat and drink while sitting in a chair, unless your healthcare provider gave you other positioning instructions.   · Stay upright. Stay fully upright for at least 30 minutes after a meal. You may also need to avoid eating 1-2 hours before bed.   · Stay active.  Exercise as directed by your healthcare provider. Staying active helps your lungs stay clear.  · Keep your lungs clear. Ask your doctor to recommend other therapy or devices to help you with the strength of your cough and the overall clearance of your lungs.     Copyright [employer] 2017

## 2019-10-08 ENCOUNTER — TELEPHONE (OUTPATIENT)
Dept: ORTHOPEDICS | Facility: CLINIC | Age: 29
End: 2019-10-08

## 2019-10-08 NOTE — TELEPHONE ENCOUNTER
----- Message from JOSS Alexis sent at 10/7/2019  1:14 PM CDT -----  Regarding: FW: MBSS order      ----- Message -----  From: CLYDE Bhatti, CCC-SLP  Sent: 10/7/2019  12:52 PM CDT  To: JOSS Alexis  Subject: MBSS order                                       Dr. Fuentes,    I evaluated your patient, Osman Rm , for dysphagia today. she  presents with s/s of dysphagia across all consistencies. I believe she  would benefit from a Modified Barium Swallow Study to objectively assess her  swallow, rule out silent aspiration, and determine the safest possible diet. Please place the order for AMB Modified Barium Swallow Study, order number [id number] if you agree.     Thank You,  SHOSHANA Bro, CCC-SLP, CBIS  Speech-Language Pathologist

## 2019-10-09 PROBLEM — R13.12 OROPHARYNGEAL DYSPHAGIA: Status: ACTIVE | Noted: 2019-10-09

## 2019-10-09 PROBLEM — R49.0 DYSPHONIA: Status: ACTIVE | Noted: 2019-10-09

## 2019-10-15 ENCOUNTER — CLINICAL SUPPORT (OUTPATIENT)
Dept: SPEECH THERAPY | Facility: HOSPITAL | Age: 29
End: 2019-10-15
Attending: ORTHOPAEDIC SURGERY
Payer: COMMERCIAL

## 2019-10-15 ENCOUNTER — HOSPITAL ENCOUNTER (OUTPATIENT)
Dept: RADIOLOGY | Facility: HOSPITAL | Age: 29
Discharge: HOME | End: 2019-10-15
Attending: ORTHOPAEDIC SURGERY
Payer: COMMERCIAL

## 2019-10-15 DIAGNOSIS — R13.10 DYSPHAGIA, UNSPECIFIED TYPE: ICD-10-CM

## 2019-10-15 DIAGNOSIS — Z98.1 STATUS POST CERVICAL SPINAL FUSION: ICD-10-CM

## 2019-10-15 PROCEDURE — 74230 X-RAY XM SWLNG FUNCJ C+: CPT | Mod: 26,,, | Performed by: RADIOLOGY

## 2019-10-15 PROCEDURE — 74230 FL MODIFIED BARIUM SWALLOW SPEECH STUDY: ICD-10-PCS | Mod: 26,,, | Performed by: RADIOLOGY

## 2019-10-15 PROCEDURE — 97535 SELF CARE MNGMENT TRAINING: CPT

## 2019-10-15 PROCEDURE — 74230 X-RAY XM SWLNG FUNCJ C+: CPT | Mod: TC

## 2019-10-15 PROCEDURE — 92611 MOTION FLUOROSCOPY/SWALLOW: CPT

## 2019-10-15 NOTE — PROGRESS NOTES
REHAB SERVICE VIDEO SWALLOW STUDY    Name: Osman Rm  YOB: 1990  MRN:  [id number]  Referring Provider: Andrew Brennan MD  [address] [facility]  [address] [place] [id number]  Onset Date:  06/2018 s/p cervical surgery  SOC Date:  10/15/2019  Certification Period:  10/15/2019 to 11/14/2019  Primary Diagnosis:  Dysphagia, unspecified type  Treatment Diagnosis:  Dysphagia, unspecified type  Secondary Diagnosis:  Status post cervical fusion    Prior Therapy Dates/Results (same condition):  Pt's swallow function and voice evaluated by outpatient SLP, Becky Jacobson, on 10/7/2019, which revealed severe vocal impairment and overt s/s of aspiration across textures. MBSS was recommended prior to continuing tx.     Functional Deficits Leading to Referral:  Patient was referred for a Modified Barium Swallow Study to assess the efficiency of his/her swallow function, rule out aspiration and make recommendations regarding safe dietary consistencies, effective compensatory strategies, and safe eating environment.     Pertinent Medical History:    Past Medical History:   Diagnosis Date    Asthma      Past Surgical History:   Procedure Laterality Date    ANTERIOR CERVICAL DISCECTOMY W/ FUSION N/A 6/28/2018    Procedure: DISCECTOMY, SPINE, CERVICAL, ANTERIOR APPROACH, ARTHROPLASTY C4-5;  Surgeon: Andrew Brennan MD;  Location: [facility] OR;  Service: Orthopedics;  Laterality: N/A;    ANTERIOR CERVICAL DISCECTOMY W/ FUSION N/A 8/1/2019    Procedure: DISCECTOMY, SPINE, CERVICAL, ANTERIOR APPROACH, WITH FUSION C4-5;  Surgeon: Andrew Brennan MD;  Location: [facility] OR;  Service: Orthopedics;  Laterality: N/A;    BONE GRAFT N/A 8/1/2019    Procedure: BONE GRAFT;  Surgeon: Andrew Brennan MD;  Location: [facility] OR;  Service: Orthopedics;  Laterality: N/A;    FUSION OF SPINE WITH INSTRUMENTATION N/A 8/1/2019    Procedure: FUSION, SPINE, WITH INSTRUMENTATION C4-5;  Surgeon: Andrew Brennan MD;  Location: [facility] OR;  Service:  Orthopedics;  Laterality: N/A;    medial branch block      SPINAL FUSION      TONSILLECTOMY         Prior Level of Function:  Ind with all ADLs, 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 Driving.   Social Cultural:  All social/cultural beliefs were taken into consideration for POC.   Nutrition:  Pt appears well nourished.  Signs of Abuse:  No  Medication:    Current Outpatient Medications on File Prior to Visit   Medication Sig Dispense Refill    ondansetron (ZOFRAN) 8 MG tablet Take 1 tablet (8 mg total) by mouth every 6 to 8 hours as needed for Nausea. 30 tablet 0    oxyCODONE-acetaminophen (PERCOCET)  mg per tablet Take 1 tablet by mouth every 4 to 6 hours as needed for Pain. Script written by Dr. Brennan. 40 tablet 0    traMADol (ULTRAM) 50 mg tablet Take 1 tablet (50 mg total) by mouth every 6 (six) hours as needed for Pain. 28 tablet 3     Current Facility-Administered Medications on File Prior to Visit   Medication Dose Route Frequency Provider Last Rate Last Dose    barium sulfate (VARIBAR HONEY) oral suspension 50 mL  50 mL Oral ONCE PRN Andrew Brennan MD        barium sulfate (VARIBAR PUDDING) oral paste 20 mL  20 mL Oral ONCE PRN Andrew Brennan MD           Alertness/Attention:  Pt awake/alert and Ox4.     Oral Motor:    Oral Musculature: WFL for speech and swallowing  Structure Abnormalities: N/a  Dentition: Present and adequate  Secretion Management: Good  Mucosal Quality: Good  Mandibular Strength and Mobility: WFL  Oral Labial Strength and Mobility: WFL  Lingual Strength and Mobility: WFL  Velar Elevation: Adequate velar elevation  Buccal Strength and Mobility: WFL  Volitional Cough: Elicited, weak  Volitional Swallow: Timely  Voice Prior to PO Intake: Low volume, severely breathy    Patient Position:  Sitting  View:  Lateral  Presentations:    THIN:- 5cc, 10cc, 20cc cup sips thin liquid barium; self regulated straw sips thin liquid   PUREE:- self fed tsp bites of pudding with  barium paste x1  DENTAL SOFT:- self fed tsp bites of diced peaches coated in barium powder x1  SOLID:- 1 inch bite of zulay cracker with barium paste x1    Oral Preparation / Oral Phase   Pt presents with mild oral dysphagia c/b the following:   Prolonged bolus holding across consistencies   Delayed A-P transfer   Piecemeal deglutition    Oral residue s/p swallow along BOT and valleculae: mild-moderate   Prolonged mastication   Reduced base of tongue strength   Bolus holding   No presence of naso-pharyngeal reflux    Pharyngeal Phase   Pt presents with mild pharyngeal dysphagia c/b the following:   Delayed swallow initiation with trigger ranging from level of valleculae (for straw sips thin liquid, puree, soft solids, and hard solids) to lower pyriform sinuses (for 5-20cc cup sips thin liquid)   Premature bolus loss with pooling into lower pyriform sinus for cup sips thin liquid   Adequate epiglottic inversion with complete laryngeal vestibule closure during the swallow   Multiple, spontaneous swallows needed per small bite/sip   Adequate laryngeal lift/excursion    Pt with reduced BOT retraction   Pt without penetration or aspiration before, during, and after the swallow across ALL consistencies.    Pt with impaired oropharyngeal sensation requiring cuing to initiate dry swallow to clear residue from oropharynx AND producing cough s/p swallow though no barium observed throughout oropharynx   Strategies attempted were effective in reducing the risk of penetration/aspiration    Mild vallecular and slight pharyngeal wall residue s/p swallow which increased as amount of intake increased   No stasis observed within pharynx  Per Rosenbeck's 8-Point Penetration- Aspiration Scale: 1) Material does not enter airway.    Cervical Esophageal Phase    UES appeared to accommodate all bolus types without stasis or retrograde movement observed     Strategies/Compensatory Techniques Utilized:    1. Multiple  swallows: adequate in clearing oropharyngeal residue  2. Straw sips: improved production of more cohesive bolus with reduced bolus holding    Impressions:  Pt presents with mild oropharyngeal dysphagia c/b reduced oral bolus control, oral bolus holding, premature bolus loss with pooling into pharynx, delayed swallow initiation, and mild oropharyngeal residue s/p swallow. However, no overt penetration or aspiration was observed under fluoro. Pt did produce throat clear/cough s/p MBSS while reviewing results, though no barium present within oropharynx upon termination of MBSS. She is deemed safe to continue an oral diet while implementing standard aspiration precautions. Pt remains at RISK for aspiration 2/2 above deficits.      Recommendations/Precautions:    1. Continue outpatient SLP services with recommendation for management by [facility]  2. Continue on Regular/Thin liquid diet  3. Implement 5 small meals throughout the day vs 3 large ones 2/2 difficulty breathing during meal time  4. Small bites/sips  5. Straw sips encouraged  6. Alternate bites/sips  7. Slow rate of intake  8. Multiple dry swallows per bite/sip  9. Watch for any overt s/s of aspiration (ie coughing/choking, throat clearing, wet/gurgly voice, spike in fever, trouble breathing, reddened face, nasal emission, watery eyes, etc.)    Education: Extensive education provided to pt re: role of SLP, POC, MBSS procedure, results of MBSS, affect of ACDF surgery on swallow function, diet recommendation, and aspiration risks including aspiration PNA. Following MBSS, SLP reviewed radiographic images/video and swallow anatomy/physiology with pt. Pt then reviewed flexible endoscopic video with SLP which she had on her personal phone. Pt stated scope was completed in September of 2019 by ENT outside of [facility] and would like to f/u with [facility] physician. Pt encouraged to continue outpatient SLP services to improve oropharyngeal  strength/coordination and vocal quality. Pt reported current SLP, Becky, did refer her to [facility] to be seen by SLP and ENT services. This SLP in agreement with Voice Center recommendation. Pt verbalized understanding of all taught material. She did record this SLP's explanation of MBSS recs. All questions answered within scope of practice.     Plan:    1. SLP to send MBSS results electronically to referring provider via Epic  2. MBSS results to be sent to treating therapist, Becky, via Epic.      TIME RECORD  Date: 10/15/2019  Start Time:  1100 AM  Stop Time:  1138 AM    PROCEDURES:  Total Timed Minutes:  15 mins  Total Timed Units:  1  Total Untimed Units:  1  Charges Billed/# of units:  2    SARAH Patricia, CCC-SLP  Speech-Language Pathologist

## 2019-10-22 ENCOUNTER — OFFICE VISIT (OUTPATIENT)
Dept: OTOLARYNGOLOGY | Facility: CLINIC | Age: 29
End: 2019-10-22
Payer: COMMERCIAL

## 2019-10-22 ENCOUNTER — CLINICAL SUPPORT (OUTPATIENT)
Dept: SPEECH THERAPY | Facility: HOSPITAL | Age: 29
End: 2019-10-22
Payer: COMMERCIAL

## 2019-10-22 VITALS
BODY MASS INDEX: 29.14 KG/M2 | WEIGHT: 169.75 LBS | SYSTOLIC BLOOD PRESSURE: 102 MMHG | DIASTOLIC BLOOD PRESSURE: 67 MMHG | HEART RATE: 77 BPM

## 2019-10-22 DIAGNOSIS — Z98.890 H/O CERVICAL SPINE SURGERY: ICD-10-CM

## 2019-10-22 DIAGNOSIS — R49.0 DYSPHONIA: Primary | ICD-10-CM

## 2019-10-22 DIAGNOSIS — R49.0 DYSPHONIA: ICD-10-CM

## 2019-10-22 DIAGNOSIS — R13.12 OROPHARYNGEAL DYSPHAGIA: ICD-10-CM

## 2019-10-22 DIAGNOSIS — M50.121 CERVICAL DISC DISORDER AT C4-C5 LEVEL WITH RADICULOPATHY: ICD-10-CM

## 2019-10-22 PROCEDURE — 99999 PR PBB SHADOW E&M-EST. PATIENT-LVL III: ICD-10-PCS | Mod: PBBFAC,,, | Performed by: NURSE PRACTITIONER

## 2019-10-22 PROCEDURE — 92507 TX SP LANG VOICE COMM INDIV: CPT | Mod: GN | Performed by: SPEECH-LANGUAGE PATHOLOGIST

## 2019-10-22 PROCEDURE — 3008F PR BODY MASS INDEX (BMI) DOCUMENTED: ICD-10-PCS | Mod: CPTII,S$GLB,, | Performed by: NURSE PRACTITIONER

## 2019-10-22 PROCEDURE — 31579 PR LARYNGOSCOPY, FLEX/RIGID TELESCOPIC, W/STROBOSCOPY: ICD-10-PCS | Mod: S$GLB,,, | Performed by: NURSE PRACTITIONER

## 2019-10-22 PROCEDURE — 3008F BODY MASS INDEX DOCD: CPT | Mod: CPTII,S$GLB,, | Performed by: NURSE PRACTITIONER

## 2019-10-22 PROCEDURE — 99999 PR PBB SHADOW E&M-EST. PATIENT-LVL III: CPT | Mod: PBBFAC,,, | Performed by: NURSE PRACTITIONER

## 2019-10-22 PROCEDURE — 31579 LARYNGOSCOPY TELESCOPIC: CPT | Mod: S$GLB,,, | Performed by: NURSE PRACTITIONER

## 2019-10-22 PROCEDURE — 99203 OFFICE O/P NEW LOW 30 MIN: CPT | Mod: 25,S$GLB,, | Performed by: NURSE PRACTITIONER

## 2019-10-22 PROCEDURE — 99203 PR OFFICE/OUTPT VISIT, NEW, LEVL III, 30-44 MIN: ICD-10-PCS | Mod: 25,S$GLB,, | Performed by: NURSE PRACTITIONER

## 2019-10-22 NOTE — ASSESSMENT & PLAN NOTE
29 year old female with normal vocal cord movement. We discussed that her dysphonia is functional in nature. Referral placed for voice therapy. Questions answered. RTC prn.

## 2019-10-22 NOTE — PROGRESS NOTES
Referring provider: Jeannie Pagan  Reason for visit:  Voice treatment (CPT 09103)  Session #1    History / Subjective   I had the pleasure of seeing Osman Rm for her first treatment session following SLP evaluation on 10/7/19 with Becky Jacobson SLP.  Dysphonia/aphonia was noted but there were no stimulability trials conducted at that time.  She presents for visit with NP Maia Pagan on same day for laryngeal eval/stroboscopy.  Stroboscopy did not indicate any movement abnormalities or mass/lesion/mucosal changes and therapy was recommended.      Objective   The primary goal of todays session was to elicit improved/normal voice.  This was targeted using CLM, resonant, and SOVT treatment modalities.    Perceptual/behavioral assessment  On presentation  -CAPE-V Overall Score: 100  -Quality: aphonic  -Volume: decreased projection  -Pitch: n/a  -Flexibility: none  -Habitual respiratory pattern: breath holding     End of session  -CAPE-V Overall Score: 6  -Quality: WNL, mild trejo/strain  -Volume: appropriate for age and gender  -Pitch: appropriate for age and gender  -Flexibility: appropriate for age and gender  -Habitual respiratory pattern: diaphragmatic     Treatment  Initiated BOT and TH circumlaryngeal massage combined with resonant /i/ phonation and /m/ hum sounds; transitioned to short words/phrases from /m/ sound.  Pt with some TH space discomfort during massage.  With cues from /m/ sounds pt was able to carry into connected speech, but some strain/breathiness still noted.  Used cup bubble phonation to address more normal resonant phonation with good, prompt carryover to connected speech.  For home practice, clinician reviewed home exercises as practiced during the session with the patient.  Encouraged increased voice use today to reset vocal subsystem balance and patterns.  She was amenable to all suggestions.      Functional goals  Short Term Goals (2 weeks):   1. Complete voice evaluation at the  voice center  Met  2. Complete Modified Barium Swallow Study  Met  3. Pt will be evaluated by an ENT in the voice center  Met     Long Term Goals (4 weeks):    1. She  will demonstrate improved vocal quality and intonation at the conversation level to communicate basic medical and social needs in the functional living environment.  Ongoing  2. She  will utilize compensatory strategies with optimum safety and efficiency of swallowing function on PO intake without overt signs and symptoms of aspiration for regular consistency diet level.  Ongoing    Assessment/POC   Patient presents with severe dysphonia as diagnosed by Jeannie Pagan.  Prognosis for continued improvement is good.  Patient demonstrated significant progress toward goals. Continue home practice with focus on resonant, normal voice.  F/u for 1-2 further sessions of voice therapy if needed.

## 2019-10-28 ENCOUNTER — PATIENT MESSAGE (OUTPATIENT)
Dept: SPEECH THERAPY | Facility: HOSPITAL | Age: 29
End: 2019-10-28

## 2019-10-31 ENCOUNTER — CLINICAL SUPPORT (OUTPATIENT)
Dept: SPEECH THERAPY | Facility: HOSPITAL | Age: 29
End: 2019-10-31
Payer: COMMERCIAL

## 2019-10-31 DIAGNOSIS — R49.0 DYSPHONIA: Primary | ICD-10-CM

## 2019-10-31 DIAGNOSIS — Z98.890 H/O CERVICAL SPINE SURGERY: ICD-10-CM

## 2019-10-31 DIAGNOSIS — M50.121 CERVICAL DISC DISORDER AT C4-C5 LEVEL WITH RADICULOPATHY: ICD-10-CM

## 2019-10-31 PROCEDURE — 92507 TX SP LANG VOICE COMM INDIV: CPT | Mod: GN

## 2019-10-31 NOTE — PROGRESS NOTES
Referring provider: Jeannie Pagan  Reason for visit:  Voice treatment (CPT 97235)  Session #2    History / Subjective   I had the pleasure of seeing Osman Rm for her second treatment session following complete voice evaluation on 10/7/2019.  During that time, improvements were noted on circumlaryngeal massage techniques, resonant voice exercises, and SOVTexercises. Since evaluation, patient reports that her vocal quality waxes and wanes. On today's visit, she feels like her voice has improved but it is still not her baseline voice. She reports practicing the exercises constantly throughout the day and notices immediate improvement but has difficulty carrying it over in to conversation. She also feels constant tightness in her throat and completes CLM throughout the day.     Objective   The primary goal of todays session was to aid patient in improving carryover of baseline normal vocal quality in to conversation.  This was targeted using CLM, resonant, and SOV treatment modalities.    Perceptual/behavioral assessment  On presentation  -CAPE-V Overall Score: 55  -Quality: strain, brief periods of aphonic vocal quality  -Volume: decreased projection  -Pitch: n/a  -Flexibility: none  -Habitual respiratory pattern: breath holding      End of session  -CAPE-V Overall Score: 4  -Quality: WNL, mild trejo, strain  -Volume: appropriate for age and gender  -Pitch: appropriate for age and gender  -Flexibility: appropriate for age and gender  -Habitual respiratory pattern: diaphragmatic     Treatment  Patient most stimulable for decreased breathy/strained vocal quality as well as more efficient breathing patterns using a combination of resonant /m/ humming exercises as well as cup bubble SOVT exercises. Good, consistent carryover to words, phrases, and structured reading passages. Periods of aphonic vocal quality and strain evident in conversation; therefore, patient cued to monitor conversation with clinician and  "independently correct when vocal quality deteriorated. With mod cuing from clinician, patient was stimulable for decreased asthenia. She reported during the session, "this voice is my normal voice." She was recorded practicing the exercises on her phone and encouraged to keep talking throughout the day to aid in generalization of improved carryover. For home practice, clinician reviewed home exercises as practiced during the session with the patient.     Functional goals  Short Term Goals (4 weeks):   1. Complete voice evaluation at the voice center  Met  2. Complete Modified Barium Swallow Study  Met  3. Pt will be evaluated by an ENT in the voice center  Met  4. Patient will demonstrate the ability to increase awareness of voicing behavior through self-monitoring to facilitate generalization in functional speaking situations with 80% accuracy  Met  5. Patient will improve the quality, efficiency, and ease of phonation through resonant and/or airflow exercises from the syllable to conversation level with 80% accuracy.    Long Term Goals (8 weeks):    1. She  will demonstrate improved vocal quality and intonation at the conversation level to communicate basic medical and social needs in the functional living environment.  Ongoing  2. She  will utilize compensatory strategies with optimum safety and efficiency of swallowing function on PO intake without overt signs and symptoms of aspiration for regular consistency diet level.  Ongoing     Assessment/POC   Patient presents with moderate dysphonia as diagnosed by Jeannie Pagan. Prognosis for continued improvement is good.  Patient demonstrated significant progress toward goals. Continue home practice with focus on resonant, normal voice. Recommend 1 further voice therapy sessions to ensure generalization of improved vocal quality.        "

## 2019-11-06 ENCOUNTER — OFFICE VISIT (OUTPATIENT)
Dept: ORTHOPEDICS | Facility: CLINIC | Age: 29
End: 2019-11-06

## 2019-11-06 VITALS
DIASTOLIC BLOOD PRESSURE: 60 MMHG | SYSTOLIC BLOOD PRESSURE: 106 MMHG | BODY MASS INDEX: 29.01 KG/M2 | WEIGHT: 169 LBS | HEART RATE: 76 BPM

## 2019-11-06 DIAGNOSIS — M50.121 CERVICAL DISC DISORDER AT C4-C5 LEVEL WITH RADICULOPATHY: Primary | ICD-10-CM

## 2019-11-06 DIAGNOSIS — Z98.1 S/P CERVICAL SPINAL FUSION: ICD-10-CM

## 2019-11-06 DIAGNOSIS — M50.121 CERVICAL DISC DISORDER AT C4-C5 LEVEL WITH RADICULOPATHY: ICD-10-CM

## 2019-11-06 DIAGNOSIS — R49.0 DYSPHONIA: ICD-10-CM

## 2019-11-06 PROCEDURE — 99213 PR OFFICE/OUTPT VISIT, EST, LEVL III, 20-29 MIN: ICD-10-PCS | Mod: S$GLB,,, | Performed by: ORTHOPAEDIC SURGERY

## 2019-11-06 PROCEDURE — 99213 OFFICE O/P EST LOW 20 MIN: CPT | Mod: S$GLB,,, | Performed by: ORTHOPAEDIC SURGERY

## 2019-11-06 NOTE — PROGRESS NOTES
Subjective:    Patient ID: Osman Rm is a 29 y.o. female.    Chief Complaint: Pain of the Neck (ATTY  Neck is getting a lot better. She has been to speech therapy but she does not feel that it is helping)      History of Present Illness  Patient is here about 3-4 months status post C4-5 ACDF revision of a total disc arthroplasty    Current Medications  Current Outpatient Medications   Medication Sig Dispense Refill    ondansetron (ZOFRAN) 8 MG tablet Take 1 tablet (8 mg total) by mouth every 6 to 8 hours as needed for Nausea. 30 tablet 0    traMADol (ULTRAM) 50 mg tablet Take 1 tablet (50 mg total) by mouth every 6 (six) hours as needed for Pain. 28 tablet 3    oxyCODONE-acetaminophen (PERCOCET)  mg per tablet Take 1 tablet by mouth every 4 to 6 hours as needed for Pain. Script written by Dr. Brennan. (Patient not taking: Reported on 10/22/2019) 40 tablet 0     No current facility-administered medications for this visit.        Allergies  Review of patient's allergies indicates:   Allergen Reactions    Adhesive tape-silicones Hives       Past Medical History  Past Medical History:   Diagnosis Date    Asthma        Surgical History  Past Surgical History:   Procedure Laterality Date    ANTERIOR CERVICAL DISCECTOMY W/ FUSION N/A 6/28/2018    Procedure: DISCECTOMY, SPINE, CERVICAL, ANTERIOR APPROACH, ARTHROPLASTY C4-5;  Surgeon: Andrew Brennan MD;  Location: [facility] OR;  Service: Orthopedics;  Laterality: N/A;    ANTERIOR CERVICAL DISCECTOMY W/ FUSION N/A 8/1/2019    Procedure: DISCECTOMY, SPINE, CERVICAL, ANTERIOR APPROACH, WITH FUSION C4-5;  Surgeon: Andrew Bernnan MD;  Location: [facility] OR;  Service: Orthopedics;  Laterality: N/A;    BONE GRAFT N/A 8/1/2019    Procedure: BONE GRAFT;  Surgeon: Andrew Brennan MD;  Location: [facility] OR;  Service: Orthopedics;  Laterality: N/A;    FUSION OF SPINE WITH INSTRUMENTATION N/A 8/1/2019    Procedure: FUSION, SPINE, WITH INSTRUMENTATION C4-5;  Surgeon: Andrew  DOROTEO Brennan MD;  [facility]: [facility];  Service: Orthopedics;  Laterality: N/A;    medial branch block      SPINAL FUSION      TONSILLECTOMY         Family History:   History reviewed. No pertinent family history.    Social History:   Social History     Socioeconomic History    Marital status:      Spouse name: Not on file    Number of children: Not on file    Years of education: Not on file    Highest education level: Not on file   Occupational History    Not on file   Social Needs    Financial resource strain: Not on file    Food insecurity:     Worry: Not on file     Inability: Not on file    Transportation needs:     Medical: Not on file     Non-medical: Not on file   Tobacco Use    Smoking status: Never Smoker    Smokeless tobacco: Never Used   Substance and Sexual Activity    Alcohol use: No    Drug use: No    Sexual activity: Yes     Partners: Male     Birth control/protection: Abstinence   Lifestyle    Physical activity:     Days per week: Not on file     Minutes per session: Not on file    Stress: Not on file   Relationships    Social connections:     Talks on phone: Not on file     Gets together: Not on file     Attends Church service: Not on file     Active member of club or organization: Not on file     Attends meetings of clubs or organizations: Not on file     Relationship status: Not on file   Other Topics Concern    Not on file   Social History Narrative    Not on file       Date of surgery:  08/01/2019    Review of Systems     General/Constitutional: Chills denies. Fatigue denies. Fever denies. Weight gain denies. Weight loss denies.    Musculoskeletal: Comments: See HPI for details    Skin: Rash denies.    Objective:   Vital Signs:   Vitals:    11/06/19 0930   BP: 106/60   Pulse: 76        Physical Exam    This a well-developed, well nourished patient in no acute distress.  They are alert and oriented and cooperative to examination.  Pt. walks without an antalgic gait.   "    General Examination:     Constitutional: The patient is alert and oriented to lace person and time. Mood is pleasant.     Head/Face: Normal facial features normal eyebrows    Eyes: Normal extraocular motion bilaterally    Lungs: Respirations are equal and unlabored    Gait is coordinated.    Cardiovascular: There are no swelling or varicosities present.    Lymphatic: Negative for adenopathy    Skin: Normal    Neurological: Level of consciousness normal. Oriented to place person and time and situation    Psychiatric: Oriented to time place person and situation    Cervical exam demonstrates continued hoarseness. Incision is well-healed. Range of motion left rotation limited secondary to pain. Right upper trapezius and sternomastoid tenderness palpation with left rotation. Bilateral upper extremities distal neurovascular intact.  - voice remains hoarse and very low in volume    XRAY Report/ Interpretation:  Postop cervical AP and lateral x-rays taken in the office and reviewed with the patient demonstrate a normal postop appearance with excellent interbody bony fusion mass production      Assessment:       1. Cervical disc disorder at C4-C5 level with radiculopathy s/p C4-5 Cervical spine disectomy, arthroplasty and decompression    2. Cervical disc disorder at C4-C5 level with radiculopathy    3. S/P cervical spinal fusion    4. Dysphonia        Plan:       Osman was seen today for pain.    Diagnoses and all orders for this visit:    Cervical disc disorder at C4-C5 level with radiculopathy s/p C4-5 Cervical spine disectomy, arthroplasty and decompression  -     X-Ray Cervical Spine AP And Lateral    Cervical disc disorder at C4-C5 level with radiculopathy  -     X-Ray Cervical Spine AP And Lateral    S/P cervical spinal fusion    Dysphonia         No follow-ups on file.  Ritesh Fuentes, physician's assistant served in the capacity as a "scribe" for this patient encounter  A "face to face" encounter occurred " with Dr. Brennan on this date  The treatment plan and medical decision making is outlined below:  Continue with physical therapy and speech therapy.  Follow up routinely every 8 weeks.  May return to work at 15-20 hours a week performing light/sedentary duty including minimal driving and minimal talking.      This note was created using Dragon voice recognition software that occasionally misinterpreted phrases or words.

## 2019-11-06 NOTE — LETTER
November 6, 2019      [facility]  [address]  [place] [address]  Phone: [phone]  Fax: [phone]       Patient: Osman Rm   YOB: 1990  Date of Visit: 11/06/2019    To Whom It May Concern:    Ms. Rm is almost 4 months status post cervical disc revision and fusion surgery. Unfortunately she remains with some postoperative complications that require some extended postoperative physical therapy and speech therapy.    She may return to work 15-20 hours a week at a light/sedentary duty including minimal driving and very minimal talking.    The length of these restrictions remains indeterminate secondary to the persistent postoperative complications.    Sincerely,              Ritesh Fuentes PA

## 2019-11-19 ENCOUNTER — TELEPHONE (OUTPATIENT)
Dept: SPEECH THERAPY | Facility: HOSPITAL | Age: 29
End: 2019-11-19

## 2019-11-19 ENCOUNTER — PATIENT MESSAGE (OUTPATIENT)
Dept: ORTHOPEDICS | Facility: CLINIC | Age: 29
End: 2019-11-19

## 2020-01-08 ENCOUNTER — OFFICE VISIT (OUTPATIENT)
Dept: ORTHOPEDICS | Facility: CLINIC | Age: 30
End: 2020-01-08
Payer: COMMERCIAL

## 2020-01-08 VITALS — WEIGHT: 155 LBS | BODY MASS INDEX: 26.61 KG/M2 | SYSTOLIC BLOOD PRESSURE: 102 MMHG | DIASTOLIC BLOOD PRESSURE: 68 MMHG

## 2020-01-08 DIAGNOSIS — R49.0 DYSPHONIA: ICD-10-CM

## 2020-01-08 DIAGNOSIS — M50.121 CERVICAL DISC DISORDER AT C4-C5 LEVEL WITH RADICULOPATHY: Primary | ICD-10-CM

## 2020-01-08 DIAGNOSIS — Z98.1 S/P CERVICAL SPINAL FUSION: ICD-10-CM

## 2020-01-08 PROCEDURE — 99213 PR OFFICE/OUTPT VISIT, EST, LEVL III, 20-29 MIN: ICD-10-PCS | Mod: S$GLB,,, | Performed by: ORTHOPAEDIC SURGERY

## 2020-01-08 PROCEDURE — 99213 OFFICE O/P EST LOW 20 MIN: CPT | Mod: S$GLB,,, | Performed by: ORTHOPAEDIC SURGERY

## 2020-01-08 NOTE — PROGRESS NOTES
Subjective:       Patient ID: Osman Rm is a 29 y.o. female.    Chief Complaint: Pain of the Neck (Neck is a lot better but does still hurt. Pain does not radiate. No other treatment. Patient is expecting 7-)      History of Present Illness  Patient is here about 3-4 months status post C4-5 ACDF revision of a total disc arthroplasty.  Has very little to no neck pain or upper extremity radicular symptoms.  However she remains with a very low voice volume.  She is not currently in speech therapy but is performing her speech therapy home exercise program.    Current Medications  Current Outpatient Medications   Medication Sig Dispense Refill    ondansetron (ZOFRAN) 8 MG tablet Take 1 tablet (8 mg total) by mouth every 6 to 8 hours as needed for Nausea. 30 tablet 0     No current facility-administered medications for this visit.        Allergies  Review of patient's allergies indicates:   Allergen Reactions    Adhesive tape-silicones Hives       Past Medical History  Past Medical History:   Diagnosis Date    Asthma        Surgical History  Past Surgical History:   Procedure Laterality Date    ANTERIOR CERVICAL DISCECTOMY W/ FUSION N/A 6/28/2018    Procedure: DISCECTOMY, SPINE, CERVICAL, ANTERIOR APPROACH, ARTHROPLASTY C4-5;  Surgeon: Andrew Brennan MD;  Location: [facility] OR;  Service: Orthopedics;  Laterality: N/A;    ANTERIOR CERVICAL DISCECTOMY W/ FUSION N/A 8/1/2019    Procedure: DISCECTOMY, SPINE, CERVICAL, ANTERIOR APPROACH, WITH FUSION C4-5;  Surgeon: Andrew Brennan MD;  Location: [facility] OR;  Service: Orthopedics;  Laterality: N/A;    BONE GRAFT N/A 8/1/2019    Procedure: BONE GRAFT;  Surgeon: Andrew Brennan MD;  Location: [facility] OR;  Service: Orthopedics;  Laterality: N/A;    FUSION OF SPINE WITH INSTRUMENTATION N/A 8/1/2019    Procedure: FUSION, SPINE, WITH INSTRUMENTATION C4-5;  Surgeon: Andrew Brennan MD;  Location: [facility] OR;  Service: Orthopedics;  Laterality: N/A;    medial branch block       SPINAL FUSION      TONSILLECTOMY         Family History:   History reviewed. No pertinent family history.    Social History:   Social History     Socioeconomic History    Marital status:      Spouse name: Not on file    Number of children: Not on file    Years of education: Not on file    Highest education level: Not on file   Occupational History    Not on file   Social Needs    Financial resource strain: Not on file    Food insecurity:     Worry: Not on file     Inability: Not on file    Transportation needs:     Medical: Not on file     Non-medical: Not on file   Tobacco Use    Smoking status: Never Smoker    Smokeless tobacco: Never Used   Substance and Sexual Activity    Alcohol use: No    Drug use: No    Sexual activity: Yes     Partners: Male     Birth control/protection: Abstinence   Lifestyle    Physical activity:     Days per week: Not on file     Minutes per session: Not on file    Stress: Not on file   Relationships    Social connections:     Talks on phone: Not on file     Gets together: Not on file     Attends Jain service: Not on file     Active member of club or organization: Not on file     Attends meetings of clubs or organizations: Not on file     Relationship status: Not on file   Other Topics Concern    Not on file   Social History Narrative    Not on file       Hospitalization/Major Diagnostic Procedure:     Review of Systems     General/Constitutional:  Chills denies. Fatigue denies. Fever denies. Weight gain denies. Weight loss denies.    Respiratory:  Shortness of breath denies.    Cardiovascular:  Chest pain denies.    Gastrointestinal:  Constipation denies. Diarrhea denies. Nausea denies. Vomiting denies.     Hematology:  Easy bruising denies. Prolonged bleeding denies.     Genitourinary:  Frequent urination denies. Pain in lower back denies. Painful urination denies.     Musculoskeletal:  See HPI for details    Skin:  Rash denies.    Neurologic:   "Dizziness denies. Gait abnormalities denies. Seizures denies. Tingling/Numbess denies.    Psychiatric:  Anxiety denies. Depressed mood denies.     Objective:   Vital Signs:   Vitals:    01/08/20 1000   BP: 102/68        Physical Exam      General Examination:     Constitutional: The patient is alert and oriented to lace person and time. Mood is pleasant.     Head/Face: Normal facial features normal eyebrows    Eyes: Normal extraocular motion bilaterally    Lungs: Respirations are equal and unlabored    Gait is coordinated.    Cardiovascular: There are no swelling or varicosities present.    Lymphatic: Negative for adenopathy    Skin: Normal    Neurological: Level of consciousness normal. Oriented to place person and time and situation    Psychiatric: Oriented to time place person and situation    Cervical exam demonstrates continued hoarseness. Incision is well-healed. Range of motion left rotation mildly limited secondary to pain.  mild Right upper trapezius and sternomastoid tenderness palpation with left rotation. Bilateral upper extremities distal neurovascular intact.  - voice remains hoarse and very low in volume    XRAY Report/ Interpretation:  No new studies today      Assessment:       1. Cervical disc disorder at C4-C5 level with radiculopathy s/p C4-5 Cervical spine disectomy, arthroplasty and decompression    2. S/P cervical spinal fusion    3. Dysphonia        Plan:       Osman was seen today for pain.    Diagnoses and all orders for this visit:    Cervical disc disorder at C4-C5 level with radiculopathy s/p C4-5 Cervical spine disectomy, arthroplasty and decompression    S/P cervical spinal fusion    Dysphonia         Follow up in about 1 year (around 1/8/2021).  Ritesh Fuentes, physician's assistant served in the capacity as a "scribe" for this patient encounter  A "face to face" encounter occurred with Dr. Brennan on this date  The treatment plan and medical decision making is outlined " below:  Patient may return to work without any physical restrictions.  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  Would like to see her back in about a year so I can check on her voice   She may require further treatment with a speech therapist to address her dysphonia    This note was created using Dragon voice recognition software that occasionally misinterpreted phrases or words.

## 2020-01-08 NOTE — LETTER
January 8, 2020      [facility]  [address]  [place] [address]  Phone: [phone]  Fax: [phone]       Patient: Osman Rm   YOB: 1990  Date of Visit: 01/08/2020    To Whom It May Concern:    Janie Rm  was at our office on 01/08/2020.  She may return to work without any physical restrictions as of January 8, 2020.  Mrs Rm has little to no voice volume.  If you have any questions or concerns, or if I can be of further assistance, please do not hesitate to contact me.    Sincerely,      Dr Andrew Brennan

## 2020-01-13 ENCOUNTER — PATIENT MESSAGE (OUTPATIENT)
Dept: OTOLARYNGOLOGY | Facility: CLINIC | Age: 30
End: 2020-01-13

## 2020-01-13 DIAGNOSIS — Z98.890 H/O CERVICAL SPINE SURGERY: ICD-10-CM

## 2020-01-13 DIAGNOSIS — R49.0 DYSPHONIA: Primary | ICD-10-CM

## 2020-02-05 DIAGNOSIS — J38.5 LARYNGEAL SPASM: Primary | ICD-10-CM

## 2020-02-05 DIAGNOSIS — R49.0 MUSCLE TENSION DYSPHONIA: ICD-10-CM

## 2020-02-06 ENCOUNTER — TELEPHONE (OUTPATIENT)
Dept: SPEECH THERAPY | Facility: HOSPITAL | Age: 30
End: 2020-02-06

## 2020-02-10 ENCOUNTER — PATIENT MESSAGE (OUTPATIENT)
Dept: ORTHOPEDICS | Facility: CLINIC | Age: 30
End: 2020-02-10

## 2020-02-26 ENCOUNTER — PATIENT MESSAGE (OUTPATIENT)
Dept: OTOLARYNGOLOGY | Facility: CLINIC | Age: 30
End: 2020-02-26

## 2020-03-02 ENCOUNTER — CLINICAL SUPPORT (OUTPATIENT)
Dept: SPEECH THERAPY | Facility: HOSPITAL | Age: 30
End: 2020-03-02
Payer: COMMERCIAL

## 2020-03-02 DIAGNOSIS — Z98.890 H/O CERVICAL SPINE SURGERY: ICD-10-CM

## 2020-03-02 DIAGNOSIS — R49.0 DYSPHONIA: ICD-10-CM

## 2020-03-02 PROCEDURE — 92507 TX SP LANG VOICE COMM INDIV: CPT | Mod: GN

## 2020-03-02 NOTE — PROGRESS NOTES
"Referring provider: Jeannie Pagan  Reason for visit:  Voice treatment (CPT 10628)  Session #3    History / Subjective   I had the pleasure of seeing Osman Rm for her third treatment session following complete voice evaluation on 10/7/2019.  During last session on, 10/31/2019 significant improvements were noted on CLM, resonant voice, and SOVT treatment modalities. Since last session, patient reports that on some days she can "talk regular" and other days her voice is weak. She reports that she is currently working again, but is mostly completing paperwork because of the amount she relies on her voice to successfully do her job. She reports that she works for an [employer] company, and is required to complete frequently complete parent trainings. She does complete cup bubble SOVT exercises "so that I can talk", but her voice "goes back to this." She reports that she is pregnant and completing this exercise also exacerbated her nausea.     Objective   The primary goal of todays session was to aid patient in improving carryover of baseline normal vocal quality in to conversation.  This was targeted using resonant voice and CTT.     Perceptual/behavioral assessment  On presentation  -CAPE-V Overall Score: 65  -Quality: strain, asthenia, brief periods of aphonic vocal quality  -Volume: decreased projection  -Pitch: n/a  -Flexibility: none  -Habitual respiratory pattern: breath holding      End of session  -CAPE-V Overall Score: 0  -Quality: WNL, mild trejo  -Volume: appropriate for age and gender  -Pitch: appropriate for age and gender  -Flexibility: appropriate for age and gender  -Habitual respiratory pattern: diaphragmatic     Treatment  Patient most stimulable for decreased breathy/strained vocal quality as well as more efficient breathing patterns using a combination of resonant /m/ humming exercises as well as conversation training therapy techniques (i.e., clear speech). Good carryover to words, phrases, " "structured reading passages, and conversation. Patient reported that this was the longest that she has been able to maintain improvement with any exercises. She was encouraged to use vocal resets (i.e., "mhm" "um") frequently throughout her day to aid in continued generalization. For home practice, clinician reviewed home exercises as practiced during the session with the patient.     Functional goals (updated)  Length Status Goal   Long term Ongoing Patient and clinician will facilitate changes in vocal function in order to restore functional use of voice for daily occupational, social, and emotional demands.    Long term Ongoing Patient will re-establish phonation with adequate balance of airflow and resonance with decreased muscle tension.    Long term Ongoing Patient will improve coordination of respiration and phonation for efficient vocal production at a conversational level.    Short term Met Patient will reduce vocal effort and fatigue by decreasing upper body tension as evidenced by a decrease in symptoms and lack of observable/palpable signs of hyperkinetic muscular behaviors.   Short term Ongoing Patient will improve the quality, efficiency, and ease of phonation through resonant and/or airflow exercises from the syllable to conversation level with 80% accuracy.   Short term Met Patient will identify the sensations associated with muscle relaxation in the abdominal, thoracic, neck and facial areas during efficient phonation with minimal clinician cue.    Short term Ongoing Patient will demonstrate the ability to increase awareness of voicing behavior through self-monitoring to facilitate generalization in functional speaking situations with 80% accuracy.      Assessment/POC   Patient presents with moderate dysphonia as diagnosed by Jeannie Pagan. Prognosis for continued improvement is good.   Recommend 1-2 further voice therapy sessions over 6-8 weeks to ensure carryover of improved vocal quality to " conversation.

## 2020-03-17 ENCOUNTER — TELEPHONE (OUTPATIENT)
Dept: SPEECH THERAPY | Facility: HOSPITAL | Age: 30
End: 2020-03-17

## 2020-07-29 ENCOUNTER — HOSPITAL ENCOUNTER (EMERGENCY)
Facility: HOSPITAL | Age: 30
Discharge: HOME | End: 2020-07-29
Attending: EMERGENCY MEDICINE
Payer: COMMERCIAL

## 2020-07-29 ENCOUNTER — TELEPHONE (OUTPATIENT)
Dept: ORTHOPEDICS | Facility: CLINIC | Age: 30
End: 2020-07-29

## 2020-07-29 VITALS
OXYGEN SATURATION: 97 % | HEART RATE: 99 BPM | TEMPERATURE: 99 F | DIASTOLIC BLOOD PRESSURE: 69 MMHG | BODY MASS INDEX: 27.93 KG/M2 | WEIGHT: 162.69 LBS | RESPIRATION RATE: 20 BRPM | SYSTOLIC BLOOD PRESSURE: 133 MMHG

## 2020-07-29 DIAGNOSIS — M54.50 ACUTE LEFT-SIDED LOW BACK PAIN WITHOUT SCIATICA: ICD-10-CM
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
## 2020-07-29 DIAGNOSIS — S16.1XXA STRAIN OF NECK MUSCLE, INITIAL ENCOUNTER: ICD-10-CM

## 2020-07-29 DIAGNOSIS — M25.512 ACUTE PAIN OF LEFT SHOULDER: ICD-10-CM

## 2020-07-29 PROBLEM — E55.9 VITAMIN D DEFICIENCY: Status: ACTIVE | Noted: 2018-07-19

## 2020-07-29 PROBLEM — F41.9 ANXIETY DISORDER, UNSPECIFIED: Status: ACTIVE | Noted: 2017-11-01

## 2020-07-29 PROBLEM — D50.9 IRON DEFICIENCY ANEMIA: Status: ACTIVE | Noted: 2018-07-19

## 2020-07-29 PROBLEM — E04.2 MULTIPLE THYROID NODULES: Status: ACTIVE | Noted: 2019-07-10

## 2020-07-29 LAB
B-HCG UR QL: NEGATIVE
CTP QC/QA: YES

## 2020-07-29 PROCEDURE — 81025 URINE PREGNANCY TEST: CPT | Performed by: PHYSICIAN ASSISTANT

## 2020-07-29 PROCEDURE — 99284 EMERGENCY DEPT VISIT MOD MDM: CPT | Mod: 25

## 2020-07-29 PROCEDURE — 63600175 PHARM REV CODE 636 W HCPCS: Performed by: PHYSICIAN ASSISTANT

## 2020-07-29 PROCEDURE — 96372 THER/PROPH/DIAG INJ SC/IM: CPT

## 2020-07-29 PROCEDURE — 25000003 PHARM REV CODE 250: Performed by: PHYSICIAN ASSISTANT

## 2020-07-29 RX ORDER — SERTRALINE HYDROCHLORIDE 50 MG/1
50 TABLET, FILM COATED ORAL
COMMUNITY
Start: 2020-07-15 | End: 2020-08-03

## 2020-07-29 RX ORDER — NAPROXEN 500 MG/1
500 TABLET ORAL
COMMUNITY
Start: 2020-07-09 | End: 2020-07-29

## 2020-07-29 RX ORDER — LIDOCAINE 50 MG/G
1 PATCH TOPICAL
Status: DISCONTINUED | OUTPATIENT
Start: 2020-07-29 | End: 2020-07-29

## 2020-07-29 RX ORDER — NAPROXEN 500 MG/1
500 TABLET ORAL 2 TIMES DAILY WITH MEALS
Qty: 20 TABLET | Refills: 0 | Status: SHIPPED | OUTPATIENT
Start: 2020-07-29 | End: 2020-08-08

## 2020-07-29 RX ORDER — KETOROLAC TROMETHAMINE 30 MG/ML
30 INJECTION, SOLUTION INTRAMUSCULAR; INTRAVENOUS
Status: COMPLETED | OUTPATIENT
Start: 2020-07-29 | End: 2020-07-29

## 2020-07-29 RX ORDER — LIDOCAINE 50 MG/G
1 PATCH TOPICAL DAILY
Qty: 15 PATCH | Refills: 0 | Status: SHIPPED | OUTPATIENT
Start: 2020-07-29 | End: 2020-08-19

## 2020-07-29 RX ORDER — ORPHENADRINE CITRATE 100 MG/1
100 TABLET, EXTENDED RELEASE ORAL 2 TIMES DAILY
Qty: 20 TABLET | Refills: 0 | Status: SHIPPED | OUTPATIENT
Start: 2020-07-29 | End: 2020-08-03

## 2020-07-29 RX ORDER — LIDOCAINE 50 MG/G
1 PATCH TOPICAL ONCE
Status: DISCONTINUED | OUTPATIENT
Start: 2020-07-29 | End: 2020-07-29 | Stop reason: HOSPADM

## 2020-07-29 RX ADMIN — LIDOCAINE 1 PATCH: 50 PATCH TOPICAL at 02:07

## 2020-07-29 RX ADMIN — KETOROLAC TROMETHAMINE 30 MG: 30 INJECTION, SOLUTION INTRAMUSCULAR at 02:07

## 2020-07-29 NOTE — TELEPHONE ENCOUNTER
Patient called stating she just fell down stairs and hurt her neck and back. She asked if she should go to [facility] or [facility]. [facility] [place], since they would be able to see her records, op-reports etc. appt made for ER followup 8/3.

## 2020-07-29 NOTE — ED NOTES
Pt presents to the ED c/o pain on the Lt side of body from shoulder to hip s/p trip and fall on stairway

## 2020-07-29 NOTE — ED PROVIDER NOTES
Encounter Date: 7/29/2020       History     Chief Complaint   Patient presents with    Fall     reports fell pta. reports slipped down wet steps. reports hitting head, left arm, left hip, left back. ambulatory to triage      Osman Rm, a 29 y.o. female .ammph     She presents to the ED evaluation after a slip and fall down wet steps.  Patient states that she slipped on approximately step 5 and slid down on her back through the rest of the stairs.  Has complaint of left arm, neck, lower back pain as well as headache.  Denies LOC.  No treatments tried prior to arrival.  No previous history of fracture to sites of complaint.          Review of patient's allergies indicates:   Allergen Reactions    Adhesive tape-silicones Hives     Past Medical History:   Diagnosis Date    Asthma      Past Surgical History:   Procedure Laterality Date    ANTERIOR CERVICAL DISCECTOMY W/ FUSION N/A 6/28/2018    Procedure: DISCECTOMY, SPINE, CERVICAL, ANTERIOR APPROACH, ARTHROPLASTY C4-5;  Surgeon: Andrew Brennan MD;  Location: [facility] OR;  Service: Orthopedics;  Laterality: N/A;    ANTERIOR CERVICAL DISCECTOMY W/ FUSION N/A 8/1/2019    Procedure: DISCECTOMY, SPINE, CERVICAL, ANTERIOR APPROACH, WITH FUSION C4-5;  Surgeon: Andrew Brennan MD;  Location: [facility] OR;  Service: Orthopedics;  Laterality: N/A;    BONE GRAFT N/A 8/1/2019    Procedure: BONE GRAFT;  Surgeon: Andrew Brennan MD;  Location: [facility] OR;  Service: Orthopedics;  Laterality: N/A;    FUSION OF SPINE WITH INSTRUMENTATION N/A 8/1/2019    Procedure: FUSION, SPINE, WITH INSTRUMENTATION C4-5;  Surgeon: Andrew Brennan MD;  Location: [facility] OR;  Service: Orthopedics;  Laterality: N/A;    medial branch block      SPINAL FUSION      TONSILLECTOMY       No family history on file.  Social History     Tobacco Use    Smoking status: Never Smoker    Smokeless tobacco: Never Used   Substance Use Topics    Alcohol use: No    Drug use: No     Review of Systems    Constitutional: Negative for fever.   Respiratory: Negative for shortness of breath.    Cardiovascular: Negative for chest pain.   Gastrointestinal: Negative for abdominal pain, nausea and vomiting.   Musculoskeletal: Positive for back pain, neck pain and neck stiffness.   Skin: Positive for color change.   Allergic/Immunologic: Negative for immunocompromised state.   Neurological: Negative for weakness.   All other systems reviewed and are negative.      Physical Exam     Initial Vitals   BP Pulse Resp Temp SpO2   07/29/20 1409 07/29/20 1409 07/29/20 1409 07/29/20 1407 07/29/20 1409   133/69 99 20 99.1 °F (37.3 °C) 97 %      MAP       --                Physical Exam    Nursing note and vitals reviewed.  Constitutional: She appears well-developed and well-nourished. She is not diaphoretic. No distress.   HENT:   Head: Normocephalic and atraumatic.   Right Ear: External ear normal.   Left Ear: External ear normal.   Nose: Nose normal.   Eyes: Conjunctivae and EOM are normal.   Neck: Normal range of motion and full passive range of motion without pain. No spinous process tenderness present.       Cardiovascular: Normal rate and regular rhythm.   Pulmonary/Chest: Breath sounds normal. No respiratory distress. She has no wheezes. She has no rhonchi. She has no rales.   Abdominal: Soft. Bowel sounds are normal.   Musculoskeletal: Normal range of motion.      Left shoulder: She exhibits tenderness. She exhibits normal range of motion, no bony tenderness, no swelling, no effusion, no crepitus, no deformity, no laceration, no pain, no spasm, normal pulse and normal strength.        Back:       Comments: Left shoulder:  TTP over deltoid.  NVI, NAROM.     Neurological: She is alert and oriented to person, place, and time. No cranial nerve deficit or sensory deficit. GCS score is 15. GCS eye subscore is 4. GCS verbal subscore is 5. GCS motor subscore is 6.   Skin: Skin is warm and dry. Capillary refill takes less than 2  seconds. No rash noted. No erythema.   Psychiatric: She has a normal mood and affect. Thought content normal.         ED Course   Procedures  Labs Reviewed   POCT URINE PREGNANCY          Imaging Results          X-Ray Lumbar Spine Ap And Lateral (Final result)  Result time 07/29/20 14:57:30    Final result by Dalton Newell MD (07/29/20 14:57:30)                 Impression:      Postoperative findings at L5-S1.  No compression fracture or radiographic evidence of listhesis within this portion of the spine.      Electronically signed by: Dalton Newell MD  Date:    07/29/2020  Time:    14:57             Narrative:    EXAMINATION:  XR LUMBAR SPINE AP AND LATERAL    CLINICAL HISTORY:  Back pain or radiculopathy, trauma;    TECHNIQUE:  AP, lateral and spot images were performed of the lumbar spine.    COMPARISON:  None    FINDINGS:  Five vertebrae of lumbar configuration are demonstrated.  There are post interventional findings at L5-S1 including radiopaque markers within the disc space and angled fully threaded metal screws.  The lumbar vertebral bodies otherwise are in lordotic alignment and are normal in height with unremarkable radiographic appearance of the remaining intervertebral disc spaces of the lumbar spine.                                 Medical Decision Making:   Initial Assessment:   Fall, back, shoulder and neck pain  Differential Diagnosis:   Fracture, muscular strain, herniated disc, abrasion  Clinical Tests:   Radiological Study: Ordered and Reviewed  ED Management:  Patient presents to the ER for evaluation neck, lower back, left arm pain after fall.  Patient is neurovascularly intact.  Denies any inadvertent loss of bowel or bladder.  Tenderness to palpation over spinous process area of lumbar spine.  X-ray today shows no acute abnormality.  Patient was given information on symptomatic control.  States that she has an appointment already set up with her orthopedic doctor for follow-up.  Given  reasons for return and patient verbalized understanding and agreement with plan.                                 Clinical Impression:       ICD-10-CM ICD-9-CM   1. Fall, initial encounter  W19.XXXA E888.9   2. Strain of neck muscle, initial encounter  S16.1XXA 847.0   3. Acute left-sided low back pain without sciatica  M54.5 724.2   4. Acute pain of left shoulder  M25.512 719.41

## 2020-08-03 ENCOUNTER — OFFICE VISIT (OUTPATIENT)
Dept: ORTHOPEDICS | Facility: CLINIC | Age: 30
End: 2020-08-03
Payer: COMMERCIAL

## 2020-08-03 VITALS — SYSTOLIC BLOOD PRESSURE: 102 MMHG | WEIGHT: 160 LBS | DIASTOLIC BLOOD PRESSURE: 58 MMHG | BODY MASS INDEX: 27.46 KG/M2

## 2020-08-03 DIAGNOSIS — M46.06 SPINAL ENTHESOPATHY OF LUMBAR REGION: ICD-10-CM

## 2020-08-03 DIAGNOSIS — Z98.1 HISTORY OF FUSION OF CERVICAL SPINE: ICD-10-CM

## 2020-08-03 DIAGNOSIS — M54.16 LUMBAR RADICULITIS: ICD-10-CM

## 2020-08-03 DIAGNOSIS — M54.2 CERVICAL PAIN (NECK): Primary | ICD-10-CM

## 2020-08-03 DIAGNOSIS — M54.50 LOW BACK PAIN AT MULTIPLE SITES: ICD-10-CM

## 2020-08-03 DIAGNOSIS — Z98.1 HISTORY OF LUMBAR FUSION: ICD-10-CM

## 2020-08-03 DIAGNOSIS — M54.12 CERVICAL RADICULITIS: ICD-10-CM

## 2020-08-03 PROCEDURE — 99214 OFFICE O/P EST MOD 30 MIN: CPT | Mod: 25,S$GLB,, | Performed by: ORTHOPAEDIC SURGERY

## 2020-08-03 PROCEDURE — 96372: ICD-10-PCS | Mod: S$GLB,,, | Performed by: ORTHOPAEDIC SURGERY

## 2020-08-03 PROCEDURE — 99214 PR OFFICE/OUTPT VISIT, EST, LEVL IV, 30-39 MIN: ICD-10-PCS | Mod: 25,S$GLB,, | Performed by: ORTHOPAEDIC SURGERY

## 2020-08-03 PROCEDURE — 3008F PR BODY MASS INDEX (BMI) DOCUMENTED: ICD-10-PCS | Mod: S$GLB,,, | Performed by: ORTHOPAEDIC SURGERY

## 2020-08-03 PROCEDURE — 96372 THER/PROPH/DIAG INJ SC/IM: CPT | Mod: S$GLB,,, | Performed by: ORTHOPAEDIC SURGERY

## 2020-08-03 PROCEDURE — 3008F BODY MASS INDEX DOCD: CPT | Mod: S$GLB,,, | Performed by: ORTHOPAEDIC SURGERY

## 2020-08-03 RX ORDER — METHYLPREDNISOLONE ACETATE 40 MG/ML
40 INJECTION, SUSPENSION INTRA-ARTICULAR; INTRALESIONAL; INTRAMUSCULAR; SOFT TISSUE
Status: DISCONTINUED | OUTPATIENT
Start: 2020-08-03 | End: 2020-08-03 | Stop reason: HOSPADM

## 2020-08-03 RX ADMIN — METHYLPREDNISOLONE ACETATE 40 MG: 40 INJECTION, SUSPENSION INTRA-ARTICULAR; INTRALESIONAL; INTRAMUSCULAR; SOFT TISSUE at 01:08

## 2020-08-03 NOTE — PROCEDURES
Depo Medrol IM Injection LT glute    Date/Time: 8/3/2020 1:45 PM  Performed by: Andrew Brennan MD  Authorized by: Andrew Brennan MD     Consent Done?:  Yes (Verbal)  Timeout: prior to procedure the correct patient, procedure, and site was verified    Indications:  Pain  Site marked: the procedure site was marked    Timeout: prior to procedure the correct patient, procedure, and site was verified    Location: Depo Medrol IM Injection LT glute.  Prep: patient was prepped and draped in usual sterile fashion    Medications:  40 mg methylPREDNISolone acetate 40 mg/mL; 40 mg methylPREDNISolone acetate 40 mg/mL  Patient tolerance:  Patient tolerated the procedure well with no immediate complications

## 2020-08-03 NOTE — PROGRESS NOTES
Subjective:       Patient ID: Osman Rm is a 29 y.o. female.    Chief Complaint: Pain of the Neck (She was seen at [facility] Wednesday. Lumbar x ray done but not of neck. 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 Neck pain radiates to left shoulder with headaches.) and Pain of the Lumbar Spine (Since falling her lumbar hurts and radiates down left leg to the foot with aching.)      History of Present Illness  Patient is here for re-evaluation.    Has a history of C4-5 ACDF and previous L5-S1 anterior lumbar interbody fusion.    Patient states that she was doing great.  Unfortunately 5 days ago she slipped and fell on some concrete steps landing on her her left side including her back arm and head/neck area.  She sat there for quite some time and then was able to get up and drive herself to the emergency room right away.  They did some x-rays and evaluated her and advised her to follow up with us.    Neck pain is midline and right without any upper extremity radicular symptoms.  Lumbar spine is midline and left radiating down the left lower extremity to the foot.    Current Medications  Current Outpatient Medications   Medication Sig Dispense Refill    lidocaine (LIDODERM) 5 % Place 1 patch onto the skin once daily. Remove & Discard patch within 12 hours or as directed by MD 15 patch 0    naproxen (NAPROSYN) 500 MG tablet Take 1 tablet (500 mg total) by mouth 2 (two) times daily with meals. for 10 days 20 tablet 0    ondansetron (ZOFRAN) 8 MG tablet Take 1 tablet (8 mg total) by mouth every 6 to 8 hours as needed for Nausea. 30 tablet 0     No current facility-administered medications for this visit.        Allergies  Review of patient's allergies indicates:   Allergen Reactions    Adhesive tape-silicones Hives       Past Medical History  Past Medical History:   Diagnosis Date    Asthma        Surgical History  Past Surgical History:   Procedure Laterality Date    ANTERIOR  CERVICAL DISCECTOMY W/ FUSION N/A 6/28/2018    Procedure: DISCECTOMY, SPINE, CERVICAL, ANTERIOR APPROACH, ARTHROPLASTY C4-5;  Surgeon: Andrew Brennan MD;  Location: [facility] OR;  Service: Orthopedics;  Laterality: N/A;    ANTERIOR CERVICAL DISCECTOMY W/ FUSION N/A 8/1/2019    Procedure: DISCECTOMY, SPINE, CERVICAL, ANTERIOR APPROACH, WITH FUSION C4-5;  Surgeon: Andrew Brennan MD;  Location: [facility] OR;  Service: Orthopedics;  Laterality: N/A;    BONE GRAFT N/A 8/1/2019    Procedure: BONE GRAFT;  Surgeon: Andrew Brennan MD;  Location: [facility] OR;  Service: Orthopedics;  Laterality: N/A;    FUSION OF SPINE WITH INSTRUMENTATION N/A 8/1/2019    Procedure: FUSION, SPINE, WITH INSTRUMENTATION C4-5;  Surgeon: Andrew Brennan MD;  Location: [facility] OR;  Service: Orthopedics;  Laterality: N/A;    medial branch block      SPINAL FUSION      TONSILLECTOMY         Family History:   History reviewed. No pertinent family history.    Social History:   Social History     Socioeconomic History    Marital status:      Spouse name: Not on file    Number of children: Not on file    Years of education: Not on file    Highest education level: Not on file   Occupational History    Not on file   Social Needs    Financial resource strain: Not on file    Food insecurity     Worry: Not on file     Inability: Not on file    Transportation needs     Medical: Not on file     Non-medical: Not on file   Tobacco Use    Smoking status: Never Smoker    Smokeless tobacco: Never Used   Substance and Sexual Activity    Alcohol use: No    Drug use: No    Sexual activity: Yes     Partners: Male     Birth control/protection: Abstinence   Lifestyle    Physical activity     Days per week: Not on file     Minutes per session: Not on file    Stress: Not on file   Relationships    Social connections     Talks on phone: Not on file     Gets together: Not on file     Attends [employer] service: Not on file     Active member of club or  organization: Not on file     Attends meetings of clubs or organizations: Not on file     Relationship status: Not on file   Other Topics Concern    Not on file   Social History Narrative    Not on file       Hospitalization/Major Diagnostic Procedure:     Review of Systems     General/Constitutional:  Chills denies. Fatigue denies. Fever denies. Weight gain denies. Weight loss denies.    Respiratory:  Shortness of breath denies.    Cardiovascular:  Chest pain denies.    Gastrointestinal:  Constipation denies. Diarrhea denies. Nausea denies. Vomiting denies.     Hematology:  Easy bruising denies. Prolonged bleeding denies.     Genitourinary:  Frequent urination denies. Pain in lower back denies. Painful urination denies.     Musculoskeletal:  See HPI for details    Skin:  Rash denies.    Neurologic:  Dizziness denies. Gait abnormalities denies. Seizures denies. Tingling/Numbess denies.    Psychiatric:  Anxiety denies. Depressed mood denies.     Objective:   Vital Signs:   Vitals:    08/03/20 1405   BP: (!) 102/58        Physical Exam      General Examination:     Constitutional: The patient is alert and oriented to lace person and time. Mood is pleasant.     Head/Face: Normal facial features normal eyebrows    Eyes: Normal extraocular motion bilaterally    Lungs: Respirations are equal and unlabored    Gait is coordinated.    Cardiovascular: There are no swelling or varicosities present.    Lymphatic: Negative for adenopathy    Skin: Normal    Neurological: Level of consciousness normal. Oriented to place person and time and situation    Psychiatric: Oriented to time place person and situation    Lumbar exam:  Mild antalgic and guarded gait.  Antalgic sit to stand.  Tenderness with palpation of the bilateral paraspinal muscles of the lumbar and lumbosacral spine.  Bilateral lower extremities demonstrate full active range of motion, equal and symmetric DTRs, normal strength except for some guarding due to pain on  "the left.  And also a positive straight leg raise maneuver on the left.    Cervical exam demonstrates guarded in decreased range of motion in all planes.  Tenderness palpation with muscle spasm bilateral sternocleidomastoid and upper trapezius.  Bilateral upper extremities are distal neurovascular intact.    XRAY Report/ Interpretation:  Cervical AP and lateral x-rays taken in the office today reviewed the patient demonstrate previous C4-5 ACDF which is stable and solid.  There is some degenerative change adjacent lead at C5-6.    Lumbar AP and lateral x-rays that were taken in the emergency room demonstrate a previous L5-S1 anterior lumbar interbody fusion which is solid and stable.  No acute or new bony abnormality noted.      Assessment:       1. Cervical pain (neck)    2. History of fusion of cervical spine    3. Low back pain at multiple sites    4. History of lumbar fusion        Plan:       Osman was seen today for pain and pain.    Diagnoses and all orders for this visit:    Cervical pain (neck)  -     X-Ray Cervical Spine AP And Lateral    History of fusion of cervical spine  -     X-Ray Cervical Spine AP And Lateral    Low back pain at multiple sites    History of lumbar fusion         No follow-ups on file.  Ritesh Fuentes, physician's assistant served in the capacity as a "scribe" for this patient encounter  A "face to face" encounter occurred with Dr. Brennan on this date  The treatment plan and medical decision making is outlined below:  At this time secondary to the amount of pain that she is currently an and the disability that it causes and the fact that she has had both cervical and lumbar surgeries in the past, we recommend an updated cervical MRI without contrast an updated lumbar MRI both with and without contrast.  Secondary to the acute pain and inflammation she was given Depo-Medrol IM 80 mg today which is safer breast-feeding.    This note was created using Dragon voice recognition " software that occasionally misinterpreted phrases or words.

## 2020-08-07 ENCOUNTER — HOSPITAL ENCOUNTER (OUTPATIENT)
Dept: RADIOLOGY | Facility: HOSPITAL | Age: 30
Discharge: HOME | End: 2020-08-07
Attending: ORTHOPAEDIC SURGERY
Payer: COMMERCIAL

## 2020-08-07 DIAGNOSIS — Z98.1 HISTORY OF LUMBAR FUSION: ICD-10-CM

## 2020-08-07 DIAGNOSIS — M54.16 LUMBAR RADICULITIS: ICD-10-CM

## 2020-08-07 PROCEDURE — 72148 MRI LUMBAR SPINE W/O DYE: CPT | Mod: TC,PO

## 2020-08-07 PROCEDURE — 72141 MRI NECK SPINE W/O DYE: CPT | Mod: TC,PO

## 2020-08-14 ENCOUNTER — TELEPHONE (OUTPATIENT)
Dept: ORTHOPEDICS | Facility: CLINIC | Age: 30
End: 2020-08-14

## 2020-08-14 DIAGNOSIS — Z98.1 HISTORY OF LUMBAR FUSION: Primary | ICD-10-CM

## 2020-08-14 NOTE — TELEPHONE ENCOUNTER
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 Call her back at [phone].

## 2020-08-19 ENCOUNTER — OFFICE VISIT (OUTPATIENT)
Dept: ORTHOPEDICS | Facility: CLINIC | Age: 30
End: 2020-08-19
Payer: COMMERCIAL

## 2020-08-19 VITALS — BODY MASS INDEX: 28.32 KG/M2 | SYSTOLIC BLOOD PRESSURE: 100 MMHG | DIASTOLIC BLOOD PRESSURE: 60 MMHG | WEIGHT: 165 LBS

## 2020-08-19 DIAGNOSIS — S16.1XXA ACUTE STRAIN OF NECK MUSCLE, INITIAL ENCOUNTER: ICD-10-CM

## 2020-08-19 DIAGNOSIS — Z98.1 HISTORY OF FUSION OF CERVICAL SPINE: Primary | ICD-10-CM

## 2020-08-19 DIAGNOSIS — S39.012D LUMBAR STRAIN, SUBSEQUENT ENCOUNTER: ICD-10-CM

## 2020-08-19 DIAGNOSIS — Z91.81 HISTORY OF FALL: ICD-10-CM

## 2020-08-19 DIAGNOSIS — M50.30 BULGE OF CERVICAL DISC WITHOUT MYELOPATHY: ICD-10-CM

## 2020-08-19 DIAGNOSIS — Z98.1 HISTORY OF LUMBAR FUSION: ICD-10-CM

## 2020-08-19 PROCEDURE — 99213 PR OFFICE/OUTPT VISIT, EST, LEVL III, 20-29 MIN: ICD-10-PCS | Mod: S$GLB,,, | Performed by: ORTHOPAEDIC SURGERY

## 2020-08-19 PROCEDURE — 3008F PR BODY MASS INDEX (BMI) DOCUMENTED: ICD-10-PCS | Mod: S$GLB,,, | Performed by: ORTHOPAEDIC SURGERY

## 2020-08-19 PROCEDURE — 3008F BODY MASS INDEX DOCD: CPT | Mod: S$GLB,,, | Performed by: ORTHOPAEDIC SURGERY

## 2020-08-19 PROCEDURE — 99213 OFFICE O/P EST LOW 20 MIN: CPT | Mod: S$GLB,,, | Performed by: ORTHOPAEDIC SURGERY

## 2020-08-19 RX ORDER — CLINDAMYCIN HYDROCHLORIDE 150 MG/1
150 CAPSULE ORAL
COMMUNITY
Start: 2020-08-15 | End: 2020-08-19

## 2020-08-19 RX ORDER — DICLOFENAC SODIUM 10 MG/G
2 GEL TOPICAL 2 TIMES DAILY
Status: SHIPPED | OUTPATIENT
Start: 2020-08-19 | End: 2020-09-03

## 2020-08-19 NOTE — PATIENT INSTRUCTIONS
Neck Exercises: Active Neck Rotation    To start, lie on your back, knees bent and feet flat on the floor. Keep your ears, shoulders, and hips aligned, but dont press your lower back to the floor. Rest your hands on your pelvis. Breathe deeply and relax.  · Use your neck muscles to turn your head to one side until you feel a stretch in the muscles.  · Hold for 5 seconds. Then turn to the other side.  · Repeat 5 times on each side.  Note: Keep your shoulders on the floor. Dont lift or tuck your chin as you turn your head.  Date Last Reviewed: 8/16/2015  © 4475-1236 [employer]. [address] [place] [address]. All rights reserved. This information is not intended as a substitute for professional medical care. Always follow your healthcare professional's instructions.        Neck Exercises: Arm Lift            To start, lie on your back, knees bent and feet flat on the floor. Keep your ears, shoulders, and hips aligned, but dont press your lower back to the floor. Rest your hands on your pelvis. Breathe deeply and relax. Tighten the abdominal muscles to keep the back from arching.  · Raise one arm overhead, then lower it. As you lower that arm, raise the other arm.  · Continue to move both arms in slow, smooth arcs. Keep your arms straight and your head and neck relaxed.  · Repeat 10 times with each arm.  For your safety, check with your healthcare provider before starting an exercise program.   Date Last Reviewed: 8/16/2015 © 2000-2017 [employer]. [address] [place] [address]. All rights reserved. This information is not intended as a substitute for professional medical care. Always follow your healthcare professional's instructions.        Neck Exercises: Passive Neck Rotation    To start, lie on your back, knees bent and feet flat on the floor. Keep your ears, shoulders, and hips aligned, but dont press your lower back to the floor. Rest your hands on your  pelvis. Breathe deeply and relax.  · With your neck relaxed, place the palm of one hand on your forehead. Use your hand to turn your head to one side until you feel a stretch in the neck muscles. Do not push through pain.  · Hold for 5 seconds. Then turn to the other side.  · Repeat 5 times on each side.   Note: Keep your shoulders on the floor. Dont lift your chin as you turn your head.  Date Last Reviewed: 8/16/2015 © 2000-2017 [employer]. [address] [place] [address]. All rights reserved. This information is not intended as a substitute for professional medical care. Always follow your healthcare professional's instructions.        Exercises: Neck Isometrics  To start, sit in a chair with your feet flat on the floor. Your weight should be slightly forward so that youre balanced evenly on your buttocks. Relax your shoulders and keep your head level. Using a chair with arms may help you keep your balance.       1. Press your palm against your forehead. Resist with your neck muscles. Hold for 10 seconds. Relax. Repeat 5 times.  2. Do the exercise again, pressing on the side of your head. Repeat 5 times. Switch sides.  3. Do the exercise again, pressing on the back of your head. Repeat 5 times.  For your safety, check with your healthcare provider before starting an exercise program.   Date Last Reviewed: 8/16/2015 © 2000-2017 [employer]. [address] [place] [address]. All rights reserved. This information is not intended as a substitute for professional medical care. Always follow your healthcare professional's instructions.        Exercises to Strengthen Your Lower Back  Strong lower back and abdominal muscles work together to support your spine. The exercises below will help strengthen the lower back. It is important that you begin exercising slowly and increase levels gradually.  Always begin any exercise program with stretching. If you feel pain while  doing any of these exercises, stop and talk to your doctor about a more specific exercise program that better suits your condition.   Low back stretch  The point of stretching is to make you more flexible and increase your range of motion. Stretch only as much as you are able. Stretch slowly. Do not push your stretch to the limit. If at any point you feel pain while stretching, this is your (temporary) limit.  · Lie on your back with your knees bent and both feet on the ground.  · Slowly raise your left knee to your chest as you flatten your lower back against the floor. Hold for 5 seconds.  · Relax and repeat the exercise with your right knee.  · Do 10 of these exercises for each leg.  · Repeat hugging both knees to your chest at the same time.  Building lower back strength  Start your exercise routine with 10 to 30 minutes a day, 1 to 3 times a day.  Initial exercises  Lying on your back:  1. Ankle pumps: Move your foot up and down, towards your head, and then away. Repeat 10 times with each foot.  2. Heel slides: Slowly bend your knee, drawing the heel of your foot towards you. Then slide your heel/foot from you, straightening your knee. Do not lift your foot off the floor (this is not a leg lift).  3. Abdominal contraction: Bend your knees and put your hands on your stomach. Tighten your stomach muscles. Hold for 5 seconds, then relax. Repeat 10 times.  4. Straight leg raise: Bend one leg at the knee and keep the other leg straight. Tighten your stomach muscles. Slowly lift your straight leg 6 to 12 inches off the floor and hold for up to 5 seconds. Repeat 10 times on each side.  Standin. Wall squats: Stand with your back against the wall. Move your feet about 12 inches away from the wall. Tighten your stomach muscles, and slowly bend your knees until they are at about a 45 degree angle. Do not go down too far. Hold about 5 seconds. Then slowly return to your starting position. Repeat 10 times.  2. Heel  raises: Stand facing the wall. Slowly raise the heels of your feet up and down, while keeping your toes on the floor. If you have trouble balancing, you can touch the wall with your hands. Repeat 10 times.  More advanced exercises  When you feel comfortable enough, try these exercises.  1. Kneeling lumbar extension: Begin on your hands and knees. At the same time, raise and straighten your right arm and left leg until they are parallel to the ground. Hold for 2 seconds and come back slowly to a starting position. Repeat with left arm and right leg, alternating 10 times.  2. Prone lumbar extension: Lie face down, arms extended overhead, palms on the floor. At the same time, raise your right arm and left leg as high as comfortably possible. Hold for 10 seconds and slowly return to start. Repeat with left arm and right leg, alternating 10 times. Gradually build up to 20 times. (Advanced: Repeat this exercise raising both arms and both legs a few inches off the floor at the same time. Hold for 5 seconds and release.)  3. Pelvic tilt: Lie on the floor on your back with your knees bent at 90 degrees. Your feet should be flat on the floor. Inhale, exhale, then slowly contract your abdominal muscles bringing your navel toward your spine. Let your pelvis rock back until your lower back is flat on the floor. Hold for 10 seconds while breathing smoothly.  4. Abdominal crunch: Perform a pelvic tilt (above) flattening your lower back against the floor. Holding the tension in your abdominal muscles, take another breath and raise your shoulder blades off the ground (this is not a full sit-up). Keep your head in line with your body (dont bend your neck forward). Hold for 2 seconds, then slowly lower.  Date Last Reviewed: 6/1/2016  © 3831-5291 [employer]. [address] [place] [address], [place] [address]. All rights reserved. This information is not intended as a substitute for professional medical care. Always follow your  healthcare professional's instructions.        Lumbar Extension (Flexibility)    4. Lie face down on your stomach, forehead on the floor. You can lie on a mat or towel.  5. Bend your arms next to your body and lift your upper body up on your forearms. Your palms and forearms should be flat on the floor. Keep your stomach and hips on the floor.  6. Hold your upper body up with your forearms for 20 seconds. Slowly lower back down to the floor.  7. Repeat 2 times, or as instructed.  Date Last Reviewed: 3/10/2016  © 7247-8562 [employer]. [address] [place] [address]. All rights reserved. This information is not intended as a substitute for professional medical care. Always follow your healthcare professional's instructions.        Lumbar Flexion (Flexibility)    8. Lie on your back on the floor, with your knees bent and your feet flat on the floor.  9. Gently pull your knees up toward your chest. Put your hands under your thighs to help pull your knees up.  10. Press your lower back down to the floor. Hold for 20 seconds.  11. Lower your legs back down to the floor and relax.  12. Repeat 2 times, or as instructed.  Date Last Reviewed: 3/10/2016  © 7053-6425 [employer]. [address] [place] [address]. All rights reserved. This information is not intended as a substitute for professional medical care. Always follow your healthcare professional's instructions.        Lumbar Rotation    13. Lie on your back on the floor, with your knees bent and your feet flat on the floor. Dont press your neck or lower back to the floor.  14. Lean both of your knees to one side. Turn your head in the opposite direction. Keep your shoulders flat on the floor. Be gentle and dont push through pain.  15. Hold for 20 seconds. Then slowly move your knees and head in the other direction.  16. Repeat 2 to 5 times, or as instructed.   Date Last Reviewed: 3/10/2016  © 7318-5854 [employer]. [address] [place], [place] [id number]. All rights reserved. This information is not intended as a substitute for professional medical care. Always follow your healthcare professional's instructions.        Lumbar Stretch (Flexibility)    17. Lie on your back on the floor, with your knees bent and your feet flat on the floor. Dont press your neck or lower back to the floor.  18. Pull one knee up toward your chest. Clasp your hands under your thigh to help pull.  19. Hold for 30 to 60 seconds. Lower your leg back down to the floor.  20. Repeat 2 times, or as instructed.  21. Switch legs and repeat.  Date Last Reviewed: 3/10/2016  © 8103-6914 [employer]. [address] [place] [address]. All rights reserved. This information is not intended as a substitute for professional medical care. Always follow your healthcare professional's instructions.

## 2020-08-19 NOTE — PROGRESS NOTES
Subjective:       Patient ID: Osman Rm is a 30 y.o. female.    Chief Complaint: Pain of the Neck (MRI results. Neck pain is the same) and Pain of the Lumbar Spine (Lumbar is the same)      History of Present Illness   Has a history of C4-5 ACDF and previous L5-S1 anterior lumbar interbody fusion.     Patient states that she was doing great.  Unfortunately 5 days ago she slipped and fell on some concrete steps landing on her her left side including her back arm and head/neck area.  She sat there for quite some time and then was able to get up and drive herself to the emergency room right away.  They did some x-rays and evaluated her and advised her to follow up with us.   Here for cervical and lumbar MRI results pain to the same as before last visit.    Current Medications  No current outpatient medications on file.     No current facility-administered medications for this visit.        Allergies  Review of patient's allergies indicates:   Allergen Reactions    Adhesive tape-silicones Hives       Past Medical History  Past Medical History:   Diagnosis Date    Asthma        Surgical History  Past Surgical History:   Procedure Laterality Date    ANTERIOR CERVICAL DISCECTOMY W/ FUSION N/A 6/28/2018    Procedure: DISCECTOMY, SPINE, CERVICAL, ANTERIOR APPROACH, ARTHROPLASTY C4-5;  Surgeon: Andrew Brennan MD;  Location: [facility] OR;  Service: Orthopedics;  Laterality: N/A;    ANTERIOR CERVICAL DISCECTOMY W/ FUSION N/A 8/1/2019    Procedure: DISCECTOMY, SPINE, CERVICAL, ANTERIOR APPROACH, WITH FUSION C4-5;  Surgeon: Andrew Brennan MD;  Location: [facility] OR;  Service: Orthopedics;  Laterality: N/A;    BONE GRAFT N/A 8/1/2019    Procedure: BONE GRAFT;  Surgeon: Andrew Brennan MD;  Location: [facility] OR;  Service: Orthopedics;  Laterality: N/A;    FUSION OF SPINE WITH INSTRUMENTATION N/A 8/1/2019    Procedure: FUSION, SPINE, WITH INSTRUMENTATION C4-5;  Surgeon: Andrew Brennan MD;  Location: [facility] OR;  Service:  Orthopedics;  Laterality: N/A;    medial branch block      SPINAL FUSION      TONSILLECTOMY         Family History:   History reviewed. No pertinent family history.    Social History:   Social History     Socioeconomic History    Marital status:      Spouse name: Not on file    Number of children: Not on file    Years of education: Not on file    Highest education level: Not on file   Occupational History    Not on file   Social Needs    Financial resource strain: Not on file    Food insecurity     Worry: Not on file     Inability: Not on file    Transportation needs     Medical: Not on file     Non-medical: Not on file   Tobacco Use    Smoking status: Never Smoker    Smokeless tobacco: Never Used   Substance and Sexual Activity    Alcohol use: No    Drug use: No    Sexual activity: Yes     Partners: Male     Birth control/protection: Abstinence   Lifestyle    Physical activity     Days per week: Not on file     Minutes per session: Not on file    Stress: Not on file   Relationships    Social connections     Talks on phone: Not on file     Gets together: Not on file     Attends [employer] service: Not on file     Active member of club or organization: Not on file     Attends meetings of clubs or organizations: Not on file     Relationship status: Not on file   Other Topics Concern    Not on file   Social History Narrative    Not on file       Hospitalization/Major Diagnostic Procedure:     Review of Systems     General/Constitutional:  Chills denies. Fatigue denies. Fever denies. Weight gain denies. Weight loss denies.    Respiratory:  Shortness of breath denies.    Cardiovascular:  Chest pain denies.    Gastrointestinal:  Constipation denies. Diarrhea denies. Nausea denies. Vomiting denies.     Hematology:  Easy bruising denies. Prolonged bleeding denies.     Genitourinary:  Frequent urination denies. Pain in lower back denies. Painful urination denies.     Musculoskeletal:  See HPI for  details    Skin:  Rash denies.    Neurologic:  Dizziness denies. Gait abnormalities denies. Seizures denies. Tingling/Numbess denies.    Psychiatric:  Anxiety denies. Depressed mood denies.     Objective:   Vital Signs:   Vitals:    08/19/20 1256   BP: 100/60        Physical Exam      General Examination:     Constitutional: The patient is alert and oriented to lace person and time. Mood is pleasant.     Head/Face: Normal facial features normal eyebrows    Eyes: Normal extraocular motion bilaterally    Lungs: Respirations are equal and unlabored    Gait is coordinated.    Cardiovascular: There are no swelling or varicosities present.    Lymphatic: Negative for adenopathy    Skin: Normal    Neurological: Level of consciousness normal. Oriented to place person and time and situation    Psychiatric: Oriented to time place person and situation    Cervical exam shows moderate tenderness in left trapezius muscle and right rhomboid muscle range of motion mildly limited moderate tenderness and mild spasm left side lumbar spine L3-S1 range of motion limited straight-leg-raising negative  XRAY Report/ Interpretation:  Lumbar MRI results without contrast performed August 7, 2020 was reviewed postoperative fixation is noted at L5-S1 with no hardware loosening no significant foraminal stenosis are incidental findings at adjacent levels.  Cervical MRI study also performed on all the 7 was personally reviewed.  Evidence of previous cervical fusion C4-5.  Minor disc disease at C5-6 and C6-7 without significant spinal canal or neural foraminal stenosis.      Assessment:       1. History of fusion of cervical spine    2. History of lumbar fusion    3. History of fall    4. Acute strain of neck muscle, initial encounter    5. Lumbar strain, subsequent encounter    6. Bulge of cervical disc without myelopathy        Plan:       Osman was seen today for pain and pain.    Diagnoses and all orders for this visit:    History of fusion  of cervical spine    History of lumbar fusion    History of fall    Acute strain of neck muscle, initial encounter    Lumbar strain, subsequent encounter    Bulge of cervical disc without myelopathy         No follow-ups on file.    Home physical therapy exercises have been give her neck and lower back areas because she is still breast-feeding and we will give her a topical ointment to remove applied to the affected areas    This note was created using Dragon voice recognition software that occasionally misinterpreted phrases or words.

## 2020-09-17 ENCOUNTER — PATIENT MESSAGE (OUTPATIENT)
Dept: ORTHOPEDICS | Facility: CLINIC | Age: 30
End: 2020-09-17

## 2020-10-12 ENCOUNTER — TELEPHONE (OUTPATIENT)
Dept: ORTHOPEDICS | Facility: CLINIC | Age: 30
End: 2020-10-12

## 2020-10-12 NOTE — TELEPHONE ENCOUNTER
----- Message from Radha Mendoza sent at 10/12/2020  3:17 PM CDT -----  [phone]-She needs clinical notes and Mri sent to [facility]-F#-[phone] to Ayanna Benz

## 2020-10-12 NOTE — TELEPHONE ENCOUNTER
Spoke with pt,Her insurance company will need to request her medical records in [facility].

## 2020-10-14 ENCOUNTER — OFFICE VISIT (OUTPATIENT)
Dept: ORTHOPEDICS | Facility: CLINIC | Age: 30
End: 2020-10-14
Payer: COMMERCIAL

## 2020-10-14 VITALS
DIASTOLIC BLOOD PRESSURE: 70 MMHG | BODY MASS INDEX: 28.17 KG/M2 | HEIGHT: 64 IN | WEIGHT: 165 LBS | OXYGEN SATURATION: 98 % | HEART RATE: 74 BPM | SYSTOLIC BLOOD PRESSURE: 112 MMHG

## 2020-10-14 DIAGNOSIS — S16.1XXD NECK STRAIN, SUBSEQUENT ENCOUNTER: ICD-10-CM

## 2020-10-14 DIAGNOSIS — M54.16 LUMBAR RADICULITIS: ICD-10-CM

## 2020-10-14 DIAGNOSIS — Z98.1 HISTORY OF FUSION OF CERVICAL SPINE: ICD-10-CM

## 2020-10-14 DIAGNOSIS — Z98.1 HISTORY OF LUMBAR FUSION: ICD-10-CM

## 2020-10-14 DIAGNOSIS — S39.012D LUMBAR STRAIN, SUBSEQUENT ENCOUNTER: ICD-10-CM

## 2020-10-14 DIAGNOSIS — M50.30 BULGE OF CERVICAL DISC WITHOUT MYELOPATHY: ICD-10-CM

## 2020-10-14 DIAGNOSIS — Z91.81 HISTORY OF FALL: ICD-10-CM

## 2020-10-14 DIAGNOSIS — M47.812 CERVICAL FACET SYNDROME: Primary | ICD-10-CM

## 2020-10-14 PROCEDURE — 99213 OFFICE O/P EST LOW 20 MIN: CPT | Mod: S$GLB,,, | Performed by: ORTHOPAEDIC SURGERY

## 2020-10-14 PROCEDURE — 3008F BODY MASS INDEX DOCD: CPT | Mod: S$GLB,,, | Performed by: ORTHOPAEDIC SURGERY

## 2020-10-14 PROCEDURE — 99213 PR OFFICE/OUTPT VISIT, EST, LEVL III, 20-29 MIN: ICD-10-PCS | Mod: S$GLB,,, | Performed by: ORTHOPAEDIC SURGERY

## 2020-10-14 PROCEDURE — 3008F PR BODY MASS INDEX (BMI) DOCUMENTED: ICD-10-PCS | Mod: S$GLB,,, | Performed by: ORTHOPAEDIC SURGERY

## 2020-10-14 NOTE — PROGRESS NOTES
Subjective:       Patient ID: Osman Rm is a 30 y.o. female.    Chief Complaint: Pain of the Neck (Neck and shoulder pain still about an 8-9 on pain scale. Started PT, does not feel like its helping. ) and Pain of the Spine (Started PT, does not fell as though it is helping, pain still about a 5 on pain scale, not as bad as her neck. )      History of Present Illness   History of Present Illness   Has a history of C4-5 ACDF and previous L5-S1 anterior lumbar interbody fusion.     Patient states that she was doing great.  Unfortunately  she slipped and fell on some concrete steps landing on her her left side including her back arm and head/neck area.  She sat there for quite some time and then was able to get up and drive herself to the emergency room right away.  They did some x-rays and evaluated her and advised her to follow up with us.   Subsequently underwent cervical and lumbar MRIs due to trauma and after last visit and discussing the MRIs we advised conservative treatment but her pain has continued in both areas    Current Medications  No current outpatient medications on file.     No current facility-administered medications for this visit.        Allergies  Review of patient's allergies indicates:   Allergen Reactions    Adhesive tape-silicones Hives       Past Medical History  Past Medical History:   Diagnosis Date    Asthma        Surgical History  Past Surgical History:   Procedure Laterality Date    ANTERIOR CERVICAL DISCECTOMY W/ FUSION N/A 6/28/2018    Procedure: DISCECTOMY, SPINE, CERVICAL, ANTERIOR APPROACH, ARTHROPLASTY C4-5;  Surgeon: Andrew Brennan MD;  Location: [facility] OR;  Service: Orthopedics;  Laterality: N/A;    ANTERIOR CERVICAL DISCECTOMY W/ FUSION N/A 8/1/2019    Procedure: DISCECTOMY, SPINE, CERVICAL, ANTERIOR APPROACH, WITH FUSION C4-5;  Surgeon: Andrew Brennan MD;  Location: [facility] OR;  Service: Orthopedics;  Laterality: N/A;    BONE GRAFT N/A 8/1/2019    Procedure: BONE  GRAFT;  Surgeon: Andrew Brennan MD;  Location: [facility] OR;  Service: Orthopedics;  Laterality: N/A;    FUSION OF SPINE WITH INSTRUMENTATION N/A 8/1/2019    Procedure: FUSION, SPINE, WITH INSTRUMENTATION C4-5;  Surgeon: Andrew Brennan MD;  Location: [facility] OR;  Service: Orthopedics;  Laterality: N/A;    medial branch block      SPINAL FUSION      TONSILLECTOMY         Family History:   History reviewed. No pertinent family history.    Social History:   Social History     Socioeconomic History    Marital status:      Spouse name: Not on file    Number of children: Not on file    Years of education: Not on file    Highest education level: Not on file   Occupational History    Not on file   Social Needs    Financial resource strain: Not on file    Food insecurity     Worry: Not on file     Inability: Not on file    Transportation needs     Medical: Not on file     Non-medical: Not on file   Tobacco Use    Smoking status: Never Smoker    Smokeless tobacco: Never Used   Substance and Sexual Activity    Alcohol use: No    Drug use: No    Sexual activity: Yes     Partners: Male     Birth control/protection: Abstinence   Lifestyle    Physical activity     Days per week: Not on file     Minutes per session: Not on file    Stress: Not on file   Relationships    Social connections     Talks on phone: Not on file     Gets together: Not on file     Attends [employer] service: Not on file     Active member of club or organization: Not on file     Attends meetings of clubs or organizations: Not on file     Relationship status: Not on file   Other Topics Concern    Not on file   Social History Narrative    Not on file       Hospitalization/Major Diagnostic Procedure:     Review of Systems     General/Constitutional:  Chills denies. Fatigue denies. Fever denies. Weight gain denies. Weight loss denies.    Respiratory:  Shortness of breath denies.    Cardiovascular:  Chest pain denies.    Gastrointestinal:   Constipation denies. Diarrhea denies. Nausea denies. Vomiting denies.     Hematology:  Easy bruising denies. Prolonged bleeding denies.     Genitourinary:  Frequent urination denies. Pain in lower back denies. Painful urination denies.     Musculoskeletal:  See HPI for details    Skin:  Rash denies.    Neurologic:  Dizziness denies. Gait abnormalities denies. Seizures denies. Tingling/Numbess denies.    Psychiatric:  Anxiety denies. Depressed mood denies.     Objective:   Vital Signs:   Vitals:    10/14/20 0951   BP: 112/70   Pulse: 74        Physical Exam      General Examination:     Constitutional: The patient is alert and oriented to lace person and time. Mood is pleasant.     Head/Face: Normal facial features normal eyebrows    Eyes: Normal extraocular motion bilaterally    Lungs: Respirations are equal and unlabored    Gait is coordinated.    Cardiovascular: There are no swelling or varicosities present.    Lymphatic: Negative for adenopathy    Skin: Normal    Neurological: Level of consciousness normal. Oriented to place person and time and situation    Psychiatric: Oriented to time place person and situation    Cervical exam shows moderate tenderness in the left trapezius and left levator scapulae muscles and so posterior cervical paraspinous muscles range of motion slightly limited Spurling's maneuver negative       XRAY port/ Interpretation:Report/ Interpretation:  Lumbar MRI results without contrast performed August 7, 2020 was reviewed postoperative fixation is noted at L5-S1 with no hardware loosening no significant foraminal stenosis are incidental findings at adjacent levels.  Cervical MRI study also performed on all the 7 was personally reviewed.  Evidence of previous cervical fusion C4-5.  Minor disc disease at C5-6 and C6-7 without significant spinal canal or neural foraminal stenosis.    Assessment:       1. History of fusion of cervical spine    2. History of lumbar fusion    3. History of fall     4. Lumbar strain, subsequent encounter    5. Bulge of cervical disc without myelopathy    6. Lumbar radiculitis    7. Cervical radiculitis    8. Neck strain, subsequent encounter        Plan:       Osmna was seen today for pain and pain.    Diagnoses and all orders for this visit:    History of fusion of cervical spine  -     Ambulatory referral/consult to Pain Clinic; Future    History of lumbar fusion    History of fall    Lumbar strain, subsequent encounter    Bulge of cervical disc without myelopathy  -     Ambulatory referral/consult to Pain Clinic; Future    Lumbar radiculitis    Cervical radiculitis  -     Ambulatory referral/consult to Pain Clinic; Future    Neck strain, subsequent encounter         Follow up in about 2 months (around 12/14/2020) for Cervical MBB f/u.    Treatment options were discussed and that she is breast-feeding will reluctant to prescribe any oral anti-inflammatory medicines therefore we will refer her to a pain management doctor Dr. Bob lyle who has treated her before evaluate her for possible left and a right-sided cervical medial branch blocks progressing to a rhizotomy if indicated observe low back symptoms at this time    This note was created using Dragon voice recognition software that occasionally misinterpreted phrases or words.

## 2021-01-03 ENCOUNTER — PATIENT MESSAGE (OUTPATIENT)
Dept: ORTHOPEDICS | Facility: CLINIC | Age: 31
End: 2021-01-03

## 2021-01-04 ENCOUNTER — PATIENT MESSAGE (OUTPATIENT)
Dept: ORTHOPEDICS | Facility: CLINIC | Age: 31
End: 2021-01-04

## 2021-01-04 ENCOUNTER — OFFICE VISIT (OUTPATIENT)
Dept: ORTHOPEDICS | Facility: CLINIC | Age: 31
End: 2021-01-04
Payer: COMMERCIAL

## 2021-01-04 VITALS
WEIGHT: 186 LBS | SYSTOLIC BLOOD PRESSURE: 124 MMHG | DIASTOLIC BLOOD PRESSURE: 74 MMHG | HEIGHT: 64 IN | BODY MASS INDEX: 31.76 KG/M2 | HEART RATE: 78 BPM

## 2021-01-04 DIAGNOSIS — M50.30 BULGE OF CERVICAL DISC WITHOUT MYELOPATHY: ICD-10-CM

## 2021-01-04 DIAGNOSIS — Z91.81 HISTORY OF FALL: ICD-10-CM

## 2021-01-04 DIAGNOSIS — M47.812 CERVICAL FACET SYNDROME: Primary | ICD-10-CM

## 2021-01-04 DIAGNOSIS — Z98.1 HISTORY OF FUSION OF CERVICAL SPINE: ICD-10-CM

## 2021-01-04 PROCEDURE — 1125F AMNT PAIN NOTED PAIN PRSNT: CPT | Mod: S$GLB,,, | Performed by: ORTHOPAEDIC SURGERY

## 2021-01-04 PROCEDURE — 99213 PR OFFICE/OUTPT VISIT, EST, LEVL III, 20-29 MIN: ICD-10-PCS | Mod: S$GLB,,, | Performed by: ORTHOPAEDIC SURGERY

## 2021-01-04 PROCEDURE — 1125F PR PAIN SEVERITY QUANTIFIED, PAIN PRESENT: ICD-10-PCS | Mod: S$GLB,,, | Performed by: ORTHOPAEDIC SURGERY

## 2021-01-04 PROCEDURE — 99213 OFFICE O/P EST LOW 20 MIN: CPT | Mod: S$GLB,,, | Performed by: ORTHOPAEDIC SURGERY

## 2021-01-04 PROCEDURE — 3008F PR BODY MASS INDEX (BMI) DOCUMENTED: ICD-10-PCS | Mod: S$GLB,,, | Performed by: ORTHOPAEDIC SURGERY

## 2021-01-04 PROCEDURE — 3008F BODY MASS INDEX DOCD: CPT | Mod: S$GLB,,, | Performed by: ORTHOPAEDIC SURGERY

## 2021-01-18 ENCOUNTER — PATIENT MESSAGE (OUTPATIENT)
Dept: ORTHOPEDICS | Facility: CLINIC | Age: 31
End: 2021-01-18

## 2021-01-28 ENCOUNTER — OFFICE VISIT (OUTPATIENT)
Dept: FAMILY MEDICINE | Facility: CLINIC | Age: 31
End: 2021-01-28
Payer: COMMERCIAL

## 2021-01-28 VITALS
HEIGHT: 64 IN | OXYGEN SATURATION: 99 % | SYSTOLIC BLOOD PRESSURE: 110 MMHG | DIASTOLIC BLOOD PRESSURE: 70 MMHG | HEART RATE: 86 BPM | BODY MASS INDEX: 32.67 KG/M2 | TEMPERATURE: 98 F | WEIGHT: 191.38 LBS

## 2021-01-28 DIAGNOSIS — J01.90 ACUTE SINUSITIS, RECURRENCE NOT SPECIFIED, UNSPECIFIED LOCATION: Primary | ICD-10-CM

## 2021-01-28 DIAGNOSIS — Z20.822 ENCOUNTER FOR LABORATORY TESTING FOR COVID-19 VIRUS: ICD-10-CM

## 2021-01-28 PROCEDURE — U0003 INFECTIOUS AGENT DETECTION BY NUCLEIC ACID (DNA OR RNA); SEVERE ACUTE RESPIRATORY SYNDROME CORONAVIRUS 2 (SARS-COV-2) (CORONAVIRUS DISEASE [COVID-19]), AMPLIFIED PROBE TECHNIQUE, MAKING USE OF HIGH THROUGHPUT TECHNOLOGIES AS DESCRIBED BY CMS-2020-01-R: HCPCS

## 2021-01-28 PROCEDURE — 1126F AMNT PAIN NOTED NONE PRSNT: CPT | Mod: S$GLB,,, | Performed by: STUDENT IN AN ORGANIZED HEALTH CARE EDUCATION/TRAINING PROGRAM

## 2021-01-28 PROCEDURE — 99999 PR PBB SHADOW E&M-EST. PATIENT-LVL IV: ICD-10-PCS | Mod: PBBFAC,,, | Performed by: STUDENT IN AN ORGANIZED HEALTH CARE EDUCATION/TRAINING PROGRAM

## 2021-01-28 PROCEDURE — 3008F PR BODY MASS INDEX (BMI) DOCUMENTED: ICD-10-PCS | Mod: CPTII,S$GLB,, | Performed by: STUDENT IN AN ORGANIZED HEALTH CARE EDUCATION/TRAINING PROGRAM

## 2021-01-28 PROCEDURE — 99204 OFFICE O/P NEW MOD 45 MIN: CPT | Mod: S$GLB,,, | Performed by: STUDENT IN AN ORGANIZED HEALTH CARE EDUCATION/TRAINING PROGRAM

## 2021-01-28 PROCEDURE — 3008F BODY MASS INDEX DOCD: CPT | Mod: CPTII,S$GLB,, | Performed by: STUDENT IN AN ORGANIZED HEALTH CARE EDUCATION/TRAINING PROGRAM

## 2021-01-28 PROCEDURE — 99204 PR OFFICE/OUTPT VISIT, NEW, LEVL IV, 45-59 MIN: ICD-10-PCS | Mod: S$GLB,,, | Performed by: STUDENT IN AN ORGANIZED HEALTH CARE EDUCATION/TRAINING PROGRAM

## 2021-01-28 PROCEDURE — 1126F PR PAIN SEVERITY QUANTIFIED, NO PAIN PRESENT: ICD-10-PCS | Mod: S$GLB,,, | Performed by: STUDENT IN AN ORGANIZED HEALTH CARE EDUCATION/TRAINING PROGRAM

## 2021-01-28 PROCEDURE — 99999 PR PBB SHADOW E&M-EST. PATIENT-LVL IV: CPT | Mod: PBBFAC,,, | Performed by: STUDENT IN AN ORGANIZED HEALTH CARE EDUCATION/TRAINING PROGRAM

## 2021-01-28 RX ORDER — AMOXICILLIN AND CLAVULANATE POTASSIUM 875; 125 MG/1; MG/1
1 TABLET, FILM COATED ORAL 2 TIMES DAILY
Qty: 20 TABLET | Refills: 0 | Status: SHIPPED | OUTPATIENT
Start: 2021-02-01 | End: 2021-02-11

## 2021-01-28 RX ORDER — GUAIFENESIN 100 MG/5ML
200 SOLUTION ORAL 3 TIMES DAILY PRN
COMMUNITY
End: 2021-06-21

## 2021-01-28 RX ORDER — AMOXICILLIN AND CLAVULANATE POTASSIUM 875; 125 MG/1; MG/1
1 TABLET, FILM COATED ORAL 2 TIMES DAILY
Qty: 20 TABLET | Refills: 0 | Status: SHIPPED | OUTPATIENT
Start: 2021-01-28 | End: 2021-01-28

## 2021-01-29 LAB — SARS-COV-2 RNA RESP QL NAA+PROBE: NOT DETECTED

## 2021-04-07 ENCOUNTER — OFFICE VISIT (OUTPATIENT)
Dept: ORTHOPEDICS | Facility: CLINIC | Age: 31
End: 2021-04-07
Payer: COMMERCIAL

## 2021-04-07 VITALS
WEIGHT: 191 LBS | SYSTOLIC BLOOD PRESSURE: 122 MMHG | HEART RATE: 68 BPM | BODY MASS INDEX: 32.61 KG/M2 | HEIGHT: 64 IN | DIASTOLIC BLOOD PRESSURE: 82 MMHG

## 2021-04-07 DIAGNOSIS — M50.30 BULGE OF CERVICAL DISC WITHOUT MYELOPATHY: ICD-10-CM

## 2021-04-07 DIAGNOSIS — M47.812 CERVICAL FACET SYNDROME: Primary | ICD-10-CM

## 2021-04-07 DIAGNOSIS — Z98.1 HISTORY OF FUSION OF CERVICAL SPINE: ICD-10-CM

## 2021-04-07 PROCEDURE — 3008F BODY MASS INDEX DOCD: CPT | Mod: S$GLB,,, | Performed by: ORTHOPAEDIC SURGERY

## 2021-04-07 PROCEDURE — 99213 OFFICE O/P EST LOW 20 MIN: CPT | Mod: S$GLB,,, | Performed by: ORTHOPAEDIC SURGERY

## 2021-04-07 PROCEDURE — 3008F PR BODY MASS INDEX (BMI) DOCUMENTED: ICD-10-PCS | Mod: S$GLB,,, | Performed by: ORTHOPAEDIC SURGERY

## 2021-04-07 PROCEDURE — 1125F PR PAIN SEVERITY QUANTIFIED, PAIN PRESENT: ICD-10-PCS | Mod: S$GLB,,, | Performed by: ORTHOPAEDIC SURGERY

## 2021-04-07 PROCEDURE — 99213 PR OFFICE/OUTPT VISIT, EST, LEVL III, 20-29 MIN: ICD-10-PCS | Mod: S$GLB,,, | Performed by: ORTHOPAEDIC SURGERY

## 2021-04-07 PROCEDURE — 1125F AMNT PAIN NOTED PAIN PRSNT: CPT | Mod: S$GLB,,, | Performed by: ORTHOPAEDIC SURGERY

## 2021-06-21 ENCOUNTER — OFFICE VISIT (OUTPATIENT)
Dept: ORTHOPEDICS | Facility: CLINIC | Age: 31
End: 2021-06-21
Payer: COMMERCIAL

## 2021-06-21 VITALS — WEIGHT: 191 LBS | HEIGHT: 64 IN | BODY MASS INDEX: 32.61 KG/M2

## 2021-06-21 DIAGNOSIS — M47.812 CERVICAL FACET SYNDROME: Primary | ICD-10-CM

## 2021-06-21 DIAGNOSIS — Z98.1 HISTORY OF FUSION OF CERVICAL SPINE: ICD-10-CM

## 2021-06-21 PROCEDURE — 99213 PR OFFICE/OUTPT VISIT, EST, LEVL III, 20-29 MIN: ICD-10-PCS | Mod: S$GLB,,, | Performed by: ORTHOPAEDIC SURGERY

## 2021-06-21 PROCEDURE — 3008F BODY MASS INDEX DOCD: CPT | Mod: S$GLB,,, | Performed by: ORTHOPAEDIC SURGERY

## 2021-06-21 PROCEDURE — 3008F PR BODY MASS INDEX (BMI) DOCUMENTED: ICD-10-PCS | Mod: S$GLB,,, | Performed by: ORTHOPAEDIC SURGERY

## 2021-06-21 PROCEDURE — 99213 OFFICE O/P EST LOW 20 MIN: CPT | Mod: S$GLB,,, | Performed by: ORTHOPAEDIC SURGERY

## 2021-06-21 PROCEDURE — 1125F PR PAIN SEVERITY QUANTIFIED, PAIN PRESENT: ICD-10-PCS | Mod: S$GLB,,, | Performed by: ORTHOPAEDIC SURGERY

## 2021-06-21 PROCEDURE — 1125F AMNT PAIN NOTED PAIN PRSNT: CPT | Mod: S$GLB,,, | Performed by: ORTHOPAEDIC SURGERY

## 2021-07-07 ENCOUNTER — TELEPHONE (OUTPATIENT)
Dept: ORTHOPEDICS | Facility: CLINIC | Age: 31
End: 2021-07-07

## 2021-08-27 NOTE — PROGRESS NOTES
"Mom and patient call with concerns about cramping.  Patient has not had a period since July 1st, denies pregnancy.  Patient and mom vague with information, but mom requesting ultrasound.  Patient reports mild mucus type discharge occasionally.  She denies bowel or bladder issues.  She is using Ibuprofen PRN.     Reason for Disposition    Triager thinks child needs to be seen for non-urgent acute problem    Additional Information    Negative: Sounds like a life-threatening emergency to the triager    Negative: Abdominal cramps unrelated to menstrual period    Negative: Is pregnant or could be pregnant    Negative: Teenager sounds very sick or weak to the triager    Negative: Unable to walk normally    Negative: Fever    Negative: More severe cramps than ever before and not improved with ibuprofen or naproxen    Negative: Unusual vaginal discharge before period began    Negative: Pain only on 1 side    Answer Assessment - Initial Assessment Questions  1. LOCATION: \"Where is the pain located?\"       Across the lower abdomen   2. SEVERITY: \"How bad is the pain?\" \"What does it keep your daughter from doing?\"   * Mild:  interferes minimally or not at all with activities   * Moderate: interferes with normal activities or awakens from sleep   * Severe: excruciating pain and teen incapacitated        Unable to rate, but mild to moderate  3. ONSET: \"How long has the pain been present?\" \"On which day of the menstrual period did the cramps begin?\"       The last 3-5 days.   4. RECURRENT PAIN: \"Has your daughter had menstrual cramps before?\" If so, ask: \"What happened last time?\" and \"Which medicine worked best?\"      Denies  5. MENSTRUAL HISTORY:  \"When did this menstrual period begin?\", \"Is this a normal period for your daughter?\"        Denies issues with past periods.  6. LMP:  \"When did the last menstrual period begin?\"      July 1st.   7. PREGNANCY (if patient is calling): \"Is there any chance you are pregnant?\" (e.g., " Subjective:       Patient ID: Osman Rm is a 29 y.o. female.    Chief Complaint: Hoarse (lost voice)    HPI     Osman Rm is a 29 y.o. female who has been referred for a 2 months history of hoarseness. She noticed that her voice was weak immediately following spine surgery.  Her voice is not progressively worsening over this time. There are not pitch breaks or cracks. There is vocal fatigue. She  denies odynophagia, throat pain, and otalgia.  She is seeing SLP for dysphagia. There is no hemoptysis or hematemesis.  She is breathing well, but does get SOB with prolonged speaking.     She denies throat clearing and cough. She denies heartburn and reflux.    Past Medical History:   Diagnosis Date    Asthma        Past Surgical History:   Procedure Laterality Date    ANTERIOR CERVICAL DISCECTOMY W/ FUSION N/A 6/28/2018    Procedure: DISCECTOMY, SPINE, CERVICAL, ANTERIOR APPROACH, ARTHROPLASTY C4-5;  Surgeon: Andrew Brennan MD;  Location: [facility] OR;  Service: Orthopedics;  Laterality: N/A;    ANTERIOR CERVICAL DISCECTOMY W/ FUSION N/A 8/1/2019    Procedure: DISCECTOMY, SPINE, CERVICAL, ANTERIOR APPROACH, WITH FUSION C4-5;  Surgeon: Andrew Brennan MD;  Location: [facility] OR;  Service: Orthopedics;  Laterality: N/A;    BONE GRAFT N/A 8/1/2019    Procedure: BONE GRAFT;  Surgeon: Andrew Brennan MD;  Location: [facility] OR;  Service: Orthopedics;  Laterality: N/A;    FUSION OF SPINE WITH INSTRUMENTATION N/A 8/1/2019    Procedure: FUSION, SPINE, WITH INSTRUMENTATION C4-5;  Surgeon: Andrew Brennan MD;  Location: [facility] OR;  Service: Orthopedics;  Laterality: N/A;    medial branch block      SPINAL FUSION      TONSILLECTOMY           Current Outpatient Medications:     ondansetron (ZOFRAN) 8 MG tablet, Take 1 tablet (8 mg total) by mouth every 6 to 8 hours as needed for Nausea. (Patient not taking: Reported on 10/22/2019), Disp: 30 tablet, Rfl: 0    oxyCODONE-acetaminophen (PERCOCET)  mg per tablet, Take 1  tablet by mouth every 4 to 6 hours as needed for Pain. Script written by Dr. Brennan. (Patient not taking: Reported on 10/22/2019), Disp: 40 tablet, Rfl: 0    traMADol (ULTRAM) 50 mg tablet, Take 1 tablet (50 mg total) by mouth every 6 (six) hours as needed for Pain. (Patient not taking: Reported on 10/22/2019), Disp: 28 tablet, Rfl: 3    Review of patient's allergies indicates:   Allergen Reactions    Adhesive tape-silicones Hives       Social History     Socioeconomic History    Marital status:      Spouse name: Not on file    Number of children: Not on file    Years of education: Not on file    Highest education level: Not on file   Occupational History    Not on file   Social Needs    Financial resource strain: Not on file    Food insecurity:     Worry: Not on file     Inability: Not on file    Transportation needs:     Medical: Not on file     Non-medical: Not on file   Tobacco Use    Smoking status: Never Smoker    Smokeless tobacco: Never Used   Substance and Sexual Activity    Alcohol use: No    Drug use: No    Sexual activity: Yes     Partners: Male     Birth control/protection: Abstinence   Lifestyle    Physical activity:     Days per week: Not on file     Minutes per session: Not on file    Stress: Not on file   Relationships    Social connections:     Talks on phone: Not on file     Gets together: Not on file     Attends [employer] service: Not on file     Active member of club or organization: Not on file     Attends meetings of clubs or organizations: Not on file     Relationship status: Not on file   Other Topics Concern    Not on file   Social History Narrative    Not on file       History reviewed. No pertinent family history.      Review of Systems   Constitutional: Negative for appetite change, chills, diaphoresis, fatigue, fever and unexpected weight change.   HENT: Positive for trouble swallowing and voice change. Negative for congestion, dental problem, drooling, ear  unprotected intercourse, missed birth control pill, broken condom)      Denies    Protocols used: MENSTRUAL CRAMPS-P-OH       discharge, ear pain, facial swelling, hearing loss, mouth sores, nosebleeds, postnasal drip, rhinorrhea, sinus pressure, sneezing, sore throat and tinnitus.    Eyes: Negative for pain, discharge, redness and itching.   Respiratory: Negative for cough and shortness of breath.    Cardiovascular: Negative for chest pain.   Gastrointestinal: Negative for abdominal distention, abdominal pain, diarrhea, nausea and vomiting.   Endocrine: Negative for cold intolerance and heat intolerance.   Genitourinary: Negative for difficulty urinating.   Musculoskeletal: Negative for neck pain and neck stiffness.   Skin: Negative for rash.   Neurological: Negative for dizziness, weakness and headaches.   Hematological: Negative for adenopathy.       Objective:      Physical Exam   Constitutional: She is oriented to person, place, and time. She appears well-developed and well-nourished. She is cooperative. She does not appear ill. No distress.   HENT:   Head: Normocephalic and atraumatic.   Right Ear: Hearing and external ear normal.   Left Ear: Hearing and external ear normal.   Nose: Nose normal. No mucosal edema, rhinorrhea or nasal deformity. No epistaxis.  No foreign bodies. Right sinus exhibits no maxillary sinus tenderness and no frontal sinus tenderness. Left sinus exhibits no maxillary sinus tenderness and no frontal sinus tenderness.   Mouth/Throat: Uvula is midline, oropharynx is clear and moist and mucous membranes are normal. Mucous membranes are not pale, not dry and not cyanotic. She does not have dentures. No oral lesions. No trismus in the jaw. Normal dentition. No uvula swelling or dental caries. No oropharyngeal exudate, posterior oropharyngeal edema, posterior oropharyngeal erythema or tonsillar abscesses.   Rigid Laryngeal Videostroboscopy (73805): Laryngeal videostroboscopy is indicated to assess the laryngeal vibratory biomechanics and vocal fold oscillation, which cannot be assessed with a plain light  examination. This was carried out with a 70 degree endoscope. After verbal consent was obtained, the patient was positioned and the tongue was gently secured with a gauze sponge. The endoscope was passed transorally and positioned to image the larynx and hypopharynx in detail. The following features were examined: laryngeal and hypopharyngeal masses; vocal fold range and symmetry of motion; laryngeal mucosal edema, erythema, inflammation, and hydration; salivary pooling; and gross laryngeal sensation. During phonation, the vocal folds were assessed for glottal closure; mucosal wave; vocal fold lesions; vibratory periodicity, amplitude, and phase symmetry; and vertical height match. The equipment was removed. The patient tolerated the procedure well without complication. All findings were normal except:    Vibration intermittently difficult to assess due to AP hyperfunction. No lesions or movement abnormalities noted.    Eyes: Conjunctivae are normal. Right eye exhibits no discharge. Left eye exhibits no discharge. No scleral icterus.   Neck: Trachea normal, normal range of motion and phonation normal. Neck supple. No JVD present. No tracheal tenderness present. No tracheal deviation present. No thyroid mass and no thyromegaly present.   Well healed right neck incision     Cardiovascular: Normal rate.   Pulmonary/Chest: Effort normal. No stridor. No respiratory distress.   Lymphadenopathy:     She has no cervical adenopathy.   Neurological: She is alert and oriented to person, place, and time. No cranial nerve deficit.   Skin: Skin is warm and dry. No rash noted. She is not diaphoretic. No erythema. No pallor.   Psychiatric: She has a normal mood and affect. Her behavior is normal. Thought content normal.   Vitals reviewed.      Assessment:       1. Dysphonia    2. Oropharyngeal dysphagia        Plan:       Problem List Items Addressed This Visit        ENT    Dysphonia - Primary     29 year old female with normal  vocal cord movement. We discussed that her dysphonia is functional in nature. Referral placed for voice therapy. Questions answered. RTC prn.            GI    Oropharyngeal dysphagia

## 2021-09-20 ENCOUNTER — OFFICE VISIT (OUTPATIENT)
Dept: ORTHOPEDICS | Facility: CLINIC | Age: 31
End: 2021-09-20
Payer: COMMERCIAL

## 2021-09-20 VITALS — HEIGHT: 64 IN | BODY MASS INDEX: 30.73 KG/M2 | WEIGHT: 180 LBS

## 2021-09-20 DIAGNOSIS — Z98.1 HISTORY OF FUSION OF CERVICAL SPINE: ICD-10-CM

## 2021-09-20 DIAGNOSIS — M47.812 CERVICAL FACET SYNDROME: Primary | ICD-10-CM

## 2021-09-20 PROCEDURE — 1160F PR REVIEW ALL MEDS BY PRESCRIBER/CLIN PHARMACIST DOCUMENTED: ICD-10-PCS | Mod: S$GLB,,, | Performed by: ORTHOPAEDIC SURGERY

## 2021-09-20 PROCEDURE — 99213 OFFICE O/P EST LOW 20 MIN: CPT | Mod: S$GLB,,, | Performed by: ORTHOPAEDIC SURGERY

## 2021-09-20 PROCEDURE — 99213 PR OFFICE/OUTPT VISIT, EST, LEVL III, 20-29 MIN: ICD-10-PCS | Mod: S$GLB,,, | Performed by: ORTHOPAEDIC SURGERY

## 2021-09-20 PROCEDURE — 1160F RVW MEDS BY RX/DR IN RCRD: CPT | Mod: S$GLB,,, | Performed by: ORTHOPAEDIC SURGERY

## 2021-09-20 PROCEDURE — 3008F BODY MASS INDEX DOCD: CPT | Mod: S$GLB,,, | Performed by: ORTHOPAEDIC SURGERY

## 2021-09-20 PROCEDURE — 3008F PR BODY MASS INDEX (BMI) DOCUMENTED: ICD-10-PCS | Mod: S$GLB,,, | Performed by: ORTHOPAEDIC SURGERY

## 2021-09-29 ENCOUNTER — OFFICE VISIT (OUTPATIENT)
Dept: ORTHOPEDICS | Facility: CLINIC | Age: 31
End: 2021-09-29
Payer: COMMERCIAL

## 2021-09-29 ENCOUNTER — HOSPITAL ENCOUNTER (OUTPATIENT)
Dept: RADIOLOGY | Facility: HOSPITAL | Age: 31
Discharge: HOME | End: 2021-09-29
Attending: ORTHOPAEDIC SURGERY
Payer: COMMERCIAL

## 2021-09-29 VITALS — WEIGHT: 180 LBS | HEIGHT: 64 IN | BODY MASS INDEX: 30.73 KG/M2

## 2021-09-29 DIAGNOSIS — Z98.1 HISTORY OF FUSION OF CERVICAL SPINE: ICD-10-CM

## 2021-09-29 DIAGNOSIS — M47.812 CERVICAL FACET SYNDROME: Primary | ICD-10-CM

## 2021-09-29 DIAGNOSIS — M47.812 CERVICAL FACET SYNDROME: ICD-10-CM

## 2021-09-29 PROCEDURE — 1160F PR REVIEW ALL MEDS BY PRESCRIBER/CLIN PHARMACIST DOCUMENTED: ICD-10-PCS | Mod: S$GLB,,, | Performed by: ORTHOPAEDIC SURGERY

## 2021-09-29 PROCEDURE — 99213 PR OFFICE/OUTPT VISIT, EST, LEVL III, 20-29 MIN: ICD-10-PCS | Mod: S$GLB,,, | Performed by: ORTHOPAEDIC SURGERY

## 2021-09-29 PROCEDURE — 3008F BODY MASS INDEX DOCD: CPT | Mod: S$GLB,,, | Performed by: ORTHOPAEDIC SURGERY

## 2021-09-29 PROCEDURE — 72141 MRI NECK SPINE W/O DYE: CPT | Mod: TC,PO

## 2021-09-29 PROCEDURE — 1160F RVW MEDS BY RX/DR IN RCRD: CPT | Mod: S$GLB,,, | Performed by: ORTHOPAEDIC SURGERY

## 2021-09-29 PROCEDURE — 99213 OFFICE O/P EST LOW 20 MIN: CPT | Mod: S$GLB,,, | Performed by: ORTHOPAEDIC SURGERY

## 2021-09-29 PROCEDURE — 3008F PR BODY MASS INDEX (BMI) DOCUMENTED: ICD-10-PCS | Mod: S$GLB,,, | Performed by: ORTHOPAEDIC SURGERY

## 2021-09-29 RX ORDER — DICLOFENAC POTASSIUM 50 MG/1
50 TABLET, FILM COATED ORAL 2 TIMES DAILY
Qty: 60 TABLET | Refills: 0 | Status: SHIPPED | OUTPATIENT
Start: 2021-09-29 | End: 2021-10-29

## 2021-10-08 ENCOUNTER — TELEPHONE (OUTPATIENT)
Dept: SLEEP MEDICINE | Facility: CLINIC | Age: 31
End: 2021-10-08

## 2021-10-08 DIAGNOSIS — Z01.818 PRE-OP TESTING: ICD-10-CM

## 2021-10-12 ENCOUNTER — TELEPHONE (OUTPATIENT)
Dept: SLEEP MEDICINE | Facility: OTHER | Age: 31
End: 2021-10-12

## 2021-10-31 ENCOUNTER — LAB VISIT (OUTPATIENT)
Dept: URGENT CARE | Facility: CLINIC | Age: 31
End: 2021-10-31
Payer: COMMERCIAL

## 2021-10-31 DIAGNOSIS — Z01.818 PRE-OP TESTING: ICD-10-CM

## 2021-10-31 PROCEDURE — U0005 INFEC AGEN DETEC AMPLI PROBE: HCPCS | Performed by: INTERNAL MEDICINE

## 2021-10-31 PROCEDURE — U0003 INFECTIOUS AGENT DETECTION BY NUCLEIC ACID (DNA OR RNA); SEVERE ACUTE RESPIRATORY SYNDROME CORONAVIRUS 2 (SARS-COV-2) (CORONAVIRUS DISEASE [COVID-19]), AMPLIFIED PROBE TECHNIQUE, MAKING USE OF HIGH THROUGHPUT TECHNOLOGIES AS DESCRIBED BY CMS-2020-01-R: HCPCS | Performed by: INTERNAL MEDICINE

## 2021-11-01 LAB
SARS-COV-2 RNA RESP QL NAA+PROBE: NOT DETECTED
SARS-COV-2- CYCLE NUMBER: NORMAL

## 2021-11-04 ENCOUNTER — TELEPHONE (OUTPATIENT)
Dept: ORTHOPEDICS | Facility: CLINIC | Age: 31
End: 2021-11-04
Payer: COMMERCIAL

## 2021-11-10 ENCOUNTER — OFFICE VISIT (OUTPATIENT)
Dept: ORTHOPEDICS | Facility: CLINIC | Age: 31
End: 2021-11-10
Payer: COMMERCIAL

## 2021-11-10 VITALS — HEIGHT: 64 IN | BODY MASS INDEX: 30.73 KG/M2 | WEIGHT: 180 LBS

## 2021-11-10 DIAGNOSIS — Z98.1 HISTORY OF FUSION OF CERVICAL SPINE: Primary | ICD-10-CM

## 2021-11-10 DIAGNOSIS — M54.2 NECK PAIN, CHRONIC: ICD-10-CM

## 2021-11-10 DIAGNOSIS — G89.29 NECK PAIN, CHRONIC: ICD-10-CM

## 2021-11-10 PROCEDURE — 1160F PR REVIEW ALL MEDS BY PRESCRIBER/CLIN PHARMACIST DOCUMENTED: ICD-10-PCS | Mod: S$GLB,,, | Performed by: ORTHOPAEDIC SURGERY

## 2021-11-10 PROCEDURE — 99214 PR OFFICE/OUTPT VISIT, EST, LEVL IV, 30-39 MIN: ICD-10-PCS | Mod: S$GLB,,, | Performed by: ORTHOPAEDIC SURGERY

## 2021-11-10 PROCEDURE — 3008F PR BODY MASS INDEX (BMI) DOCUMENTED: ICD-10-PCS | Mod: S$GLB,,, | Performed by: ORTHOPAEDIC SURGERY

## 2021-11-10 PROCEDURE — 3008F BODY MASS INDEX DOCD: CPT | Mod: S$GLB,,, | Performed by: ORTHOPAEDIC SURGERY

## 2021-11-10 PROCEDURE — 1160F RVW MEDS BY RX/DR IN RCRD: CPT | Mod: S$GLB,,, | Performed by: ORTHOPAEDIC SURGERY

## 2021-11-10 PROCEDURE — 99214 OFFICE O/P EST MOD 30 MIN: CPT | Mod: S$GLB,,, | Performed by: ORTHOPAEDIC SURGERY

## 2021-12-27 ENCOUNTER — OFFICE VISIT (OUTPATIENT)
Dept: HEMATOLOGY/ONCOLOGY | Facility: CLINIC | Age: 31
End: 2021-12-27
Payer: COMMERCIAL

## 2021-12-27 DIAGNOSIS — N92.0 MENORRHAGIA WITH REGULAR CYCLE: ICD-10-CM

## 2021-12-27 DIAGNOSIS — D50.0 IRON DEFICIENCY ANEMIA DUE TO CHRONIC BLOOD LOSS: Primary | ICD-10-CM

## 2021-12-27 PROCEDURE — 99204 OFFICE O/P NEW MOD 45 MIN: CPT | Mod: 95,,, | Performed by: INTERNAL MEDICINE

## 2021-12-27 PROCEDURE — 99204 PR OFFICE/OUTPT VISIT, NEW, LEVL IV, 45-59 MIN: ICD-10-PCS | Mod: 95,,, | Performed by: INTERNAL MEDICINE

## 2022-01-05 ENCOUNTER — TELEPHONE (OUTPATIENT)
Dept: HEMATOLOGY/ONCOLOGY | Facility: CLINIC | Age: 32
End: 2022-01-05
Payer: COMMERCIAL

## 2022-01-05 NOTE — TELEPHONE ENCOUNTER
Nurse spoke with pt about r/s labs/ pt and nurse r/s together on the phone. Pt verbalized time/date/location of labs

## 2022-01-12 ENCOUNTER — LAB VISIT (OUTPATIENT)
Dept: LAB | Facility: HOSPITAL | Age: 32
End: 2022-01-12
Attending: INTERNAL MEDICINE
Payer: COMMERCIAL

## 2022-01-12 DIAGNOSIS — D50.0 IRON DEFICIENCY ANEMIA DUE TO CHRONIC BLOOD LOSS: ICD-10-CM

## 2022-01-12 DIAGNOSIS — D50.8 OTHER IRON DEFICIENCY ANEMIA: Primary | ICD-10-CM

## 2022-01-12 LAB
BASOPHILS # BLD AUTO: 0.06 K/UL (ref 0–0.2)
BASOPHILS NFR BLD: 1 % (ref 0–1.9)
DIFFERENTIAL METHOD: ABNORMAL
EOSINOPHIL # BLD AUTO: 0.1 K/UL (ref 0–0.5)
EOSINOPHIL NFR BLD: 1.8 % (ref 0–8)
ERYTHROCYTE [DISTWIDTH] IN BLOOD BY AUTOMATED COUNT: 14.6 % (ref 11.5–14.5)
FERRITIN SERPL-MCNC: 458 NG/ML (ref 20–300)
HCT VFR BLD AUTO: 32.3 % (ref 37–48.5)
HGB BLD-MCNC: 10.4 G/DL (ref 12–16)
IMM GRANULOCYTES # BLD AUTO: 0.02 K/UL (ref 0–0.04)
IMM GRANULOCYTES NFR BLD AUTO: 0.3 % (ref 0–0.5)
IRON SERPL-MCNC: 1040 UG/DL (ref 30–160)
LYMPHOCYTES # BLD AUTO: 1.6 K/UL (ref 1–4.8)
LYMPHOCYTES NFR BLD: 25.2 % (ref 18–48)
MCH RBC QN AUTO: 29.3 PG (ref 27–31)
MCHC RBC AUTO-ENTMCNC: 32.2 G/DL (ref 32–36)
MCV RBC AUTO: 91 FL (ref 82–98)
MONOCYTES # BLD AUTO: 0.5 K/UL (ref 0.3–1)
MONOCYTES NFR BLD: 8 % (ref 4–15)
NEUTROPHILS # BLD AUTO: 4 K/UL (ref 1.8–7.7)
NEUTROPHILS NFR BLD: 63.7 % (ref 38–73)
NRBC BLD-RTO: 0 /100 WBC
PLATELET # BLD AUTO: 262 K/UL (ref 150–450)
PMV BLD AUTO: 11.3 FL (ref 9.2–12.9)
RBC # BLD AUTO: 3.55 M/UL (ref 4–5.4)
SATURATED IRON: 309 % (ref 20–50)
TOTAL IRON BINDING CAPACITY: 337 UG/DL (ref 250–450)
TRANSFERRIN SERPL-MCNC: 228 MG/DL (ref 200–375)
WBC # BLD AUTO: 6.22 K/UL (ref 3.9–12.7)

## 2022-01-12 PROCEDURE — 36415 COLL VENOUS BLD VENIPUNCTURE: CPT | Performed by: INTERNAL MEDICINE

## 2022-01-12 PROCEDURE — 82728 ASSAY OF FERRITIN: CPT | Performed by: INTERNAL MEDICINE

## 2022-01-12 PROCEDURE — 85025 COMPLETE CBC W/AUTO DIFF WBC: CPT | Performed by: INTERNAL MEDICINE

## 2022-01-12 PROCEDURE — 83540 ASSAY OF IRON: CPT | Performed by: INTERNAL MEDICINE

## 2022-01-13 ENCOUNTER — PATIENT MESSAGE (OUTPATIENT)
Dept: HEMATOLOGY/ONCOLOGY | Facility: CLINIC | Age: 32
End: 2022-01-13
Payer: COMMERCIAL

## 2022-01-24 ENCOUNTER — OFFICE VISIT (OUTPATIENT)
Dept: ORTHOPEDICS | Facility: CLINIC | Age: 32
End: 2022-01-24
Payer: COMMERCIAL

## 2022-01-24 VITALS — BODY MASS INDEX: 28.34 KG/M2 | WEIGHT: 166 LBS | HEIGHT: 64 IN

## 2022-01-24 DIAGNOSIS — Z98.1 HISTORY OF FUSION OF CERVICAL SPINE: Primary | ICD-10-CM

## 2022-01-24 DIAGNOSIS — G89.29 NECK PAIN, CHRONIC: ICD-10-CM

## 2022-01-24 DIAGNOSIS — Z91.81 HISTORY OF FALL: ICD-10-CM

## 2022-01-24 DIAGNOSIS — M47.812 CERVICAL FACET SYNDROME: ICD-10-CM

## 2022-01-24 DIAGNOSIS — M54.2 NECK PAIN, CHRONIC: ICD-10-CM

## 2022-01-24 PROCEDURE — 1160F RVW MEDS BY RX/DR IN RCRD: CPT | Mod: S$GLB,,, | Performed by: ORTHOPAEDIC SURGERY

## 2022-01-24 PROCEDURE — 3008F PR BODY MASS INDEX (BMI) DOCUMENTED: ICD-10-PCS | Mod: S$GLB,,, | Performed by: ORTHOPAEDIC SURGERY

## 2022-01-24 PROCEDURE — 3008F BODY MASS INDEX DOCD: CPT | Mod: S$GLB,,, | Performed by: ORTHOPAEDIC SURGERY

## 2022-01-24 PROCEDURE — 99213 OFFICE O/P EST LOW 20 MIN: CPT | Mod: S$GLB,,, | Performed by: ORTHOPAEDIC SURGERY

## 2022-01-24 PROCEDURE — 99213 PR OFFICE/OUTPT VISIT, EST, LEVL III, 20-29 MIN: ICD-10-PCS | Mod: S$GLB,,, | Performed by: ORTHOPAEDIC SURGERY

## 2022-01-24 PROCEDURE — 1160F PR REVIEW ALL MEDS BY PRESCRIBER/CLIN PHARMACIST DOCUMENTED: ICD-10-PCS | Mod: S$GLB,,, | Performed by: ORTHOPAEDIC SURGERY

## 2022-01-24 NOTE — PROGRESS NOTES
Subjective:       Patient ID: Osman Rm is a 31 y.o. female.    Chief Complaint: Pain of the Neck (Pt is here for a f/up on cervical injection with Dr. Castillo, Injection helped a little bit, some numbness in neck, shoulder if she lays a certain way, pain is not as great. )      History of Present Illness    Prior to meeting with the patient I reviewed the medical chart in [facility]. This included reviewing the previous progress notes from our office, review of the patient's last appointment with their primary care provider, review of any visits to the emergency room, and review of any pain management appointments or procedures.   Patient is here follow-up for constant neck pain status post previous C4-5 ACDF in August 2019 (greater than 2 years ago).   most recent C6-7 interlaminar epidural steroid injection did improve her symptoms some.  Patient states that her current symptomatology is tolerable.    Patient reiterates that she was doing really well postoperatively until a fall approximately a year ago around January 2021    Current Medications  No current outpatient medications on file.     No current facility-administered medications for this visit.       Allergies  Review of patient's allergies indicates:   Allergen Reactions    Adhesive tape-silicones Hives       Past Medical History  Past Medical History:   Diagnosis Date    Asthma        Surgical History  Past Surgical History:   Procedure Laterality Date    ANTERIOR CERVICAL DISCECTOMY W/ FUSION N/A 6/28/2018    Procedure: DISCECTOMY, SPINE, CERVICAL, ANTERIOR APPROACH, ARTHROPLASTY C4-5;  Surgeon: Andrew Brennan MD;  Location: [facility] OR;  Service: Orthopedics;  Laterality: N/A;    ANTERIOR CERVICAL DISCECTOMY W/ FUSION N/A 8/1/2019    Procedure: DISCECTOMY, SPINE, CERVICAL, ANTERIOR APPROACH, WITH FUSION C4-5;  Surgeon: Andrew Brennan MD;  Location: [facility] OR;  Service: Orthopedics;  Laterality: N/A;    BONE GRAFT N/A 8/1/2019    Procedure: BONE  GRAFT;  Surgeon: Andrew Brennan MD;  Location: [facility] OR;  Service: Orthopedics;  Laterality: N/A;    FUSION OF SPINE WITH INSTRUMENTATION N/A 8/1/2019    Procedure: FUSION, SPINE, WITH INSTRUMENTATION C4-5;  Surgeon: Andrew Brennan MD;  Location: [facility] OR;  Service: Orthopedics;  Laterality: N/A;    medial branch block      SPINAL FUSION      TONSILLECTOMY         Family History:   History reviewed. No pertinent family history.    Social History:   Social History     Socioeconomic History    Marital status:    Tobacco Use    Smoking status: Never Smoker    Smokeless tobacco: Never Used   Substance and Sexual Activity    Alcohol use: No    Drug use: No    Sexual activity: Yes     Partners: Male     Birth control/protection: Abstinence       Hospitalization/Major Diagnostic Procedure:     Review of Systems     General/Constitutional:  Chills denies. Fatigue denies. Fever denies. Weight gain denies. Weight loss denies.    Respiratory:  Shortness of breath denies.    Cardiovascular:  Chest pain denies.    Gastrointestinal:  Constipation denies. Diarrhea denies. Nausea denies. Vomiting denies.     Hematology:  Easy bruising denies. Prolonged bleeding denies.     Genitourinary:  Frequent urination denies. Pain in lower back denies. Painful urination denies.     Musculoskeletal:  See HPI for details    Skin:  Rash denies.    Neurologic:  Dizziness denies. Gait abnormalities denies. Seizures denies. Tingling/Numbess denies.    Psychiatric:  Anxiety denies. Depressed mood denies.     Objective:   Vital Signs: There were no vitals filed for this visit.     Physical Exam      General Examination:     Constitutional: The patient is alert and oriented to lace person and time. Mood is pleasant.     Head/Face: Normal facial features normal eyebrows    Eyes: Normal extraocular motion bilaterally    Lungs: Respirations are equal and unlabored    Gait is coordinated.    Cardiovascular: There are no swelling or  varicosities present.    Lymphatic: Negative for adenopathy    Skin: Normal    Neurological: Level of consciousness normal. Oriented to place person and time and situation    Psychiatric: Oriented to time place person and situation    Physical exam is unchanged: Cervical exam shows moderate tenderness right inferior cervical paraspinous muscles and levator scapulae mild pain rotation movement Spurling's maneuver negative    XRAY Report/ Interpretation:  No new studies today      Assessment:       1. History of fusion of cervical spine    2. History of fall    3. Neck pain, chronic    4. Cervical facet syndrome        Plan:       Osman was seen today for pain.    Diagnoses and all orders for this visit:    History of fusion of cervical spine    History of fall    Neck pain, chronic    Cervical facet syndrome         No follow-ups on file.  Lumbar macro scribeContinue with activity as tolerated and follow-up in 6 months or sooner if needed.    Treatment options were discussed with regards to the nature of the medical condition. Conservative pain intervention and surgical options were discussed in detail. The probability of success of each separate treatment option was discussed. The patient expressed a clear understanding of the treatment options. With regards to surgery, the procedure risk, benefits, complications, and outcomes were discussed. No guarantees were given with regards to surgical outcome.   The risk of complications, morbidity, and mortality of patient management decisions have been made at the time of this visit. These are associated with the patient's problems, diagnostic procedures and treatment options. This includes the possible management options selected and those considered but not selected by the patient after shared medical decision making we discussed with the patient.     This note was created using Dragon voice recognition software that occasionally misinterpreted phrases or words.

## 2022-09-01 ENCOUNTER — HOSPITAL ENCOUNTER (EMERGENCY)
Facility: HOSPITAL | Age: 32
Discharge: HOME | End: 2022-09-01
Attending: EMERGENCY MEDICINE
Payer: COMMERCIAL

## 2022-09-01 VITALS
OXYGEN SATURATION: 98 % | WEIGHT: 162 LBS | SYSTOLIC BLOOD PRESSURE: 115 MMHG | TEMPERATURE: 98 F | RESPIRATION RATE: 16 BRPM | HEART RATE: 70 BPM | BODY MASS INDEX: 27.81 KG/M2 | DIASTOLIC BLOOD PRESSURE: 70 MMHG

## 2022-09-01 DIAGNOSIS — D64.9 ANEMIA, UNSPECIFIED TYPE: Primary | ICD-10-CM

## 2022-09-01 DIAGNOSIS — R06.02 SOB (SHORTNESS OF BREATH): ICD-10-CM

## 2022-09-01 DIAGNOSIS — F41.9 ANXIETY: ICD-10-CM

## 2022-09-01 DIAGNOSIS — R07.9 CHEST PAIN: ICD-10-CM

## 2022-09-01 LAB
ALBUMIN SERPL BCP-MCNC: 4.3 G/DL (ref 3.5–5.2)
ALP SERPL-CCNC: 57 U/L (ref 55–135)
ALT SERPL W/O P-5'-P-CCNC: 6 U/L (ref 10–44)
ANION GAP SERPL CALC-SCNC: 12 MMOL/L (ref 8–16)
AST SERPL-CCNC: 14 U/L (ref 10–40)
B-HCG UR QL: NEGATIVE
BASOPHILS # BLD AUTO: 0.04 K/UL (ref 0–0.2)
BASOPHILS NFR BLD: 0.5 % (ref 0–1.9)
BILIRUB SERPL-MCNC: 0.9 MG/DL (ref 0.1–1)
BUN SERPL-MCNC: 8 MG/DL (ref 6–20)
CALCIUM SERPL-MCNC: 9.6 MG/DL (ref 8.7–10.5)
CHLORIDE SERPL-SCNC: 104 MMOL/L (ref 95–110)
CO2 SERPL-SCNC: 25 MMOL/L (ref 23–29)
CREAT SERPL-MCNC: 0.8 MG/DL (ref 0.5–1.4)
CTP QC/QA: YES
DIFFERENTIAL METHOD: ABNORMAL
EOSINOPHIL # BLD AUTO: 0 K/UL (ref 0–0.5)
EOSINOPHIL NFR BLD: 0.3 % (ref 0–8)
ERYTHROCYTE [DISTWIDTH] IN BLOOD BY AUTOMATED COUNT: 13.6 % (ref 11.5–14.5)
EST. GFR  (NO RACE VARIABLE): >60 ML/MIN/1.73 M^2
GLUCOSE SERPL-MCNC: 90 MG/DL (ref 70–110)
HCT VFR BLD AUTO: 35.4 % (ref 37–48.5)
HGB BLD-MCNC: 11.5 G/DL (ref 12–16)
IMM GRANULOCYTES # BLD AUTO: 0.02 K/UL (ref 0–0.04)
IMM GRANULOCYTES NFR BLD AUTO: 0.3 % (ref 0–0.5)
LYMPHOCYTES # BLD AUTO: 2.4 K/UL (ref 1–4.8)
LYMPHOCYTES NFR BLD: 31.5 % (ref 18–48)
MCH RBC QN AUTO: 29.9 PG (ref 27–31)
MCHC RBC AUTO-ENTMCNC: 32.5 G/DL (ref 32–36)
MCV RBC AUTO: 92 FL (ref 82–98)
MONOCYTES # BLD AUTO: 0.3 K/UL (ref 0.3–1)
MONOCYTES NFR BLD: 3.9 % (ref 4–15)
NEUTROPHILS # BLD AUTO: 4.9 K/UL (ref 1.8–7.7)
NEUTROPHILS NFR BLD: 63.5 % (ref 38–73)
NRBC BLD-RTO: 0 /100 WBC
PLATELET # BLD AUTO: 300 K/UL (ref 150–450)
PMV BLD AUTO: 10.7 FL (ref 9.2–12.9)
POTASSIUM SERPL-SCNC: 3.9 MMOL/L (ref 3.5–5.1)
PROT SERPL-MCNC: 7.6 G/DL (ref 6–8.4)
RBC # BLD AUTO: 3.84 M/UL (ref 4–5.4)
SODIUM SERPL-SCNC: 141 MMOL/L (ref 136–145)
TROPONIN I SERPL DL<=0.01 NG/ML-MCNC: <0.006 NG/ML (ref 0–0.03)
TROPONIN I SERPL DL<=0.01 NG/ML-MCNC: <0.006 NG/ML (ref 0–0.03)
WBC # BLD AUTO: 7.63 K/UL (ref 3.9–12.7)

## 2022-09-01 PROCEDURE — 84484 ASSAY OF TROPONIN QUANT: CPT | Mod: 91 | Performed by: NURSE PRACTITIONER

## 2022-09-01 PROCEDURE — 80053 COMPREHEN METABOLIC PANEL: CPT | Performed by: NURSE PRACTITIONER

## 2022-09-01 PROCEDURE — 81025 URINE PREGNANCY TEST: CPT | Performed by: NURSE PRACTITIONER

## 2022-09-01 PROCEDURE — 99285 EMERGENCY DEPT VISIT HI MDM: CPT | Mod: 25

## 2022-09-01 PROCEDURE — 93010 ELECTROCARDIOGRAM REPORT: CPT | Mod: ,,, | Performed by: INTERNAL MEDICINE

## 2022-09-01 PROCEDURE — 93005 ELECTROCARDIOGRAM TRACING: CPT

## 2022-09-01 PROCEDURE — 85025 COMPLETE CBC W/AUTO DIFF WBC: CPT | Performed by: NURSE PRACTITIONER

## 2022-09-01 PROCEDURE — 25000003 PHARM REV CODE 250: Performed by: NURSE PRACTITIONER

## 2022-09-01 PROCEDURE — 93010 EKG 12-LEAD: ICD-10-PCS | Mod: ,,, | Performed by: INTERNAL MEDICINE

## 2022-09-01 RX ORDER — LORAZEPAM 0.5 MG/1
0.5 TABLET ORAL
Status: COMPLETED | OUTPATIENT
Start: 2022-09-01 | End: 2022-09-01

## 2022-09-01 RX ADMIN — LORAZEPAM 0.5 MG: 0.5 TABLET ORAL at 06:09

## 2022-09-01 NOTE — ED PROVIDER NOTES
Encounter Date: 9/1/2022       History     Chief Complaint   Patient presents with    Chest Pain     Pt c/o chest tightness that started this morning when driving her child to school. Pt also has some shortness of breath and anxiety. Pt denies any radiation. Pt states she feels shaky.      32 yr old female presents to the ER with reports of fatigue and SOB at times. PT states she has been having issues with vaginal bleeding and is anemic. Reports concerns for anemia at this time. Pt is followed per GYN at [facility]. Denies fever. No vomiting or diarrhea. No urinary symptoms. Pt has PMH of asthma.     The history is provided by the patient. No  was used.   Review of patient's allergies indicates:   Allergen Reactions    Adhesive tape-silicones Hives     Past Medical History:   Diagnosis Date    Asthma      Past Surgical History:   Procedure Laterality Date    ANTERIOR CERVICAL DISCECTOMY W/ FUSION N/A 6/28/2018    Procedure: DISCECTOMY, SPINE, CERVICAL, ANTERIOR APPROACH, ARTHROPLASTY C4-5;  Surgeon: Andrew Brennan MD;  Location: [facility] OR;  Service: Orthopedics;  Laterality: N/A;    ANTERIOR CERVICAL DISCECTOMY W/ FUSION N/A 8/1/2019    Procedure: DISCECTOMY, SPINE, CERVICAL, ANTERIOR APPROACH, WITH FUSION C4-5;  Surgeon: Andrew Brennan MD;  Location: [facility] OR;  Service: Orthopedics;  Laterality: N/A;    BONE GRAFT N/A 8/1/2019    Procedure: BONE GRAFT;  Surgeon: Andrew Brennan MD;  Location: [facility] OR;  Service: Orthopedics;  Laterality: N/A;    FUSION OF SPINE WITH INSTRUMENTATION N/A 8/1/2019    Procedure: FUSION, SPINE, WITH INSTRUMENTATION C4-5;  Surgeon: Andrew Brennan MD;  Location: [facility] OR;  Service: Orthopedics;  Laterality: N/A;    medial branch block      SPINAL FUSION      TONSILLECTOMY       No family history on file.  Social History     Tobacco Use    Smoking status: Never    Smokeless tobacco: Never   Substance Use Topics    Alcohol use: No    Drug use: No     Review of Systems    Constitutional:  Positive for fatigue.   HENT: Negative.     Respiratory: Negative.     Cardiovascular: Negative.    Gastrointestinal: Negative.    Genitourinary:  Positive for vaginal bleeding.   Musculoskeletal: Negative.    Skin: Negative.    Neurological:  Positive for weakness.     Physical Exam     Initial Vitals [09/01/22 1514]   BP Pulse Resp Temp SpO2   121/63 70 18 99.1 °F (37.3 °C) 98 %      MAP       --         Physical Exam    Constitutional: She appears well-developed and well-nourished.   HENT:   Head: Normocephalic.   Right Ear: Hearing and tympanic membrane normal.   Left Ear: Hearing and tympanic membrane normal.   Nose: Nose normal.   Mouth/Throat: Oropharynx is clear and moist.   Eyes: Lids are normal. Pupils are equal, round, and reactive to light.   Neck:   Normal range of motion.  Cardiovascular:  Normal rate.           Pulmonary/Chest: Breath sounds normal. No respiratory distress. She has no wheezes. She has no rhonchi.   Abdominal: Abdomen is soft. There is no abdominal tenderness.   Musculoskeletal:         General: Normal range of motion.      Cervical back: Normal range of motion. No rigidity.     Neurological: She is alert and oriented to person, place, and time.   Skin: Skin is warm and dry. No rash noted.   Psychiatric: She has a normal mood and affect. Her behavior is normal. Judgment and thought content normal.       ED Course   Procedures  Labs Reviewed   CBC W/ AUTO DIFFERENTIAL - Abnormal; Notable for the following components:       Result Value    RBC 3.84 (*)     Hemoglobin 11.5 (*)     Hematocrit 35.4 (*)     Mono % 3.9 (*)     All other components within normal limits   COMPREHENSIVE METABOLIC PANEL - Abnormal; Notable for the following components:    ALT 6 (*)     All other components within normal limits   TROPONIN I   TROPONIN I   POCT URINE PREGNANCY     EKG Readings: (Independently Interpreted)   Rhythm: Normal Sinus Rhythm. Ectopy: No Ectopy. Conduction: Normal. ST  Segments: Normal ST Segments. T Waves: Normal. Clinical Impression: Normal Sinus Rhythm   Signed per Dr Barragan     Imaging Results              X-Ray Chest 1 View (Final result)  Result time 09/01/22 19:00:13      Final result by Devin Cervantes MD (09/01/22 19:00:13)                   Impression:      1. No acute cardiopulmonary process.      Electronically signed by: Devin Cervantes MD  Date:    09/01/2022  Time:    19:00               Narrative:    EXAMINATION:  XR CHEST 1 VIEW    CLINICAL HISTORY:  Chest pain, unspecified    TECHNIQUE:  Single frontal view of the chest was performed.    COMPARISON:  07/30/2019    FINDINGS:  The cardiomediastinal silhouette is not enlarged.  There is no pleural effusion.  The trachea is midline.  The lungs are symmetrically expanded bilaterally without evidence of acute parenchymal process. No large focal consolidation seen.  There is no pneumothorax.  The osseous structures are remarkable for dextroscoliotic curvature of the spine..                                       Medications   LORazepam tablet 0.5 mg (0.5 mg Oral Given 9/1/22 1811)     Medical Decision Making:   Clinical Tests:   Lab Tests: Ordered and Reviewed  Radiological Study: Ordered and Reviewed           ED Course as of 09/01/22 2031   Thu Sep 01, 2022   1803 Pt notified the results of her testing. Pt states she is feeling some heaviness like chest pain, anxiety at this time. PT states she is not stressed over anything particular. Ativan ordered.  [DT]   1941 Repeat troponin to be obtained. Chart reviewed with Dr Barragan.  [DT]   2030 Repeat troponin is negative. Pt is to follow up with Dr Edgar for iron infusion that is scheduled in addition to any further work up. Also follow up with GYN which she has been seeing for intermittent vaginal bleeding.  [DT]      ED Course User Index  [DT] Marichuy Boucher, FRANKLIN             Clinical Impression:   Final diagnoses:  [R06.02] SOB (shortness of breath)  [R07.9] Chest  pain  [D64.9] Anemia, unspecified type (Primary)  [F41.9] Anxiety        ED Disposition Condition    Discharge Stable          ED Prescriptions    None       Follow-up Information       Follow up With Specialties Details Why Contact Info    Aparna Garcia MD Family Medicine Schedule an appointment as soon as possible for a visit in 2 days  [address]  [address]  [facility]  [facility] [address]  [phone]      Josue Edgar MD Hematology and Oncology Schedule an appointment as soon as possible for a visit in 2 days  [address]  [facility] [address]  [phone]               Marichuy Boucher, FRANKLIN  09/01/22 2031

## 2022-09-02 NOTE — DISCHARGE INSTRUCTIONS

## 2022-10-03 ENCOUNTER — OFFICE VISIT (OUTPATIENT)
Dept: ORTHOPEDICS | Facility: CLINIC | Age: 32
End: 2022-10-03
Payer: COMMERCIAL

## 2022-10-03 VITALS
DIASTOLIC BLOOD PRESSURE: 66 MMHG | HEIGHT: 64 IN | WEIGHT: 162 LBS | SYSTOLIC BLOOD PRESSURE: 112 MMHG | BODY MASS INDEX: 27.66 KG/M2

## 2022-10-03 DIAGNOSIS — S16.1XXA CERVICAL STRAIN, ACUTE, INITIAL ENCOUNTER: ICD-10-CM
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
## 2022-10-03 DIAGNOSIS — M50.20 HNP (HERNIATED NUCLEUS PULPOSUS), CERVICAL: ICD-10-CM

## 2022-10-03 DIAGNOSIS — Z98.1 HISTORY OF FUSION OF CERVICAL SPINE: ICD-10-CM

## 2022-10-03 PROCEDURE — 1160F RVW MEDS BY RX/DR IN RCRD: CPT | Mod: CPTII,S$GLB,, | Performed by: ORTHOPAEDIC SURGERY

## 2022-10-03 PROCEDURE — 1160F PR REVIEW ALL MEDS BY PRESCRIBER/CLIN PHARMACIST DOCUMENTED: ICD-10-PCS | Mod: CPTII,S$GLB,, | Performed by: ORTHOPAEDIC SURGERY

## 2022-10-03 PROCEDURE — 3078F DIAST BP <80 MM HG: CPT | Mod: CPTII,S$GLB,, | Performed by: ORTHOPAEDIC SURGERY

## 2022-10-03 PROCEDURE — 3074F PR MOST RECENT SYSTOLIC BLOOD PRESSURE < 130 MM HG: ICD-10-PCS | Mod: CPTII,S$GLB,, | Performed by: ORTHOPAEDIC SURGERY

## 2022-10-03 PROCEDURE — 1159F PR MEDICATION LIST DOCUMENTED IN MEDICAL RECORD: ICD-10-PCS | Mod: CPTII,S$GLB,, | Performed by: ORTHOPAEDIC SURGERY

## 2022-10-03 PROCEDURE — 3078F PR MOST RECENT DIASTOLIC BLOOD PRESSURE < 80 MM HG: ICD-10-PCS | Mod: CPTII,S$GLB,, | Performed by: ORTHOPAEDIC SURGERY

## 2022-10-03 PROCEDURE — 99213 PR OFFICE/OUTPT VISIT, EST, LEVL III, 20-29 MIN: ICD-10-PCS | Mod: S$GLB,,, | Performed by: ORTHOPAEDIC SURGERY

## 2022-10-03 PROCEDURE — 1159F MED LIST DOCD IN RCRD: CPT | Mod: CPTII,S$GLB,, | Performed by: ORTHOPAEDIC SURGERY

## 2022-10-03 PROCEDURE — 99213 OFFICE O/P EST LOW 20 MIN: CPT | Mod: S$GLB,,, | Performed by: ORTHOPAEDIC SURGERY

## 2022-10-03 PROCEDURE — 3008F BODY MASS INDEX DOCD: CPT | Mod: CPTII,S$GLB,, | Performed by: ORTHOPAEDIC SURGERY

## 2022-10-03 PROCEDURE — 3008F PR BODY MASS INDEX (BMI) DOCUMENTED: ICD-10-PCS | Mod: CPTII,S$GLB,, | Performed by: ORTHOPAEDIC SURGERY

## 2022-10-03 PROCEDURE — 3074F SYST BP LT 130 MM HG: CPT | Mod: CPTII,S$GLB,, | Performed by: ORTHOPAEDIC SURGERY
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
## 2023-12-24 ENCOUNTER — HOSPITAL ENCOUNTER (EMERGENCY)
Facility: HOSPITAL | Age: 33
Discharge: HOME | End: 2023-12-24
Attending: EMERGENCY MEDICINE
Payer: COMMERCIAL

## 2023-12-24 VITALS
BODY MASS INDEX: 26.43 KG/M2 | WEIGHT: 154 LBS | RESPIRATION RATE: 20 BRPM | TEMPERATURE: 99 F | DIASTOLIC BLOOD PRESSURE: 75 MMHG | OXYGEN SATURATION: 98 % | SYSTOLIC BLOOD PRESSURE: 124 MMHG | HEART RATE: 88 BPM

## 2023-12-24 DIAGNOSIS — U07.1 COVID-19: Primary | ICD-10-CM

## 2023-12-24 DIAGNOSIS — R05.9 COUGH: ICD-10-CM

## 2023-12-24 LAB
CTP QC/QA: YES
CTP QC/QA: YES
GROUP A STREP, MOLECULAR: NEGATIVE
POC MOLECULAR INFLUENZA A AGN: NEGATIVE
POC MOLECULAR INFLUENZA B AGN: NEGATIVE
SARS-COV-2 RDRP RESP QL NAA+PROBE: POSITIVE

## 2023-12-24 PROCEDURE — 87502 INFLUENZA DNA AMP PROBE: CPT

## 2023-12-24 PROCEDURE — 99284 EMERGENCY DEPT VISIT MOD MDM: CPT | Mod: 25

## 2023-12-24 PROCEDURE — 25000242 PHARM REV CODE 250 ALT 637 W/ HCPCS: Performed by: NURSE PRACTITIONER

## 2023-12-24 PROCEDURE — 87635 SARS-COV-2 COVID-19 AMP PRB: CPT | Performed by: NURSE PRACTITIONER

## 2023-12-24 PROCEDURE — 87651 STREP A DNA AMP PROBE: CPT | Performed by: NURSE PRACTITIONER

## 2023-12-24 PROCEDURE — 94640 AIRWAY INHALATION TREATMENT: CPT

## 2023-12-24 RX ORDER — PREDNISONE 20 MG/1
40 TABLET ORAL DAILY
Qty: 10 TABLET | Refills: 0 | Status: SHIPPED | OUTPATIENT
Start: 2023-12-24 | End: 2023-12-29

## 2023-12-24 RX ORDER — BROMPHENIRAMINE MALEATE, PSEUDOEPHEDRINE HYDROCHLORIDE, AND DEXTROMETHORPHAN HYDROBROMIDE 2; 30; 10 MG/5ML; MG/5ML; MG/5ML
10 SYRUP ORAL 3 TIMES DAILY PRN
Qty: 118 ML | Refills: 0 | Status: SHIPPED | OUTPATIENT
Start: 2023-12-24 | End: 2024-01-03

## 2023-12-24 RX ORDER — IPRATROPIUM BROMIDE AND ALBUTEROL SULFATE 2.5; .5 MG/3ML; MG/3ML
3 SOLUTION RESPIRATORY (INHALATION)
Status: COMPLETED | OUTPATIENT
Start: 2023-12-24 | End: 2023-12-24

## 2023-12-24 RX ORDER — ALBUTEROL SULFATE 90 UG/1
1-2 AEROSOL, METERED RESPIRATORY (INHALATION) EVERY 6 HOURS PRN
Qty: 8 G | Refills: 0 | Status: SHIPPED | OUTPATIENT
Start: 2023-12-24 | End: 2024-12-23

## 2023-12-24 RX ORDER — BENZONATATE 100 MG/1
200 CAPSULE ORAL 3 TIMES DAILY PRN
Qty: 20 CAPSULE | Refills: 0 | Status: SHIPPED | OUTPATIENT
Start: 2023-12-24 | End: 2023-12-24 | Stop reason: ALTCHOICE

## 2023-12-24 RX ADMIN — IPRATROPIUM BROMIDE AND ALBUTEROL SULFATE 3 ML: .5; 3 SOLUTION RESPIRATORY (INHALATION) at 11:12

## 2023-12-24 NOTE — FIRST PROVIDER EVALUATION
Emergency Department TeleTriage Encounter Note      CHIEF COMPLAINT    Chief Complaint   Patient presents with    Cough     Pt dx with sinus infection on 12/8, completed antibiotics, pt reports nasal congestion and cough continues, last week dx with bronchitis, + HA and sore throat, denies fever        VITAL SIGNS   Initial Vitals [12/24/23 1047]   BP Pulse Resp Temp SpO2   124/75 100 20 98.5 °F (36.9 °C) 98 %      MAP       --            ALLERGIES    Review of patient's allergies indicates:   Allergen Reactions    Adhesive tape-silicones Hives       PROVIDER TRIAGE NOTE  TeleTriage Note: Osman Rm, a nontoxic/well appearing, 33 y.o. female, presented to the ED with c/o cough, congestion, and body aches since Dec. 8th. Pt states she is having SOB off and on. Hx of asthma.     All ED beds are full at present; patient notified of this status.  Patient seen and medically screened by Nurse Practitioner via teletriage. Orders initiated at triage to expedite care.  Patient is stable to return to the waiting room and will be placed in an ED bed when available.  Care will be transferred to an alternate provider when patient has been placed in an Exam Room from the [facility] for physical exam, additional orders, and disposition.  10:51 AM Laura Ash DNP, FNP-C        ORDERS  Labs Reviewed - No data to display    ED Orders (720h ago, onward)      Start Ordered     Status Ordering Provider    12/24/23 1100 12/24/23 1053  albuterol-ipratropium 2.5 mg-0.5 mg/3 mL nebulizer solution 3 mL  ED 1 Time         Ordered LAURA ASH    12/24/23 1053 12/24/23 1053  X-Ray Chest PA And Lateral  1 time imaging         Ordered LAURA ASH              Virtual Visit Note: The provider triage portion of this emergency department evaluation and documentation was performed via [employer], a HIPAA-compliant telemedicine application, in concert with a tele-presenter in the room. A face to face patient evaluation with one of  my colleagues will occur once the patient is placed in an emergency department room.      DISCLAIMER: This note was prepared with [employer] voice recognition transcription software. Garbled syntax, mangled pronouns, and other bizarre constructions may be attributed to that software system.

## 2023-12-24 NOTE — ED PROVIDER NOTES
Encounter Date: 12/24/2023       History     Chief Complaint   Patient presents with    Cough     Pt dx with sinus infection on 12/8, completed antibiotics, pt reports nasal congestion and cough continues, last week dx with bronchitis, + HA and sore throat, denies fever      33-year-old female presents emergency room with reports of cough, congestion that began on December 8th.  Patient reports that she was initially diagnosed with a sinus infection and treated with antibiotics.  She reports that the week following she was diagnosed with bronchitis however no additional treatments or prescribed.  Patient states uses a rescue inhaler with some improvement of her condition.  She denies hemoptysis.  Patient denies any cardiac chest pain.  He denies fever, rash or neck stiffness.  She denies nausea, vomiting or diarrhea.  Patient does report occasional sore throat.    The history is provided by the patient. No  was used.     Review of patient's allergies indicates:   Allergen Reactions    Adhesive tape-silicones Hives     Past Medical History:   Diagnosis Date    Asthma      Past Surgical History:   Procedure Laterality Date    ANTERIOR CERVICAL DISCECTOMY W/ FUSION N/A 6/28/2018    Procedure: DISCECTOMY, SPINE, CERVICAL, ANTERIOR APPROACH, ARTHROPLASTY C4-5;  Surgeon: Andrew Brennan MD;  Location: [facility] OR;  Service: Orthopedics;  Laterality: N/A;    ANTERIOR CERVICAL DISCECTOMY W/ FUSION N/A 8/1/2019    Procedure: DISCECTOMY, SPINE, CERVICAL, ANTERIOR APPROACH, WITH FUSION C4-5;  Surgeon: Andrew Brennan MD;  Location: [facility] OR;  Service: Orthopedics;  Laterality: N/A;    BONE GRAFT N/A 8/1/2019    Procedure: BONE GRAFT;  Surgeon: Andrew Brennan MD;  Location: [facility] OR;  Service: Orthopedics;  Laterality: N/A;    FUSION OF SPINE WITH INSTRUMENTATION N/A 8/1/2019    Procedure: FUSION, SPINE, WITH INSTRUMENTATION C4-5;  Surgeon: Andrew Brennan MD;  Location: [facility] OR;  Service: Orthopedics;   Laterality: N/A;    medial branch block      SPINAL FUSION      TONSILLECTOMY       No family history on file.  Social History     Tobacco Use    Smoking status: Never    Smokeless tobacco: Never   Substance Use Topics    Alcohol use: No    Drug use: No     Review of Systems    Physical Exam     Initial Vitals [12/24/23 1047]   BP Pulse Resp Temp SpO2   124/75 100 20 98.5 °F (36.9 °C) 98 %      MAP       --         Physical Exam    Constitutional: She appears well-developed and well-nourished.   HENT:   Head: Normocephalic.   Right Ear: Hearing and tympanic membrane normal.   Left Ear: Hearing and tympanic membrane normal.   Nose: Rhinorrhea present.   Mouth/Throat: No oral lesions. No trismus in the jaw. No uvula swelling. Posterior oropharyngeal erythema present.   Eyes: Lids are normal. Pupils are equal, round, and reactive to light.   Neck:   Normal range of motion.  Cardiovascular:  Normal rate.           Pulmonary/Chest: Breath sounds normal. No respiratory distress. She has no wheezes. She has no rhonchi.   Abdominal: Abdomen is soft. There is no abdominal tenderness.   Musculoskeletal:         General: Normal range of motion.      Cervical back: Normal range of motion. No edema, erythema or rigidity. No spinous process tenderness or muscular tenderness. Normal range of motion.     Neurological: She is alert and oriented to person, place, and time.   Skin: Skin is warm and dry. No rash noted.   Psychiatric: She has a normal mood and affect. Her behavior is normal. Judgment and thought content normal.         ED Course   Procedures  Labs Reviewed   SARS-COV-2 RDRP GENE - Abnormal; Notable for the following components:       Result Value    POC Rapid COVID Positive (*)     All other components within normal limits   GROUP A STREP, MOLECULAR   POCT INFLUENZA A/B MOLECULAR          Imaging Results              X-Ray Chest PA And Lateral (Final result)  Result time 12/24/23 12:34:19      Final result by Caleb  Tony YOON MD (12/24/23 12:34:19)                   Impression:      No radiographic evidence of pneumonia or other source of cough, noting that early/mild viral pneumonia or nonspecific bronchitis may be radiographically occult.      Electronically signed by: Tony Ellis MD  Date:    12/24/2023  Time:    12:34               Narrative:    EXAMINATION:  XR CHEST PA AND LATERAL    CLINICAL HISTORY:  Cough, unspecified    TECHNIQUE:  PA and lateral views of the chest were performed.    COMPARISON:  Chest radiograph 09/01/2022    FINDINGS:  No detrimental change.Bibasilar minimal platelike scarring versus atelectasis.  The lungs are otherwise well expanded without consolidation, pleural effusion or pneumothorax.    The cardiac silhouette is normal in size. The pulmonary vasculature and hilar and mediastinal contours are unremarkable.    Osseous structures appear stable without acute or destructive process seen.                                       Medications   albuterol-ipratropium 2.5 mg-0.5 mg/3 mL nebulizer solution 3 mL (3 mLs Nebulization Given 12/24/23 1132)     Medical Decision Making  Differential Diagnosis includes, but is not limited to:  Viral URI, Strep pharyngitis, viral pharyngitis, foreign body aspiration/ingestion, bronchitis, asthma exacerbation, CHF exacerbation, COPD exacerbation, allergy/atopy, influenza, pertussis, PE, pneumonia, lung abscess, fungal infection, TB, epiglottitis.     Amount and/or Complexity of Data Reviewed  Labs: ordered. Decision-making details documented in ED Course.    Risk  Prescription drug management.               ED Course as of 12/24/23 1306   Sun Dec 24, 2023   1224 POCT COVID-19 Rapid Screening(!) [DT]   1224 POCT Influenza A/B Molecular [DT]   1241 FINDINGS:  No detrimental change.Bibasilar minimal platelike scarring versus atelectasis.  The lungs are otherwise well expanded without consolidation, pleural effusion or pneumothorax.     The cardiac silhouette is normal  in size. The pulmonary vasculature and hilar and mediastinal contours are unremarkable.     Osseous structures appear stable without acute or destructive process seen.     Impression:     No radiographic evidence of pneumonia or other source of cough, noting that early/mild viral pneumonia or nonspecific bronchitis may be radiographically occult.      [DT]   1244 Independent interpretation of chest x-ray identifies no acute findings.  The patient is positive for COVID at this time.  Her symptoms have been present for the past few weeks therefore no additional treatments outside of Tessalon in addition to prednisone and an albuterol inhaler been prescribed.  Strict return precautions have been given otherwise she is stable at this time for discharge [DT]   1248 Group A Strep, Molecular [DT]   1306 Tessalon changed to Bromfed as the pt states she has taken the Tessalon with no improvement [DT]      ED Course User Index  [DT] Marichuy Boucher NP                           Clinical Impression:  Final diagnoses:  [R05.9] Cough  [U07.1] COVID-19 (Primary)          ED Disposition Condition    Discharge Stable          ED Prescriptions       Medication Sig Dispense Start Date End Date Auth. Provider    benzonatate (TESSALON) 100 MG capsule  (Status: Discontinued) Take 2 capsules (200 mg total) by mouth 3 (three) times daily as needed for Cough. 20 capsule 12/24/2023 12/24/2023 Marichuy Boucher, NP    albuterol (PROVENTIL/VENTOLIN HFA) 90 mcg/actuation inhaler Inhale 1-2 puffs into the lungs every 6 (six) hours as needed for Wheezing. Rescue 8 g 12/24/2023 12/23/2024 Marichuy Boucher NP    predniSONE (DELTASONE) 20 MG tablet Take 2 tablets (40 mg total) by mouth once daily. for 5 days 10 tablet 12/24/2023 12/29/2023 Marichuy Boucher NP    brompheniramine-pseudoeph-DM (BROMFED DM) 2-30-10 mg/5 mL Syrp Take 10 mLs by mouth 3 (three) times daily as needed (cough). 118 mL 12/24/2023 1/3/2024 Marichuy Boucher, [place]           Follow-up Information       Follow up With Specialties Details Why Contact Info    Aparna Garcia MD Family Medicine Schedule an appointment as soon as possible for a visit in 2 days  [address]  [address]  [facility] [address]  [phone]               Marichuy Boucher, [place]  12/24/23 1248       Marichuy Boucher NP  12/24/23 1302

## 2023-12-24 NOTE — Clinical Note
"Osman "Richard Rm was seen and treated in our emergency department on 12/24/2023.     COVID-19 is present in our communities across the state. There is limited testing for COVID at this time, so not all patients can be tested. In this situation, your employee meets the following criteria:    Osman Rm has met the criteria for COVID-19 testing and has a POSITIVE result. She can return to work once they are asymptomatic for 24 hours without the use of fever reducing medications AND at least five days from the first positive result. A mask is recommended for 5 days post quarantine.     If you have any questions or concerns, or if I can be of further assistance, please do not hesitate to contact me.    Sincerely,             Marichuy Boucher NP"

## 2024-03-13 ENCOUNTER — OFFICE VISIT (OUTPATIENT)
Dept: ORTHOPEDICS | Facility: CLINIC | Age: 34
End: 2024-03-13
Payer: COMMERCIAL

## 2024-03-13 ENCOUNTER — PATIENT MESSAGE (OUTPATIENT)
Dept: ORTHOPEDICS | Facility: CLINIC | Age: 34
End: 2024-03-13

## 2024-03-13 VITALS — HEIGHT: 64 IN | WEIGHT: 154.13 LBS | BODY MASS INDEX: 26.31 KG/M2

## 2024-03-13 DIAGNOSIS — M54.12 CERVICAL RADICULOPATHY: ICD-10-CM

## 2024-03-13 DIAGNOSIS — Z98.1 HISTORY OF FUSION OF CERVICAL SPINE: Primary | ICD-10-CM

## 2024-03-13 PROCEDURE — 1159F MED LIST DOCD IN RCRD: CPT | Mod: CPTII,S$GLB,, | Performed by: ORTHOPAEDIC SURGERY

## 2024-03-13 PROCEDURE — 99213 OFFICE O/P EST LOW 20 MIN: CPT | Mod: S$GLB,,, | Performed by: ORTHOPAEDIC SURGERY

## 2024-03-13 PROCEDURE — 3008F BODY MASS INDEX DOCD: CPT | Mod: CPTII,S$GLB,, | Performed by: ORTHOPAEDIC SURGERY

## 2024-03-13 PROCEDURE — 1160F RVW MEDS BY RX/DR IN RCRD: CPT | Mod: CPTII,S$GLB,, | Performed by: ORTHOPAEDIC SURGERY

## 2024-03-13 NOTE — PROGRESS NOTES
Subjective:       Patient ID: Osman Rm is a 33 y.o. female.    Chief Complaint: Pain of the Neck (Cervical pain )      History of Present Illness    Prior to meeting with the patient I reviewed the medical chart in [facility]. This included reviewing the previous progress notes from our office, review of the patient's last appointment with their primary care provider, review of any visits to the emergency room, and review of any pain management appointments or procedures.   Ms. Rm comes in today with a chief complaint neck paresthesias into her right shoulder.  She has a history of C4-5 fusion several years ago.  This was generally doing very well her she fall years.  This seems to have exacerbated neck pain.  She had a repeat MRI at that time and was referred to Dr. Castillo where she has had multiple interventional pain management procedures.  I am not exactly 100% certain which procedures however.  She is recently begun to experience paresthesias into her right shoulder.  This is new for her.  It does occur while she is sleeping as well.  It does not go down into the right arm.    Current Medications  Current Outpatient Medications   Medication Sig Dispense Refill    albuterol (PROVENTIL/VENTOLIN HFA) 90 mcg/actuation inhaler Inhale 1-2 puffs into the lungs every 6 (six) hours as needed for Wheezing. Rescue (Patient not taking: Reported on 3/13/2024) 8 g 0     No current facility-administered medications for this visit.       Allergies  Review of patient's allergies indicates:   Allergen Reactions    Adhesive tape-silicones Hives       Past Medical History  Past Medical History:   Diagnosis Date    Asthma        Surgical History  Past Surgical History:   Procedure Laterality Date    ANTERIOR CERVICAL DISCECTOMY W/ FUSION N/A 6/28/2018    Procedure: DISCECTOMY, SPINE, CERVICAL, ANTERIOR APPROACH, ARTHROPLASTY C4-5;  Surgeon: Andrew Brennan MD;  Location: [facility];  Service: Orthopedics;  Laterality: N/A;     ANTERIOR CERVICAL DISCECTOMY W/ FUSION N/A 8/1/2019    Procedure: DISCECTOMY, SPINE, CERVICAL, ANTERIOR APPROACH, WITH FUSION C4-5;  Surgeon: Andrew Brennan MD;  Location: [facility] OR;  Service: Orthopedics;  Laterality: N/A;    BONE GRAFT N/A 8/1/2019    Procedure: BONE GRAFT;  Surgeon: Andrew Brennan MD;  Location: [facility] OR;  Service: Orthopedics;  Laterality: N/A;    FUSION OF SPINE WITH INSTRUMENTATION N/A 8/1/2019    Procedure: FUSION, SPINE, WITH INSTRUMENTATION C4-5;  Surgeon: Andrew Brennan MD;  Location: [facility] OR;  Service: Orthopedics;  Laterality: N/A;    medial branch block      SPINAL FUSION      TONSILLECTOMY         Family History:   History reviewed. No pertinent family history.    Social History:   Social History     Socioeconomic History    Marital status:    Tobacco Use    Smoking status: Never    Smokeless tobacco: Never   Substance and Sexual Activity    Alcohol use: No    Drug use: No    Sexual activity: Yes     Partners: Male     Birth control/protection: Abstinence       Hospitalization/Major Diagnostic Procedure:     Review of Systems     General/Constitutional:  Chills denies. Fatigue denies. Fever denies. Weight gain denies. Weight loss denies.    Respiratory:  Shortness of breath denies.    Cardiovascular:  Chest pain denies.    Gastrointestinal:  Constipation denies. Diarrhea denies. Nausea denies. Vomiting denies.     Hematology:  Easy bruising denies. Prolonged bleeding denies.     Genitourinary:  Frequent urination denies. Pain in lower back denies. Painful urination denies.     Musculoskeletal:  See HPI for details    Skin:  Rash denies.    Neurologic:  Dizziness denies. Gait abnormalities denies. Seizures denies. Tingling/Numbess denies.    Psychiatric:  Anxiety denies. Depressed mood denies.     Objective:   Vital Signs: There were no vitals filed for this visit.     Physical Exam      General Examination:     Constitutional: The patient is alert and oriented to lace person  and time. Mood is pleasant.     Head/Face: Normal facial features normal eyebrows    Eyes: Normal extraocular motion bilaterally    Lungs: Respirations are equal and unlabored    Gait is coordinated.    Cardiovascular: There are no swelling or varicosities present.    Lymphatic: Negative for adenopathy    Skin: Normal    Neurological: Level of consciousness normal. Oriented to place person and time and situation    Psychiatric: Oriented to time place person and situation    C-spine exam: Skin throughout the neck is dry and intact.  There is no erythema or ecchymosis.  No signs or symptoms of infection.  Anterior cervical incision well healed without wound dehiscence or drainage.  She is neurovascularly intact throughout bilateral upper extremities.  She has well-preserved range of motion of the cervical spine with flexion/extension, rotation maneuvers, and lateral bending bilaterally.  She does have some discomfort at the extremes of motion.  Slight cervical crepitus.  Negative Spurling these.   strength in both hands with grade 5/5 and is equal.    XRAY Report/ Interpretation :  Two views taken of the cervical spine today: AP and lateral view.  They reveal anterior fusion at C4-5 with interbody placement without evidence of hardware failure or interbody subsidence.  She does have an anterior osteophyte at C3-4 which is a bit larger than last imaging a year and a half ago.  No acute fractures or dislocations seen.    Assessment:       1. History of fusion of cervical spine    2. Cervical radiculopathy        Plan:       Osman was seen today for pain.    Diagnoses and all orders for this visit:    History of fusion of cervical spine  -     X-Ray Cervical Spine AP And Lateral  -     MRI Cervical Spine Without Contrast; Future  -     MRI Cervical Spine Without Contrast    Cervical radiculopathy  -     MRI Cervical Spine Without Contrast; Future  -     MRI Cervical Spine Without Contrast         Follow up for 2  week f/u - cervical MRI results.  This is to attest that the physician's assistant Cornelius Wall  served in the capacity as a scribe for this patient's encounter.  This is also to verify that I have reviewed the patient's history and helped formulate the treatment plan and discussed it with the physician's assistant.  I have actively participated in the evaluation and treatment plan for this patient.  The visit plan and medical decision-making is as outlined below  Patient is having worsening her neck pain and radicular symptoms into the right upper extremity.  She is tried various NSAIDs and interventional pain management procedures without resolution of symptoms.  I have asked her to obtain all procedure reports of her cervical spine injections so I know exactly what was done and when.  I would also like to proceed with an updated MRI of her cervical spine to evaluate for any stenosis.  We will have her follow-up once the MRI is done and review images/report as well as procedure notes from her pain management procedures on her cervical spine.  We will make further orthopedic treatment recommendations based off that data.      Treatment options were discussed with regards to the nature of the medical condition. Conservative pain intervention and surgical options were discussed in detail. The probability of success of each separate treatment option was discussed. The patient expressed a clear understanding of the treatment options. With regards to surgery, the procedure risk, benefits, complications, and outcomes were discussed. No guarantees were given with regards to surgical outcome.   The risk of complications, morbidity, and mortality of patient management decisions have been made at the time of this visit. These are associated with the patient's problems, diagnostic procedures and treatment options. This includes the possible management options selected and those considered but not selected by the patient  after shared medical decision making we discussed with the patient.   This note was created using Dragon voice recognition software that occasionally misinterpreted phrases or words.

## 2024-03-20 ENCOUNTER — PATIENT MESSAGE (OUTPATIENT)
Dept: ORTHOPEDICS | Facility: CLINIC | Age: 34
End: 2024-03-20

## 2024-04-17 ENCOUNTER — OFFICE VISIT (OUTPATIENT)
Dept: ORTHOPEDICS | Facility: CLINIC | Age: 34
End: 2024-04-17
Payer: COMMERCIAL

## 2024-04-17 ENCOUNTER — TELEPHONE (OUTPATIENT)
Dept: RADIOLOGY | Facility: CLINIC | Age: 34
End: 2024-04-17

## 2024-04-17 VITALS — BODY MASS INDEX: 27.31 KG/M2 | WEIGHT: 160 LBS | HEIGHT: 64 IN

## 2024-04-17 DIAGNOSIS — M47.812 FACET ARTHRITIS OF CERVICAL REGION: Primary | ICD-10-CM

## 2024-04-17 DIAGNOSIS — Z98.1 HISTORY OF FUSION OF CERVICAL SPINE: ICD-10-CM

## 2024-04-17 PROCEDURE — 99213 OFFICE O/P EST LOW 20 MIN: CPT | Mod: S$GLB,,, | Performed by: ORTHOPAEDIC SURGERY

## 2024-04-17 PROCEDURE — 3008F BODY MASS INDEX DOCD: CPT | Mod: CPTII,S$GLB,, | Performed by: ORTHOPAEDIC SURGERY

## 2024-04-17 PROCEDURE — 1159F MED LIST DOCD IN RCRD: CPT | Mod: CPTII,S$GLB,, | Performed by: ORTHOPAEDIC SURGERY

## 2024-04-17 PROCEDURE — 1160F RVW MEDS BY RX/DR IN RCRD: CPT | Mod: CPTII,S$GLB,, | Performed by: ORTHOPAEDIC SURGERY

## 2024-04-17 NOTE — PROGRESS NOTES
Subjective:       Patient ID: Osman Rm is a 33 y.o. female.    Chief Complaint: Pain of the Neck (MRI Cervical results, feeling the same, no arm pain, numbness to right shoulder)      History of Present Illness    Prior to meeting with the patient I reviewed the medical chart in [facility]. This included reviewing the previous progress notes from our office, review of the patient's last appointment with their primary care provider, review of any visits to the emergency room, and review of any pain management appointments or procedures.   Continued right-sided neck pain radiates not quite to the shoulder here to discuss results of MRI patient has had multiple epidural steroid injections by pain management doctor without any long-term relief.  Previous history of C4-5 fusion.    Current Medications  Current Outpatient Medications   Medication Sig Dispense Refill    albuterol (PROVENTIL/VENTOLIN HFA) 90 mcg/actuation inhaler Inhale 1-2 puffs into the lungs every 6 (six) hours as needed for Wheezing. Rescue 8 g 0     No current facility-administered medications for this visit.       Allergies  Review of patient's allergies indicates:   Allergen Reactions    Adhesive tape-silicones Hives       Past Medical History  Past Medical History:   Diagnosis Date    Asthma        Surgical History  Past Surgical History:   Procedure Laterality Date    ANTERIOR CERVICAL DISCECTOMY W/ FUSION N/A 6/28/2018    Procedure: DISCECTOMY, SPINE, CERVICAL, ANTERIOR APPROACH, ARTHROPLASTY C4-5;  Surgeon: Andrew Brennan MD;  Location: [facility] OR;  Service: Orthopedics;  Laterality: N/A;    ANTERIOR CERVICAL DISCECTOMY W/ FUSION N/A 8/1/2019    Procedure: DISCECTOMY, SPINE, CERVICAL, ANTERIOR APPROACH, WITH FUSION C4-5;  Surgeon: Andrew Brennan MD;  Location: [facility] OR;  Service: Orthopedics;  Laterality: N/A;    BONE GRAFT N/A 8/1/2019    Procedure: BONE GRAFT;  Surgeon: Andrew Brennan MD;  Location: [facility] OR;  Service: Orthopedics;   Laterality: N/A;    FUSION OF SPINE WITH INSTRUMENTATION N/A 8/1/2019    Procedure: FUSION, SPINE, WITH INSTRUMENTATION C4-5;  Surgeon: Andrew Brennan MD;  Location: [facility];  Service: Orthopedics;  Laterality: N/A;    medial branch block      SPINAL FUSION      TONSILLECTOMY         Family History:   No family history on file.    Social History:   Social History     Socioeconomic History    Marital status:    Tobacco Use    Smoking status: Never    Smokeless tobacco: Never   Substance and Sexual Activity    Alcohol use: No    Drug use: No    Sexual activity: Yes     Partners: Male     Birth control/protection: Abstinence       Hospitalization/Major Diagnostic Procedure:     Review of Systems     General/Constitutional:  Chills denies. Fatigue denies. Fever denies. Weight gain denies. Weight loss denies.    Respiratory:  Shortness of breath denies.    Cardiovascular:  Chest pain denies.    Gastrointestinal:  Constipation denies. Diarrhea denies. Nausea denies. Vomiting denies.     Hematology:  Easy bruising denies. Prolonged bleeding denies.     Genitourinary:  Frequent urination denies. Pain in lower back denies. Painful urination denies.     Musculoskeletal:  See HPI for details    Skin:  Rash denies.    Neurologic:  Dizziness denies. Gait abnormalities denies. Seizures denies. Tingling/Numbess denies.    Psychiatric:  Anxiety denies. Depressed mood denies.     Objective:   Vital Signs: There were no vitals filed for this visit.     Physical Exam      General Examination:     Constitutional: The patient is alert and oriented to lace person and time. Mood is pleasant.     Head/Face: Normal facial features normal eyebrows    Eyes: Normal extraocular motion bilaterally    Lungs: Respirations are equal and unlabored    Gait is coordinated.    Cardiovascular: There are no swelling or varicosities present.    Lymphatic: Negative for adenopathy    Skin: Normal    Neurological: Level of consciousness normal.  Oriented to place person and time and situation    Psychiatric: Oriented to time place person and situation    Tender right paraspinous muscles and right trapezius muscle mild restriction of motion Spurling's maneuver negative    XRAY Report/ Interpretation :  [facility] April 3, 2024 was personally reviewed.  Previous operative hardware and spinal fusion C4-5 adjacent levels do not show any significant disc degeneration.  No significant stenosis      Assessment:       1. Facet arthritis of cervical region    2. History of fusion of cervical spine        Plan:       Osman was seen today for pain.    Diagnoses and all orders for this visit:    Facet arthritis of cervical region  -     Ambulatory referral/consult to Pain Clinic; Future    History of fusion of cervical spine  -     Ambulatory referral/consult to Pain Clinic; Future         Follow up for 3 month f/u lumbar pain, MBB with Dr Feliz.    Does not have true radicular pain probably referred pain from facet joint mediated a muscular pain I think she had be an excellent candidate for right-sided cervical medial branch blocks and possible rhizotomy she has had multiple epidural steroid injections.  We will refer her to pain management to be assessed for possible right-sided cervical medial branch blocks and eventually rhizotomy patient agrees with treatment plan.  Treatment options were discussed with regards to the nature of the medical condition. Conservative pain intervention and surgical options were discussed in detail. The probability of success of each separate treatment option was discussed. The patient expressed a clear understanding of the treatment options. With regards to surgery, the procedure risk, benefits, complications, and outcomes were discussed. No guarantees were given with regards to surgical outcome.   The risk of complications, morbidity, and mortality of patient management decisions have been made at  the time of this visit. These are associated with the patient's problems, diagnostic procedures and treatment options. This includes the possible management options selected and those considered but not selected by the patient after shared medical decision making we discussed with the patient.   This note was created using Dragon voice recognition software that occasionally misinterpreted phrases or words.

## 2024-06-24 ENCOUNTER — PATIENT MESSAGE (OUTPATIENT)
Dept: ORTHOPEDICS | Facility: CLINIC | Age: 34
End: 2024-06-24
Payer: COMMERCIAL

## 2024-06-24 DIAGNOSIS — M47.812 FACET ARTHRITIS OF CERVICAL REGION: ICD-10-CM

## 2024-06-24 DIAGNOSIS — M54.12 CERVICAL RADICULOPATHY: ICD-10-CM

## 2024-06-24 DIAGNOSIS — Z98.1 HISTORY OF FUSION OF CERVICAL SPINE: Primary | ICD-10-CM

## (undated) DEVICE — GLOVE SURG ULTRA TOUCH 8

## (undated) DEVICE — SKINMARKER W/RULER DEVON

## (undated) DEVICE — SPONGE SUPER KERLIX 6X6.75IN

## (undated) DEVICE — SUT VICRYL 0 CT-2 27 DYE

## (undated) DEVICE — Device

## (undated) DEVICE — BLADE SURG CARBON STEEL #10

## (undated) DEVICE — FORCEP BAYO INSU SGL USE STRGT

## (undated) DEVICE — UNDERGLOVES BIOGEL PI SIZE 8

## (undated) DEVICE — SUT CTD VICRYL CT-1 27

## (undated) DEVICE — DRESSING N ADH OIL EMUL 3X3

## (undated) DEVICE — PIN DISTRACTION 12MM
Type: IMPLANTABLE DEVICE | Site: NECK | Status: NON-FUNCTIONAL
Removed: 2018-06-28

## (undated) DEVICE — SEE MEDLINE ITEM 152622

## (undated) DEVICE — GOWN B1 X-LG X-LONG

## (undated) DEVICE — SEE MEDLINE ITEM 146292

## (undated) DEVICE — DRAPE STERI INSTRUMENT 1018

## (undated) DEVICE — SEE MEDLINE ITEM 157116

## (undated) DEVICE — PIN FIXATION ANTERIOR CERVIAL
Type: IMPLANTABLE DEVICE | Site: NECK | Status: NON-FUNCTIONAL
Removed: 2019-08-01

## (undated) DEVICE — APPLICATOR CHLORAPREP ORN 26ML

## (undated) DEVICE — NDL SPINAL 18GX3.5 SPINOCAN

## (undated) DEVICE — DRAIN SIL FLT FULL FLUTED 7F

## (undated) DEVICE — STAPLER SKIN ROTATING HEAD

## (undated) DEVICE — SEE MEDLINE ITEM 157131

## (undated) DEVICE — PACK CUSTOM LUMBAR LAM SLI

## (undated) DEVICE — COVER SURG LIGHT HANDLE

## (undated) DEVICE — ELECTRODE REM PLYHSV RETURN 9

## (undated) DEVICE — LINER SUCTION 3000CC

## (undated) DEVICE — SUT BONE WAX 2.5 GRMS 12/BX

## (undated) DEVICE — SLEEVE SCD EXPRESS CALF MEDIUM

## (undated) DEVICE — UNDERGLOVES BIOGEL PI SZ 7 LF

## (undated) DEVICE — DRAPE THYROID WITH ARMBOARD

## (undated) DEVICE — COVER TABLE 77 X 96

## (undated) DEVICE — TUBE SUCTION YANKAUER HI CAP

## (undated) DEVICE — COLLAR CERVICAL UNIVERSAL 3

## (undated) DEVICE — SEE MEDLINE ITEM 157117

## (undated) DEVICE — GAUZE SPONGE BULKEE 6X6.75IN

## (undated) DEVICE — DRAPE C ARM 42 X 120 10/BX

## (undated) DEVICE — PIN DISTRACTION 12MM
Type: IMPLANTABLE DEVICE | Site: NECK | Status: NON-FUNCTIONAL
Removed: 2019-08-01

## (undated) DEVICE — PADDING CAST 4IN SPECIALIST

## (undated) DEVICE — DRAPE STERI-DRAPE 1000 17X11IN

## (undated) DEVICE — BLADE OSC & SAGITTAL 9.0MM

## (undated) DEVICE — SOL 9P NACL IRR PIC IL

## (undated) DEVICE — GAUZE SPONGE PEANUT STRL

## (undated) DEVICE — NDL BOX COUNTER

## (undated) DEVICE — BURR LGND 7.5CM 3MM FLTD

## (undated) DEVICE — TAPE MEDIPORE 4IN X 2YDS

## (undated) DEVICE — DRAPE C-ARM FOR MOBILE XRAY

## (undated) DEVICE — PACK BASIC

## (undated) DEVICE — HALTER DELUXE DISCARD HEAD

## (undated) DEVICE — UNDERGLOVES BIOGEL PI SIZE 8.5

## (undated) DEVICE — GLOVE SURG ULTRA TOUCH 7

## (undated) DEVICE — DRAPE INCISE IOBAN 2 23X17IN

## (undated) DEVICE — ALCOHOL 70% ISOP RUBBING 4OZ

## (undated) DEVICE — STRAP OR TABLE 5IN X 72IN

## (undated) DEVICE — EVACUATOR WOUND BULB 100CC

## (undated) DEVICE — DRESSING GAUZE 6PLY 4X4

## (undated) DEVICE — SUT CTD VICRYL 2.0

## (undated) DEVICE — SHEET DRAPE TOP BAR - EMERALD

## (undated) DEVICE — INSTRUMENT FRAZIER 10FR W/VENT

## (undated) DEVICE — SYS LABEL CORRECT MED

## (undated) DEVICE — ADHESIVE DERMABOND ADVANCED

## (undated) DEVICE — SEE MEDLINE ITEM 153151

## (undated) DEVICE — SEE MEDLINE ITEM 107746